# Patient Record
Sex: MALE | URBAN - METROPOLITAN AREA
[De-identification: names, ages, dates, MRNs, and addresses within clinical notes are randomized per-mention and may not be internally consistent; named-entity substitution may affect disease eponyms.]

---

## 2021-01-29 ENCOUNTER — CLINICAL SUPPORT (OUTPATIENT)
Dept: FAMILY MEDICINE CLINIC | Facility: HOSPITAL | Age: 86
End: 2021-01-29

## 2021-01-29 DIAGNOSIS — Z23 ENCOUNTER FOR IMMUNIZATION: Primary | ICD-10-CM

## 2021-01-29 PROCEDURE — 91301 SARS-COV-2 / COVID-19 MRNA VACCINE (MODERNA) 100 MCG: CPT | Performed by: ANESTHESIOLOGY

## 2021-01-29 PROCEDURE — 0011A SARS-COV-2 / COVID-19 MRNA VACCINE (MODERNA) 100 MCG: CPT | Performed by: ANESTHESIOLOGY

## 2021-03-02 ENCOUNTER — IMMUNIZATIONS (OUTPATIENT)
Dept: FAMILY MEDICINE CLINIC | Facility: HOSPITAL | Age: 86
End: 2021-03-02

## 2021-03-02 DIAGNOSIS — Z23 ENCOUNTER FOR IMMUNIZATION: Primary | ICD-10-CM

## 2021-03-02 PROCEDURE — 91301 SARS-COV-2 / COVID-19 MRNA VACCINE (MODERNA) 100 MCG: CPT

## 2021-03-02 PROCEDURE — 0012A SARS-COV-2 / COVID-19 MRNA VACCINE (MODERNA) 100 MCG: CPT

## 2023-01-01 ENCOUNTER — HOME CARE VISIT (OUTPATIENT)
Dept: HOME HEALTH SERVICES | Facility: HOME HEALTHCARE | Age: 88
End: 2023-01-01
Payer: MEDICARE

## 2023-01-01 ENCOUNTER — APPOINTMENT (EMERGENCY)
Dept: RADIOLOGY | Facility: HOSPITAL | Age: 88
End: 2023-01-01
Payer: MEDICARE

## 2023-01-01 ENCOUNTER — HOSPICE ADMISSION (OUTPATIENT)
Dept: HOME HOSPICE | Facility: HOSPICE | Age: 88
End: 2023-01-01
Payer: MEDICARE

## 2023-01-01 ENCOUNTER — HOSPITAL ENCOUNTER (EMERGENCY)
Facility: HOSPITAL | Age: 88
End: 2023-10-17
Attending: EMERGENCY MEDICINE | Admitting: EMERGENCY MEDICINE
Payer: MEDICARE

## 2023-01-01 VITALS
RESPIRATION RATE: 20 BRPM | TEMPERATURE: 97.9 F | SYSTOLIC BLOOD PRESSURE: 197 MMHG | HEART RATE: 29 BPM | DIASTOLIC BLOOD PRESSURE: 79 MMHG | OXYGEN SATURATION: 100 %

## 2023-01-01 DIAGNOSIS — Z51.5 HOSPICE CARE: ICD-10-CM

## 2023-01-01 DIAGNOSIS — R00.1 SYMPTOMATIC BRADYCARDIA: Primary | ICD-10-CM

## 2023-01-01 LAB
ALBUMIN SERPL BCP-MCNC: 3.2 G/DL (ref 3.5–5)
ALP SERPL-CCNC: 66 U/L (ref 34–104)
ALT SERPL W P-5'-P-CCNC: 15 U/L (ref 7–52)
ANION GAP SERPL CALCULATED.3IONS-SCNC: 9 MMOL/L
AST SERPL W P-5'-P-CCNC: 11 U/L (ref 13–39)
ATRIAL RATE: 58 BPM
BASOPHILS # BLD AUTO: 0.05 THOUSANDS/ÂΜL (ref 0–0.1)
BASOPHILS NFR BLD AUTO: 0 % (ref 0–1)
BILIRUB SERPL-MCNC: 0.38 MG/DL (ref 0.2–1)
BUN SERPL-MCNC: 40 MG/DL (ref 5–25)
CALCIUM ALBUM COR SERPL-MCNC: 9.7 MG/DL (ref 8.3–10.1)
CALCIUM SERPL-MCNC: 9.1 MG/DL (ref 8.4–10.2)
CARDIAC TROPONIN I PNL SERPL HS: 22 NG/L
CHLORIDE SERPL-SCNC: 98 MMOL/L (ref 96–108)
CO2 SERPL-SCNC: 24 MMOL/L (ref 21–32)
CREAT SERPL-MCNC: 0.65 MG/DL (ref 0.6–1.3)
EOSINOPHIL # BLD AUTO: 0.02 THOUSAND/ÂΜL (ref 0–0.61)
EOSINOPHIL NFR BLD AUTO: 0 % (ref 0–6)
ERYTHROCYTE [DISTWIDTH] IN BLOOD BY AUTOMATED COUNT: 16.4 % (ref 11.6–15.1)
GFR SERPL CREATININE-BSD FRML MDRD: 83 ML/MIN/1.73SQ M
GLUCOSE SERPL-MCNC: 179 MG/DL (ref 65–140)
HCT VFR BLD AUTO: 30.9 % (ref 36.5–49.3)
HGB BLD-MCNC: 9.5 G/DL (ref 12–17)
IMM GRANULOCYTES # BLD AUTO: 0.15 THOUSAND/UL (ref 0–0.2)
IMM GRANULOCYTES NFR BLD AUTO: 1 % (ref 0–2)
LYMPHOCYTES # BLD AUTO: 2.06 THOUSANDS/ÂΜL (ref 0.6–4.47)
LYMPHOCYTES NFR BLD AUTO: 12 % (ref 14–44)
MCH RBC QN AUTO: 28.4 PG (ref 26.8–34.3)
MCHC RBC AUTO-ENTMCNC: 30.7 G/DL (ref 31.4–37.4)
MCV RBC AUTO: 93 FL (ref 82–98)
MONOCYTES # BLD AUTO: 0.81 THOUSAND/ÂΜL (ref 0.17–1.22)
MONOCYTES NFR BLD AUTO: 5 % (ref 4–12)
NEUTROPHILS # BLD AUTO: 13.89 THOUSANDS/ÂΜL (ref 1.85–7.62)
NEUTS SEG NFR BLD AUTO: 82 % (ref 43–75)
NRBC BLD AUTO-RTO: 0 /100 WBCS
PLATELET # BLD AUTO: 292 THOUSANDS/UL (ref 149–390)
PMV BLD AUTO: 9.9 FL (ref 8.9–12.7)
POTASSIUM SERPL-SCNC: 3.7 MMOL/L (ref 3.5–5.3)
PROT SERPL-MCNC: 6.8 G/DL (ref 6.4–8.4)
QRS AXIS: -89 DEGREES
QRSD INTERVAL: 174 MS
QT INTERVAL: 612 MS
QTC INTERVAL: 418 MS
RBC # BLD AUTO: 3.34 MILLION/UL (ref 3.88–5.62)
SODIUM SERPL-SCNC: 131 MMOL/L (ref 135–147)
T WAVE AXIS: 47 DEGREES
VENTRICULAR RATE: 28 BPM
WBC # BLD AUTO: 16.98 THOUSAND/UL (ref 4.31–10.16)

## 2023-01-01 PROCEDURE — 84484 ASSAY OF TROPONIN QUANT: CPT | Performed by: EMERGENCY MEDICINE

## 2023-01-01 PROCEDURE — 99284 EMERGENCY DEPT VISIT MOD MDM: CPT

## 2023-01-01 PROCEDURE — 36415 COLL VENOUS BLD VENIPUNCTURE: CPT | Performed by: EMERGENCY MEDICINE

## 2023-01-01 PROCEDURE — 80053 COMPREHEN METABOLIC PANEL: CPT | Performed by: EMERGENCY MEDICINE

## 2023-01-01 PROCEDURE — 93010 ELECTROCARDIOGRAM REPORT: CPT | Performed by: INTERNAL MEDICINE

## 2023-01-01 PROCEDURE — 99285 EMERGENCY DEPT VISIT HI MDM: CPT | Performed by: EMERGENCY MEDICINE

## 2023-01-01 PROCEDURE — 93005 ELECTROCARDIOGRAM TRACING: CPT

## 2023-01-01 PROCEDURE — 85025 COMPLETE CBC W/AUTO DIFF WBC: CPT | Performed by: EMERGENCY MEDICINE

## 2023-07-05 ENCOUNTER — APPOINTMENT (EMERGENCY)
Dept: CT IMAGING | Facility: HOSPITAL | Age: 88
DRG: 698 | End: 2023-07-05
Payer: MEDICARE

## 2023-07-05 ENCOUNTER — APPOINTMENT (EMERGENCY)
Dept: RADIOLOGY | Facility: HOSPITAL | Age: 88
DRG: 698 | End: 2023-07-05
Payer: MEDICARE

## 2023-07-05 ENCOUNTER — HOSPITAL ENCOUNTER (INPATIENT)
Facility: HOSPITAL | Age: 88
LOS: 4 days | Discharge: HOME | DRG: 698 | End: 2023-07-09
Attending: EMERGENCY MEDICINE | Admitting: HOSPITALIST
Payer: MEDICARE

## 2023-07-05 DIAGNOSIS — S32.038A OTHER CLOSED FRACTURE OF THIRD LUMBAR VERTEBRA, INITIAL ENCOUNTER (HCC): ICD-10-CM

## 2023-07-05 DIAGNOSIS — N40.0 PROSTATE ENLARGEMENT: ICD-10-CM

## 2023-07-05 DIAGNOSIS — A41.9 SEPTIC SHOCK (HCC): Primary | ICD-10-CM

## 2023-07-05 DIAGNOSIS — R22.2 MASS OF CHEST WALL, LEFT: ICD-10-CM

## 2023-07-05 DIAGNOSIS — G93.40 ACUTE ENCEPHALOPATHY: ICD-10-CM

## 2023-07-05 DIAGNOSIS — R78.81 GRAM-NEGATIVE BACTEREMIA: ICD-10-CM

## 2023-07-05 DIAGNOSIS — N39.0 URINARY TRACT INFECTION: ICD-10-CM

## 2023-07-05 DIAGNOSIS — R65.21 SEPTIC SHOCK (HCC): Primary | ICD-10-CM

## 2023-07-05 DIAGNOSIS — K81.9 CHOLECYSTITIS: ICD-10-CM

## 2023-07-05 PROBLEM — R77.8 ELEVATED TROPONIN: Status: ACTIVE | Noted: 2023-07-05

## 2023-07-05 PROBLEM — R79.89 ELEVATED TROPONIN: Status: ACTIVE | Noted: 2023-07-05

## 2023-07-05 LAB
2HR DELTA HS TROPONIN: 74 NG/L
4HR DELTA HS TROPONIN: 150 NG/L
ALBUMIN SERPL BCP-MCNC: 3 G/DL (ref 3.5–5)
ALP SERPL-CCNC: 141 U/L (ref 34–104)
ALT SERPL W P-5'-P-CCNC: 379 U/L (ref 7–52)
ANION GAP SERPL CALCULATED.3IONS-SCNC: 7 MMOL/L
APTT PPP: 32 SECONDS (ref 23–37)
AST SERPL W P-5'-P-CCNC: 363 U/L (ref 13–39)
BACTERIA UR QL AUTO: ABNORMAL /HPF
BASOPHILS # BLD AUTO: 0.07 THOUSANDS/ÂΜL (ref 0–0.1)
BASOPHILS NFR BLD AUTO: 0 % (ref 0–1)
BILIRUB SERPL-MCNC: 0.61 MG/DL (ref 0.2–1)
BILIRUB UR QL STRIP: NEGATIVE
BUN SERPL-MCNC: 20 MG/DL (ref 5–25)
CALCIUM ALBUM COR SERPL-MCNC: 9.6 MG/DL (ref 8.3–10.1)
CALCIUM SERPL-MCNC: 8.8 MG/DL (ref 8.4–10.2)
CAOX CRY URNS QL MICRO: ABNORMAL /HPF
CARDIAC TROPONIN I PNL SERPL HS: 102 NG/L
CARDIAC TROPONIN I PNL SERPL HS: 178 NG/L
CARDIAC TROPONIN I PNL SERPL HS: 28 NG/L
CHLORIDE SERPL-SCNC: 99 MMOL/L (ref 96–108)
CLARITY UR: ABNORMAL
CO2 SERPL-SCNC: 23 MMOL/L (ref 21–32)
COLOR UR: YELLOW
CREAT SERPL-MCNC: 1.22 MG/DL (ref 0.6–1.3)
EOSINOPHIL # BLD AUTO: 0.01 THOUSAND/ÂΜL (ref 0–0.61)
EOSINOPHIL NFR BLD AUTO: 0 % (ref 0–6)
ERYTHROCYTE [DISTWIDTH] IN BLOOD BY AUTOMATED COUNT: 16.2 % (ref 11.6–15.1)
GFR SERPL CREATININE-BSD FRML MDRD: 50 ML/MIN/1.73SQ M
GLUCOSE SERPL-MCNC: 201 MG/DL (ref 65–140)
GLUCOSE SERPL-MCNC: 226 MG/DL (ref 65–140)
GLUCOSE SERPL-MCNC: 244 MG/DL (ref 65–140)
GLUCOSE SERPL-MCNC: 262 MG/DL (ref 65–140)
GLUCOSE UR STRIP-MCNC: NEGATIVE MG/DL
HCT VFR BLD AUTO: 30.2 % (ref 36.5–49.3)
HGB BLD-MCNC: 9.3 G/DL (ref 12–17)
HGB UR QL STRIP.AUTO: ABNORMAL
IMM GRANULOCYTES # BLD AUTO: 0.14 THOUSAND/UL (ref 0–0.2)
IMM GRANULOCYTES NFR BLD AUTO: 1 % (ref 0–2)
INR PPP: 1.15 (ref 0.84–1.19)
KETONES UR STRIP-MCNC: NEGATIVE MG/DL
LACTATE SERPL-SCNC: 1.5 MMOL/L (ref 0.5–2)
LACTATE SERPL-SCNC: 1.8 MMOL/L (ref 0.5–2)
LEUKOCYTE ESTERASE UR QL STRIP: ABNORMAL
LIPASE SERPL-CCNC: 178 U/L (ref 11–82)
LYMPHOCYTES # BLD AUTO: 1.69 THOUSANDS/ÂΜL (ref 0.6–4.47)
LYMPHOCYTES NFR BLD AUTO: 8 % (ref 14–44)
MCH RBC QN AUTO: 29.1 PG (ref 26.8–34.3)
MCHC RBC AUTO-ENTMCNC: 30.8 G/DL (ref 31.4–37.4)
MCV RBC AUTO: 94 FL (ref 82–98)
MONOCYTES # BLD AUTO: 0.68 THOUSAND/ÂΜL (ref 0.17–1.22)
MONOCYTES NFR BLD AUTO: 3 % (ref 4–12)
MUCOUS THREADS UR QL AUTO: ABNORMAL
NEUTROPHILS # BLD AUTO: 17.95 THOUSANDS/ÂΜL (ref 1.85–7.62)
NEUTS SEG NFR BLD AUTO: 88 % (ref 43–75)
NITRITE UR QL STRIP: NEGATIVE
NON-SQ EPI CELLS URNS QL MICRO: ABNORMAL /HPF
NRBC BLD AUTO-RTO: 0 /100 WBCS
PH UR STRIP.AUTO: 7.5 [PH]
PLATELET # BLD AUTO: 173 THOUSANDS/UL (ref 149–390)
PLATELET # BLD AUTO: 236 THOUSANDS/UL (ref 149–390)
PMV BLD AUTO: 9.3 FL (ref 8.9–12.7)
PMV BLD AUTO: 9.4 FL (ref 8.9–12.7)
POTASSIUM SERPL-SCNC: 4.9 MMOL/L (ref 3.5–5.3)
PROCALCITONIN SERPL-MCNC: 3.21 NG/ML
PROCALCITONIN SERPL-MCNC: 4.96 NG/ML
PROT SERPL-MCNC: 6.2 G/DL (ref 6.4–8.4)
PROT UR STRIP-MCNC: ABNORMAL MG/DL
PROTHROMBIN TIME: 15.5 SECONDS (ref 11.6–14.5)
RBC # BLD AUTO: 3.2 MILLION/UL (ref 3.88–5.62)
RBC #/AREA URNS AUTO: ABNORMAL /HPF
SODIUM SERPL-SCNC: 129 MMOL/L (ref 135–147)
SP GR UR STRIP.AUTO: 1.02 (ref 1–1.03)
UROBILINOGEN UR STRIP-ACNC: <2 MG/DL
WBC # BLD AUTO: 20.54 THOUSAND/UL (ref 4.31–10.16)
WBC #/AREA URNS AUTO: ABNORMAL /HPF

## 2023-07-05 PROCEDURE — 70450 CT HEAD/BRAIN W/O DYE: CPT

## 2023-07-05 PROCEDURE — 83605 ASSAY OF LACTIC ACID: CPT | Performed by: PHYSICIAN ASSISTANT

## 2023-07-05 PROCEDURE — 96368 THER/DIAG CONCURRENT INF: CPT

## 2023-07-05 PROCEDURE — 85610 PROTHROMBIN TIME: CPT | Performed by: PHYSICIAN ASSISTANT

## 2023-07-05 PROCEDURE — 87186 SC STD MICRODIL/AGAR DIL: CPT | Performed by: PHYSICIAN ASSISTANT

## 2023-07-05 PROCEDURE — 82948 REAGENT STRIP/BLOOD GLUCOSE: CPT

## 2023-07-05 PROCEDURE — 80053 COMPREHEN METABOLIC PANEL: CPT | Performed by: PHYSICIAN ASSISTANT

## 2023-07-05 PROCEDURE — 96366 THER/PROPH/DIAG IV INF ADDON: CPT

## 2023-07-05 PROCEDURE — 93005 ELECTROCARDIOGRAM TRACING: CPT

## 2023-07-05 PROCEDURE — 85730 THROMBOPLASTIN TIME PARTIAL: CPT | Performed by: PHYSICIAN ASSISTANT

## 2023-07-05 PROCEDURE — 83605 ASSAY OF LACTIC ACID: CPT | Performed by: HOSPITALIST

## 2023-07-05 PROCEDURE — 99285 EMERGENCY DEPT VISIT HI MDM: CPT

## 2023-07-05 PROCEDURE — 84145 PROCALCITONIN (PCT): CPT | Performed by: HOSPITALIST

## 2023-07-05 PROCEDURE — 81001 URINALYSIS AUTO W/SCOPE: CPT | Performed by: PHYSICIAN ASSISTANT

## 2023-07-05 PROCEDURE — 87154 CUL TYP ID BLD PTHGN 6+ TRGT: CPT | Performed by: PHYSICIAN ASSISTANT

## 2023-07-05 PROCEDURE — 96361 HYDRATE IV INFUSION ADD-ON: CPT

## 2023-07-05 PROCEDURE — 71260 CT THORAX DX C+: CPT

## 2023-07-05 PROCEDURE — 96365 THER/PROPH/DIAG IV INF INIT: CPT

## 2023-07-05 PROCEDURE — 87077 CULTURE AEROBIC IDENTIFY: CPT | Performed by: PHYSICIAN ASSISTANT

## 2023-07-05 PROCEDURE — 84145 PROCALCITONIN (PCT): CPT | Performed by: PHYSICIAN ASSISTANT

## 2023-07-05 PROCEDURE — 36415 COLL VENOUS BLD VENIPUNCTURE: CPT | Performed by: PHYSICIAN ASSISTANT

## 2023-07-05 PROCEDURE — 71045 X-RAY EXAM CHEST 1 VIEW: CPT

## 2023-07-05 PROCEDURE — 87147 CULTURE TYPE IMMUNOLOGIC: CPT | Performed by: PHYSICIAN ASSISTANT

## 2023-07-05 PROCEDURE — G1004 CDSM NDSC: HCPCS

## 2023-07-05 PROCEDURE — 87086 URINE CULTURE/COLONY COUNT: CPT | Performed by: PHYSICIAN ASSISTANT

## 2023-07-05 PROCEDURE — 84484 ASSAY OF TROPONIN QUANT: CPT | Performed by: PHYSICIAN ASSISTANT

## 2023-07-05 PROCEDURE — 74177 CT ABD & PELVIS W/CONTRAST: CPT

## 2023-07-05 PROCEDURE — 85025 COMPLETE CBC W/AUTO DIFF WBC: CPT | Performed by: PHYSICIAN ASSISTANT

## 2023-07-05 PROCEDURE — 85049 AUTOMATED PLATELET COUNT: CPT | Performed by: HOSPITALIST

## 2023-07-05 PROCEDURE — 99285 EMERGENCY DEPT VISIT HI MDM: CPT | Performed by: EMERGENCY MEDICINE

## 2023-07-05 PROCEDURE — 83690 ASSAY OF LIPASE: CPT | Performed by: PHYSICIAN ASSISTANT

## 2023-07-05 PROCEDURE — 87040 BLOOD CULTURE FOR BACTERIA: CPT | Performed by: PHYSICIAN ASSISTANT

## 2023-07-05 PROCEDURE — 99223 1ST HOSP IP/OBS HIGH 75: CPT | Performed by: HOSPITALIST

## 2023-07-05 RX ORDER — ASPIRIN 81 MG/1
81 TABLET, CHEWABLE ORAL DAILY
COMMUNITY

## 2023-07-05 RX ORDER — ONDANSETRON 2 MG/ML
4 INJECTION INTRAMUSCULAR; INTRAVENOUS EVERY 6 HOURS PRN
Status: DISCONTINUED | OUTPATIENT
Start: 2023-07-05 | End: 2023-07-09 | Stop reason: HOSPADM

## 2023-07-05 RX ORDER — ATORVASTATIN CALCIUM 20 MG/1
20 TABLET, FILM COATED ORAL
Status: DISCONTINUED | OUTPATIENT
Start: 2023-07-05 | End: 2023-07-09 | Stop reason: HOSPADM

## 2023-07-05 RX ORDER — METOPROLOL SUCCINATE 50 MG/1
50 TABLET, EXTENDED RELEASE ORAL DAILY
Status: DISCONTINUED | OUTPATIENT
Start: 2023-07-05 | End: 2023-07-09 | Stop reason: HOSPADM

## 2023-07-05 RX ORDER — METOPROLOL SUCCINATE 50 MG/1
50 TABLET, EXTENDED RELEASE ORAL DAILY
COMMUNITY

## 2023-07-05 RX ORDER — CEFEPIME HYDROCHLORIDE 2 G/50ML
2000 INJECTION, SOLUTION INTRAVENOUS EVERY 12 HOURS
Status: DISCONTINUED | OUTPATIENT
Start: 2023-07-05 | End: 2023-07-07

## 2023-07-05 RX ORDER — PHENAZOPYRIDINE HYDROCHLORIDE 95 MG/1
95 TABLET ORAL 3 TIMES DAILY PRN
COMMUNITY

## 2023-07-05 RX ORDER — CEFEPIME HYDROCHLORIDE 2 G/50ML
2000 INJECTION, SOLUTION INTRAVENOUS ONCE
Status: DISCONTINUED | OUTPATIENT
Start: 2023-07-05 | End: 2023-07-05

## 2023-07-05 RX ORDER — FINASTERIDE 5 MG/1
5 TABLET, FILM COATED ORAL DAILY
COMMUNITY

## 2023-07-05 RX ORDER — PHENAZOPYRIDINE HYDROCHLORIDE 100 MG/1
95 TABLET, FILM COATED ORAL 3 TIMES DAILY PRN
Status: DISCONTINUED | OUTPATIENT
Start: 2023-07-05 | End: 2023-07-09 | Stop reason: HOSPADM

## 2023-07-05 RX ORDER — INSULIN LISPRO 100 [IU]/ML
1-5 INJECTION, SOLUTION INTRAVENOUS; SUBCUTANEOUS
Status: DISCONTINUED | OUTPATIENT
Start: 2023-07-05 | End: 2023-07-09 | Stop reason: HOSPADM

## 2023-07-05 RX ORDER — LOSARTAN POTASSIUM 50 MG/1
25 TABLET ORAL DAILY
COMMUNITY

## 2023-07-05 RX ORDER — ATORVASTATIN CALCIUM 20 MG/1
20 TABLET, FILM COATED ORAL DAILY
COMMUNITY

## 2023-07-05 RX ORDER — SODIUM CHLORIDE 9 MG/ML
75 INJECTION, SOLUTION INTRAVENOUS CONTINUOUS
Status: DISCONTINUED | OUTPATIENT
Start: 2023-07-05 | End: 2023-07-09 | Stop reason: HOSPADM

## 2023-07-05 RX ORDER — METRONIDAZOLE 500 MG/100ML
500 INJECTION, SOLUTION INTRAVENOUS EVERY 8 HOURS
Status: DISCONTINUED | OUTPATIENT
Start: 2023-07-05 | End: 2023-07-07

## 2023-07-05 RX ORDER — ENOXAPARIN SODIUM 100 MG/ML
40 INJECTION SUBCUTANEOUS DAILY
Status: DISCONTINUED | OUTPATIENT
Start: 2023-07-05 | End: 2023-07-09 | Stop reason: HOSPADM

## 2023-07-05 RX ORDER — ACETAMINOPHEN 325 MG/1
650 TABLET ORAL EVERY 6 HOURS PRN
Status: DISCONTINUED | OUTPATIENT
Start: 2023-07-05 | End: 2023-07-09 | Stop reason: HOSPADM

## 2023-07-05 RX ORDER — CEFTRIAXONE 2 G/50ML
2000 INJECTION, SOLUTION INTRAVENOUS ONCE
Status: COMPLETED | OUTPATIENT
Start: 2023-07-05 | End: 2023-07-05

## 2023-07-05 RX ADMIN — METRONIDAZOLE 500 MG: 500 INJECTION, SOLUTION INTRAVENOUS at 19:42

## 2023-07-05 RX ADMIN — SODIUM CHLORIDE 490 ML: 0.9 INJECTION, SOLUTION INTRAVENOUS at 11:32

## 2023-07-05 RX ADMIN — SODIUM CHLORIDE 500 ML: 0.9 INJECTION, SOLUTION INTRAVENOUS at 10:52

## 2023-07-05 RX ADMIN — IOHEXOL 80 ML: 350 INJECTION, SOLUTION INTRAVENOUS at 11:06

## 2023-07-05 RX ADMIN — SODIUM CHLORIDE 125 ML/HR: 0.9 INJECTION, SOLUTION INTRAVENOUS at 15:15

## 2023-07-05 RX ADMIN — METRONIDAZOLE 500 MG: 500 INJECTION, SOLUTION INTRAVENOUS at 10:53

## 2023-07-05 RX ADMIN — ATORVASTATIN CALCIUM 20 MG: 20 TABLET, FILM COATED ORAL at 16:55

## 2023-07-05 RX ADMIN — INSULIN LISPRO 2 UNITS: 100 INJECTION, SOLUTION INTRAVENOUS; SUBCUTANEOUS at 21:35

## 2023-07-05 RX ADMIN — ENOXAPARIN SODIUM 40 MG: 100 INJECTION SUBCUTANEOUS at 16:55

## 2023-07-05 RX ADMIN — CEFTRIAXONE 2000 MG: 2 INJECTION, SOLUTION INTRAVENOUS at 11:33

## 2023-07-05 RX ADMIN — CEFEPIME HYDROCHLORIDE 2000 MG: 2 INJECTION, SOLUTION INTRAVENOUS at 18:08

## 2023-07-05 RX ADMIN — SODIUM CHLORIDE 1000 ML: 0.9 INJECTION, SOLUTION INTRAVENOUS at 08:43

## 2023-07-05 RX ADMIN — METOPROLOL SUCCINATE 50 MG: 50 TABLET, EXTENDED RELEASE ORAL at 16:55

## 2023-07-05 NOTE — ASSESSMENT & PLAN NOTE
He has no chest pain. This is not an acute coronary syndrome.     I would not further pursue this  Continue metoprolol

## 2023-07-05 NOTE — PLAN OF CARE
Problem: MOBILITY - ADULT  Goal: Maintain or return to baseline ADL function  Description: INTERVENTIONS:  -  Assess patient's ability to carry out ADLs; assess patient's baseline for ADL function and identify physical deficits which impact ability to perform ADLs (bathing, care of mouth/teeth, toileting, grooming, dressing, etc.)  - Assess/evaluate cause of self-care deficits   - Assess range of motion  - Assess patient's mobility; develop plan if impaired  - Assess patient's need for assistive devices and provide as appropriate  - Encourage maximum independence but intervene and supervise when necessary  - Involve family in performance of ADLs  - Assess for home care needs following discharge   - Consider OT consult to assist with ADL evaluation and planning for discharge  - Provide patient education as appropriate  7/5/2023 1739 by Armando Chan RN  Outcome: Progressing  7/5/2023 1739 by Armando Chan RN  Outcome: Progressing  Goal: Maintains/Returns to pre admission functional level  Description: INTERVENTIONS:  - Perform BMAT or MOVE assessment daily.   - Set and communicate daily mobility goal to care team and patient/family/caregiver. - Collaborate with rehabilitation services on mobility goals if consulted  - Perform Range of Motion 4 times a day. - Reposition patient every 2 hours.   - Dangle patient 3 times a day  - Stand patient 3 times a day  - Ambulate patient 3 times a day  - Out of bed to chair 3 times a day   - Out of bed for meals 3 times a day  - Out of bed for toileting  - Record patient progress and toleration of activity level   7/5/2023 1739 by Armando Chan RN  Outcome: Progressing  7/5/2023 1739 by Armando Chan RN  Outcome: Progressing     Problem: Prexisting or High Potential for Compromised Skin Integrity  Goal: Skin integrity is maintained or improved  Description: INTERVENTIONS:  - Identify patients at risk for skin breakdown  - Assess and monitor skin integrity  - Assess and monitor nutrition and hydration status  - Monitor labs   - Assess for incontinence   - Turn and reposition patient  - Assist with mobility/ambulation  - Relieve pressure over bony prominences  - Avoid friction and shearing  - Provide appropriate hygiene as needed including keeping skin clean and dry  - Evaluate need for skin moisturizer/barrier cream  - Collaborate with interdisciplinary team   - Patient/family teaching  - Consider wound care consult   7/5/2023 1739 by Lucrecia Wells RN  Outcome: Progressing  7/5/2023 1739 by Lucrecia Wells RN  Outcome: Progressing     Problem: PAIN - ADULT  Goal: Verbalizes/displays adequate comfort level or baseline comfort level  Description: Interventions:  - Encourage patient to monitor pain and request assistance  - Assess pain using appropriate pain scale  - Administer analgesics based on type and severity of pain and evaluate response  - Implement non-pharmacological measures as appropriate and evaluate response  - Consider cultural and social influences on pain and pain management  - Notify physician/advanced practitioner if interventions unsuccessful or patient reports new pain  7/5/2023 1739 by Lucrecia Wells RN  Outcome: Progressing  7/5/2023 1739 by Lucrecia Wells RN  Outcome: Progressing     Problem: INFECTION - ADULT  Goal: Absence or prevention of progression during hospitalization  Description: INTERVENTIONS:  - Assess and monitor for signs and symptoms of infection  - Monitor lab/diagnostic results  - Monitor all insertion sites, i.e. indwelling lines, tubes, and drains  - Monitor endotracheal if appropriate and nasal secretions for changes in amount and color  - [place] appropriate cooling/warming therapies per order  - Administer medications as ordered  - Instruct and encourage patient and family to use good hand hygiene technique  - Identify and instruct in appropriate isolation precautions for identified infection/condition  7/5/2023 1739 by Link Greco RN  Outcome: Progressing  7/5/2023 1739 by Link Greco RN  Outcome: Progressing     Problem: SAFETY ADULT  Goal: Patient will remain free of falls  Description: INTERVENTIONS:  - Educate patient/family on patient safety including physical limitations  - Instruct patient to call for assistance with activity   - Consult OT/PT to assist with strengthening/mobility   - Keep Call bell within reach  - Keep bed low and locked with side rails adjusted as appropriate  - Keep care items and personal belongings within reach  - Initiate and maintain comfort rounds  - Make Fall Risk Sign visible to staff  - Offer Toileting every 2 Hours, in advance of need  - Initiate/Maintain bed alarm  - Obtain necessary fall risk management equipment  - Apply yellow socks and bracelet for high fall risk patients  - Consider moving patient to room near nurses station  7/5/2023 1739 by Link Greco RN  Outcome: Progressing  7/5/2023 1739 by Link Greco RN  Outcome: Progressing     Problem: CARDIOVASCULAR - ADULT  Goal: Maintains optimal cardiac output and hemodynamic stability  Description: INTERVENTIONS:  - Monitor I/O, vital signs and rhythm  - Monitor for S/S and trends of decreased cardiac output  - Administer and titrate ordered vasoactive medications to optimize hemodynamic stability  - Assess quality of pulses, skin color and temperature  - Assess for signs of decreased coronary artery perfusion  - Instruct patient to report change in severity of symptoms  7/5/2023 1739 by Link Greco RN  Outcome: Progressing  7/5/2023 1739 by Link Greco RN  Outcome: Progressing     Problem: RESPIRATORY - ADULT  Goal: Achieves optimal ventilation and oxygenation  Description: INTERVENTIONS:  - Assess for changes in respiratory status  - Assess for changes in mentation and behavior  - Position to facilitate oxygenation and minimize respiratory effort  - Oxygen administered by appropriate delivery if ordered  - Initiate smoking cessation education as indicated  - Encourage broncho-pulmonary hygiene including cough, deep breathe, Incentive Spirometry  - Assess the need for suctioning and aspirate as needed  - Assess and instruct to report SOB or any respiratory difficulty  - Respiratory Therapy support as indicated  7/5/2023 1739 by Beatrice Marquez RN  Outcome: Progressing  7/5/2023 1739 by Beatrice Marquez RN  Outcome: Progressing     Problem: GENITOURINARY - ADULT  Goal: Urinary catheter remains patent  Description: INTERVENTIONS:  - Assess patency of urinary catheter  - If patient has a chronic conde, consider changing catheter if non-functioning  - Follow guidelines for intermittent irrigation of non-functioning urinary catheter  7/5/2023 1739 by Beatrice Marquez RN  Outcome: Progressing  7/5/2023 1739 by Beatrice Marquez RN  Outcome: Progressing

## 2023-07-05 NOTE — ASSESSMENT & PLAN NOTE
Did have a cough last night which is not unusual for him. Nonproductive  He just finished a course of Bactrim less than a week ago for a UTI with a chronic indwelling [place] catheter. Now this morning he was noted by his daughter to be very lethargic, falls asleep while we are talking. He does not have any specific complaints though. Not short of breath, not coughing, no abdominal pain, does have a little bit of loose stool after the Bactrim but he would not say the diarrhea. 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   Otherwise I do not really see a source for infection other than these 2    We will send blood cultures, urine cultures  Follow sepsis pathway  Start him empirically on cefepime    Ask infectious disease to see the patient  Asked wound care to see the patient with this chest wall mass (he has refused work-up and treatment of this mass for years)

## 2023-07-05 NOTE — ED PROVIDER NOTES
History  Chief Complaint   Patient presents with   • Altered Mental Status     To ED with AMS per family. Patient unable to stand, poor response to family, seating this am per family. Being treated for UTI. Family states that he was alert and oriented last evening. 72-year-old male from home with altered mental status this morning. No medical records are available to us but patient's wife states he has a remote history of coronary bypass, enlarged prostate, indwelling Acosta catheter with recent Bactrim treatment for UTI, left chest wall mass that has been enlarging, hyperglycemia and hypertension. He was found diaphoretic, weak, confused and lethargic this morning. Brought by car. Patient denies to me pain, dyspnea, vomiting, diarrhea and blood in toilet. Wife states that he has been well lately and ate 3 meals yesterday. He watched television till 9:30 PM and walked to bed last night. She noted blood sugar was 232 this morning versus normally under 150 without diabetes medications. He did not receive his morning medications or any food or drink. Prior to Admission Medications   Prescriptions Last Dose Informant Patient Reported?  Taking?   aspirin 81 mg chewable tablet 7/4/2023  Yes Yes   Sig: Chew 81 mg daily   atorvastatin (LIPITOR) 20 mg tablet 7/4/2023  Yes Yes   Sig: Take 20 mg by mouth daily   finasteride (PROSCAR) 5 mg tablet 7/4/2023  Yes Yes   Sig: Take 5 mg by mouth daily   losartan (COZAAR) 50 mg tablet 7/4/2023  Yes Yes   Sig: Take 50 mg by mouth daily   metoprolol succinate (TOPROL-XL) 50 mg 24 hr tablet 7/4/2023  Yes Yes   Sig: Take 50 mg by mouth daily   phenazopyridine (PYRIDIUM) 95 MG tablet  Spouse/Significant Other Yes Yes   Sig: Take 95 mg by mouth 3 (three) times a day as needed for bladder spasms      Facility-Administered Medications: None       Past Medical History:   Diagnosis Date   • Coronary artery disease    • Diabetes mellitus ([address])    • Renal disorder        Past Surgical History:   Procedure Laterality Date   • CORONARY ANGIOPLASTY WITH STENT PLACEMENT         History reviewed. No pertinent family history. I have reviewed and agree with the history as documented. E-Cigarette/Vaping   • E-Cigarette Use Never User      E-Cigarette/Vaping Substances   • Nicotine No    • Flavoring No      Social History     Tobacco Use   • Smoking status: Former     Types: Cigarettes   • Smokeless tobacco: Never   Vaping Use   • Vaping Use: Never used   Substance Use Topics   • Alcohol use: Never   • Drug use: Never       Review of Systems   Constitutional: Positive for activity change, diaphoresis and fatigue. Negative for fever. Respiratory: Positive for cough. Negative for shortness of breath. Cardiovascular: Negative for chest pain, palpitations and leg swelling. Gastrointestinal: Negative for abdominal pain, blood in stool, diarrhea and vomiting. Genitourinary: Positive for difficulty urinating ( Indwelling Acosta catheter). Skin: Positive for wound ( Left chest wall, chronic). Negative for rash. Physical Exam  Physical Exam  Vitals and nursing note reviewed. Constitutional:       General: He is not in acute distress. Appearance: He is ill-appearing and diaphoretic. HENT:      Head: Normocephalic and atraumatic. Eyes:      General: No scleral icterus. Pupils: Pupils are equal.   Cardiovascular:      Rate and Rhythm: Normal rate and regular rhythm. Heart sounds: Normal heart sounds. Pulmonary:      Effort: Pulmonary effort is normal.      Breath sounds: Rales ( Left base) present. Comments: Large erosive left anterior chest wall lesion. No active bleeding or signs of infection. Abdominal:      General: Bowel sounds are normal. There is no distension. Palpations: Abdomen is soft. Tenderness: There is no abdominal tenderness. Musculoskeletal:         General: No swelling or tenderness. Normal range of motion.       Cervical back: Neck supple. No rigidity. Skin:     Capillary Refill: Capillary refill takes 2 to 3 seconds. Coloration: Skin is pale. Neurological:      Mental Status: He is alert. He is disoriented. GCS: GCS eye subscore is 4. GCS verbal subscore is 4. GCS motor subscore is 6. Cranial Nerves: No cranial nerve deficit or facial asymmetry. Sensory: No sensory deficit. Motor: No weakness.          Vital Signs  ED Triage Vitals   Temperature Pulse Respirations Blood Pressure SpO2   07/05/23 0911 07/05/23 0828 07/05/23 0828 07/05/23 0828 07/05/23 0828   97.8 °F (36.6 °C) 93 20 (!) 77/47 90 %      Temp Source Heart Rate Source Patient Position - Orthostatic VS BP Location FiO2 (%)   07/05/23 0911 07/05/23 0828 07/05/23 0828 07/05/23 0828 --   Oral Monitor Lying Left arm       Pain Score       07/05/23 0828       No Pain           Vitals:    07/05/23 1600 07/05/23 1655 07/05/23 1700 07/05/23 1800   BP: 124/56 125/56 150/63 132/60   Pulse: 67 74 74 75   Patient Position - Orthostatic VS:             Visual Acuity  Visual Acuity    Flowsheet Row Most Recent Value   L Pupil Size (mm) 3   R Pupil Size (mm) 3   L Pupil Shape Round   R Pupil Shape Round          ED Medications  Medications   metroNIDAZOLE (FLAGYL) IVPB (premix) 500 mg 100 mL (500 mg Intravenous New Bag 7/5/23 1942)   atorvastatin (LIPITOR) tablet 20 mg (20 mg Oral Given 7/5/23 1655)   metoprolol succinate (TOPROL-XL) 24 hr tablet 50 mg (50 mg Oral Given 7/5/23 1655)   phenazopyridine (PYRIDIUM) tablet 100 mg (has no administration in time range)   sodium chloride 0.9 % infusion (75 mL/hr Intravenous Rate/Dose Change 7/1934)   enoxaparin (LOVENOX) subcutaneous injection 40 mg (40 mg Subcutaneous Given 7/5/23 1655)   ondansetron (ZOFRAN) injection 4 mg (has no administration in time range)   acetaminophen (TYLENOL) tablet 650 mg (has no administration in time range)   cefepime (MAXIPIME) IVPB (premix in dextrose) 2,000 mg 50 mL (2,000 mg Intravenous New Bag 7/5/23 1808)   insulin lispro (HumaLOG) 100 units/mL subcutaneous injection 1-5 Units ( Subcutaneous Not Given 7/5/23 1807)   insulin lispro (HumaLOG) 100 units/mL subcutaneous injection 1-5 Units (has no administration in time range)   sodium chloride 0.9 % bolus 1,000 mL (0 mL Intravenous Stopped 7/5/23 0930)   sodium chloride 0.9 % bolus 500 mL (0 mL Intravenous Stopped 7/5/23 1145)   cefTRIAXone (ROCEPHIN) IVPB (premix in dextrose) 2,000 mg 50 mL (0 mg Intravenous Stopped 7/5/23 1200)   iohexol (OMNIPAQUE) 350 MG/ML injection (MULTI-DOSE) 100 mL (80 mL Intravenous Given 7/5/23 1106)   sodium chloride 0.9 % bolus 490 mL (0 mL Intravenous Stopped 7/5/23 1215)       Diagnostic Studies  Results Reviewed     Procedure Component Value Units Date/Time    Clostridium difficile toxin by PCR with EIA [[id number]] Updated: 07/05/23 1556    Lab Status: No result Specimen: Stool from Per Rectum     Blood culture #1 [[id number]] Collected: 07/05/23 0837    Lab Status: Preliminary result Specimen: Blood from Arm, Right Updated: 07/05/23 1501     Blood Culture Received in Microbiology Lab. Culture in Progress. Blood culture #2 [[id number]] Collected: 07/05/23 0837    Lab Status: Preliminary result Specimen: Blood from Arm, Right Updated: 07/05/23 1501     Blood Culture Received in Microbiology Lab. Culture in Progress.     HS Troponin I 4hr [[id number]]  (Abnormal) Collected: 07/05/23 1253    Lab Status: Final result Specimen: Blood from Arm, Right Updated: 07/05/23 1332     hs TnI 4hr 178 ng/L      Delta 4hr hsTnI 150 ng/L     Urine Microscopic [[id number]]  (Abnormal) Collected: 07/05/23 1049    Lab Status: Final result Specimen: Urine, Indwelling Acosta Catheter Updated: 07/05/23 1147     RBC, UA None Seen /hpf      WBC, UA Innumerable /hpf      Epithelial Cells None Seen /hpf      Bacteria, UA Innumerable /hpf      MUCUS THREADS None Seen     Ca Oxalate Bhumi, UA Occasional /hpf     Urine culture [[id number]] Collected: 07/05/23 1049    Lab Status:  In process Specimen: Urine, Indwelling Acosta Catheter Updated: 07/05/23 1145    HS Troponin I 2hr [[id number]]  (Abnormal) Collected: 07/05/23 1048    Lab Status: Final result Specimen: Blood from Arm, Right Updated: 07/05/23 1126     hs TnI 2hr 102 ng/L      Delta 2hr hsTnI 74 ng/L     UA w Reflex to Microscopic w Reflex to Culture [[id number]]  (Abnormal) Collected: 07/05/23 1049    Lab Status: Final result Specimen: Urine, Indwelling Acosta Catheter Updated: 07/05/23 1118     Color, UA Yellow     Clarity, UA Cloudy     Specific Gravity, UA 1.020     pH, UA 7.5     Leukocytes, UA Large     Nitrite, UA Negative     Protein, UA 30 (1+) mg/dl      Glucose, UA Negative mg/dl      Ketones, UA Negative mg/dl      Urobilinogen, UA <2.0 mg/dl      Bilirubin, UA Negative     Occult Blood, UA Moderate    Protime-INR [[id number]]  (Abnormal) Collected: 07/05/23 0837    Lab Status: Final result Specimen: Blood from Arm, Right Updated: 07/05/23 0937     Protime 15.5 seconds      INR 1.15    APTT [[id number]]  (Normal) Collected: 07/05/23 0837    Lab Status: Final result Specimen: Blood from Arm, Right Updated: 07/05/23 0937     PTT 32 seconds     Procalcitonin [[id number]]  (Abnormal) Collected: 07/05/23 0837    Lab Status: Final result Specimen: Blood from Arm, Right Updated: 07/05/23 0923     Procalcitonin 4.96 ng/ml     HS Troponin 0hr (reflex protocol) [[id number]]  (Normal) Collected: 07/05/23 0837    Lab Status: Final result Specimen: Blood from Arm, Right Updated: 07/05/23 0922     hs TnI 0hr 28 ng/L     Comprehensive metabolic panel [[id number]]  (Abnormal) Collected: 07/05/23 0837    Lab Status: Final result Specimen: Blood from Arm, Right Updated: 07/05/23 0916     Sodium 129 mmol/L      Potassium 4.9 mmol/L      Chloride 99 mmol/L      CO2 23 mmol/L      ANION GAP 7 mmol/L      BUN 20 mg/dL      Creatinine 1.22 mg/dL      Glucose 201 mg/dL      Calcium 8.8 mg/dL      Corrected Calcium 9.6 mg/dL  U/L       U/L      Alkaline Phosphatase 141 U/L      Total Protein 6.2 g/dL      Albumin 3.0 g/dL      Total Bilirubin 0.61 mg/dL      eGFR 50 ml/min/1.73sq m     Narrative:      [facility] guidelines for Chronic Kidney Disease (CKD):   •  Stage 1 with normal or high GFR (GFR > 90 mL/min/1.73 square meters)  •  Stage 2 Mild CKD (GFR = 60-89 mL/min/1.73 square meters)  •  Stage 3A Moderate CKD (GFR = 45-59 mL/min/1.73 square meters)  •  Stage 3B Moderate CKD (GFR = 30-44 mL/min/1.73 square meters)  •  Stage 4 Severe CKD (GFR = 15-29 mL/min/1.73 square meters)  •  Stage 5 End Stage CKD (GFR <15 mL/min/1.73 square meters)  Note: GFR calculation is accurate only with a steady state creatinine    Lipase [[id number]]  (Abnormal) Collected: 07/05/23 0837    Lab Status: Final result Specimen: Blood from Arm, Right Updated: 07/05/23 0916     Lipase 178 u/L     Lactic acid [[id number]]  (Normal) Collected: 07/05/23 0837    Lab Status: Final result Specimen: Blood from Arm, Right Updated: 07/05/23 0915     LACTIC ACID 1.8 mmol/L     Narrative:      Result may be elevated if tourniquet was used during collection.     CBC and differential [[id number]]  (Abnormal) Collected: 07/05/23 0837    Lab Status: Final result Specimen: Blood from Arm, Right Updated: 07/05/23 0856     WBC 20.54 Thousand/uL      RBC 3.20 Million/uL      Hemoglobin 9.3 g/dL      Hematocrit 30.2 %      MCV 94 fL      MCH 29.1 pg      MCHC 30.8 g/dL      RDW 16.2 %      MPV 9.4 fL      Platelets 881 Thousands/uL      nRBC 0 /100 WBCs      Neutrophils Relative 88 %      Immat GRANS % 1 %      Lymphocytes Relative 8 %      Monocytes Relative 3 %      Eosinophils Relative 0 %      Basophils Relative 0 %      Neutrophils Absolute 17.95 Thousands/µL      Immature Grans Absolute 0.14 Thousand/uL      Lymphocytes Absolute 1.69 Thousands/µL      Monocytes Absolute 0.68 Thousand/µL      Eosinophils Absolute 0.01 Thousand/µL Basophils Absolute 0.07 Thousands/µL     Fingerstick Glucose (POCT) [[id number]]  (Abnormal) Collected: 07/05/23 0829    Lab Status: Final result Updated: 07/05/23 0830     POC Glucose 226 mg/dl                  CT chest abdomen pelvis w contrast   Final Result by Tati Suresh MD (07/05 1240)      Left basilar density seen which may be due to atelectasis,/consolidation   Trace left effusion      Markedly thickened urinary bladder wall, may be sequela of chronic urinary bladder obstruction from markedly enlarged prostate. Clinical correlation and urology evaluation suggested for definite characterization and need for assessment with cystoscopy      No intra-abdominal fluid collection seen   Cholelithiasis seen   Trace pericholecystic fluid, nonspecific, correlate clinically if concern for acute cholecystitis      Fracture of the L3 vertebra with about 50% loss of the vertebral height with sclerosis, likely due to osteoporotic compression      Ulcerated irregular left chest wall mass involving the skin and the superficial soft tissue      The study was marked in EPIC for immediate notification. Workstation performed: [id number]         XR chest portable   ED Interpretation by David Ratliff DO (07/05 7337)   No acute cardiopulmonary disease      Final Result by Emanuel Waddell MD (07/05 1035)      No acute cardiopulmonary disease.                   Workstation performed: [id number]         CT head without contrast   Final Result by Tati Suresh MD (07/05 4530)      No acute intracranial hemorrhage seen   No mass effect or midline shift seen                  Workstation performed: Rheta Nadia right upper quadrant    (Results Pending)              Procedures  ECG 12 Lead Documentation Only    Date/Time: 7/5/2023 8:44 AM    Performed by: David Ratliff DO  Authorized by: David Ratliff DO    ECG reviewed by me, the ED Provider: yes    Patient location:  ED  Previous ECG: Previous ECG:  Unavailable    Comparison to cardiac monitor: Yes    Rate:     ECG rate assessment: normal    Rhythm:     Rhythm: other rhythm    QRS:     QRS axis:  Left    QRS intervals: Wide  Conduction:     Conduction: abnormal      Abnormal conduction: complete RBBB and LAFB    ST segments:     ST segments:  Normal  T waves:     T waves: normal               ED Course                               SBIRT 20yo+    Flowsheet Row Most Recent Value   Initial Alcohol Screen: US AUDIT-C     1. How often do you have a drink containing alcohol? 0 Filed at: 07/05/2023 0833   2. How many drinks containing alcohol do you have on a typical day you are drinking? 0 Filed at: 07/05/2023 0833   3a. Male UNDER 65: How often do you have five or more drinks on one occasion? 0 Filed at: 07/05/2023 0833   3b. FEMALE Any Age, or MALE 65+: How often do you have 4 or more drinks on one occassion? 0 Filed at: 07/05/2023 3782   Audit-C Score 0 Filed at: 07/05/2023 3230   GOMEZ: How many times in the past year have you. .. Used an illegal drug or used a prescription medication for non-medical reasons? Never Filed at: 07/05/2023 5864                    Medical Decision Making  80year-old male found this morning with hypotension, diaphoresis and lethargy. Has hyperglycemia and SIRS criteria with hypotension. We will do sepsis work-up, IV crystalloid resuscitation, and check labs and imaging for sepsis, dehydration, electrode abnormality, CHICO, bacteremia, pneumonia, intra abdominal infection, intracranial bleed, UTI, etc.  No sign for infection of the left chest wall mass. Does have indwelling Acosta catheter no recent fever but recent completed course of Bactrim for apparent UTI. Has no medical records at this hospital.    Patient was very fluid responsive. Received Rocephin and Flagyl.   CT shows cholelithiasis with mild signs of gallbladder irritation, irregular urinary bladder wall, UTI, atelectasis versus consolidation of left lower lobe with slight effusion. Discussed with critical care and with Davon. Patient will be admitted to the [facility] service on telemetry. Acute encephalopathy: acute illness or injury  Mass of chest wall, left: chronic illness or injury with exacerbation, progression, or side effects of treatment  Septic shock ([address]): acute illness or injury  Amount and/or Complexity of Data Reviewed  Independent Historian: deshaun  Labs: ordered. Radiology: ordered and independent interpretation performed. Decision-making details documented in ED Course. Risk  Prescription drug management. Decision regarding hospitalization. Disposition  Final diagnoses:   Septic shock ([address])   Mass of chest wall, left   Acute encephalopathy     Time reflects when diagnosis was documented in both MDM as applicable and the Disposition within this note     Time User Action Codes Description Comment    7/5/2023 11:10 AM Rayna Vegas [A41.9,  R65.21] Septic shock ([address])     7/5/2023  1:21 PM Rayna Vegas [R22.2] Mass of chest wall, left     7/5/2023  1:22 PM Rayna Vegas [G93.40] Acute encephalopathy       ED Disposition     ED Disposition   Admit    Condition   Stable    Date/Time   Wed Jul 5, 2023  1:21 PM    Comment   Case was discussed with Dr. Ray Loza and the patient's admission status was agreed to be Admission Status: inpatient status to the service of Dr. Ray Loza .            Follow-up Information    None         Current Discharge Medication List      CONTINUE these medications which have NOT CHANGED    Details   aspirin 81 mg chewable tablet Chew 81 mg daily      atorvastatin (LIPITOR) 20 mg tablet Take 20 mg by mouth daily      finasteride (PROSCAR) 5 mg tablet Take 5 mg by mouth daily      losartan (COZAAR) 50 mg tablet Take 50 mg by mouth daily      metoprolol succinate (TOPROL-XL) 50 mg 24 hr tablet Take 50 mg by mouth daily      phenazopyridine (PYRIDIUM) 95 MG tablet Take 95 mg by mouth 3 (three) times a day as needed for bladder spasms             No discharge procedures on file.     PDMP Review     None          ED Provider  Electronically Signed by           Bear Becerra DO  07/05/23 5726

## 2023-07-05 NOTE — PROGRESS NOTES
[facility]  Progress Note  Name: Cristy Ennis  MRN: [id number]  Unit/Bed#: ED 05 I Date of Admission: 7/5/2023   Date of Service: 7/5/2023 I Hospital Day: 0    Assessment/Plan   * Sepsis [facility])  Assessment & Plan  Did have a cough last night which is not unusual for him. Nonproductive  He just finished a course of Bactrim less than a week ago for a UTI with a chronic indwelling Acosta catheter. Now this morning he was noted by his daughter to be very lethargic, falls asleep while we are talking. He does not have any specific complaints though. Not short of breath, not coughing, no abdominal pain, does have a little bit of loose stool after the Bactrim but he would not say the diarrhea. 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 Otherwise I do not really see a source for infection other than these 2    We will send blood cultures, urine cultures  Follow sepsis pathway  Start him empirically on cefepime    Ask infectious disease to see the patient  Asked wound care to see the patient with this chest wall mass (he has refused work-up and treatment of this mass for years)      Elevated troponin  Assessment & Plan  He has no chest pain. This is not an acute coronary syndrome. I would not further pursue this  Continue metoprolol           Chief Complaint:   Lethargy      History of Present Illness:    Whitney Torres is a 80 y.o. male who presents with lethargy. He is brought in by his daughter. He falls asleep during my history taking. Patient was recently treated with Bactrim for UTI as he has a chronic Acosta catheter. He finished that less than a week ago per the daughter. Otherwise has been in his normal state of health. Last night prior to going to bed he did about an hour of coughing.   But daughter states this is usually when he does at night. Is nothing new. Then this morning he was extremely lethargic. Really did not want to get out of bed. Was slumped over. So she brought him to the ER. Cosme Chatman He was noted to be hypotensive. This did respond nicely to IV fluid resuscitation. Patient is more awake now. But he does quickly fall asleep while we are talking. He does not have any specific complaints. No headache. No neck stiffness. No vision changes. No cough today. No shortness of breath. Does not feel like he has a fever. Does not feel like he has chills. Abdominal no abdominal pain. He has a chronic Acosta catheter so we cannot tell about dysuria. But not complaining of any bladder pain. He states his stools are a little loose. But he is only having 1-2 bowel movements per day. He relates this to taking Bactrim. He does have a mass in his left chest wall. He has had this for years. He did not want to pursue diagnosis and treatment so it has not been biopsied. But this is growing. Daughter has to change the dressing on an every day. It does have purulent drainage and is rather pungent. Review of Systems:    Review of Systems   Constitutional: Negative for chills and fever. HENT: Negative for ear pain and sore throat. Eyes: Negative for pain and visual disturbance. Respiratory: Negative for cough and shortness of breath. Cardiovascular: Negative for chest pain and palpitations. Gastrointestinal: Negative for abdominal pain and vomiting. Genitourinary: Negative for dysuria and hematuria. Musculoskeletal: Negative for arthralgias and back pain. Skin: Negative for color change and rash. Neurological: Negative for seizures and syncope. All other systems reviewed and are negative.         Past Medical and Surgical History:     Past Medical History:   Diagnosis Date   • Coronary artery disease    • Diabetes mellitus ([address])    • Renal disorder        Past Surgical History:   Procedure Laterality Date   • CORONARY ANGIOPLASTY WITH STENT PLACEMENT           Home Medications:    Prior to Admission medications    Medication Sig Start Date End Date Taking? Authorizing Provider   atorvastatin (LIPITOR) 20 mg tablet Take 20 mg by mouth daily   Yes Historical Provider, MD   losartan (COZAAR) 50 mg tablet Take 50 mg by mouth daily   Yes Historical Provider, MD   metoprolol succinate (TOPROL-XL) 50 mg 24 hr tablet Take 50 mg by mouth daily   Yes Historical Provider, MD   phenazopyridine (PYRIDIUM) 95 MG tablet Take 95 mg by mouth 3 (three) times a day as needed for bladder spasms   Yes Historical Provider, MD     I have reviewed home medications with patient personally. Allergies: Allergies   Allergen Reactions   • Levaquin [Levofloxacin] Confusion         Social History:    Substance Use History:   Social History     Substance and Sexual Activity   Alcohol Use Never     Social History     Tobacco Use   Smoking Status Former   • Types: Cigarettes   Smokeless Tobacco Never     Social History     Substance and Sexual Activity   Drug Use Never         Family History:    non-contributory      Physical Exam:     Vitals:   Blood Pressure: 126/60 (07/05/23 1240)  Pulse: 67 (07/05/23 1240)  Temperature: 97.8 °F (36.6 °C) (07/05/23 0911)  Temp Source: Oral (07/05/23 0911)  Respirations: 18 (07/05/23 1240)  Height: 5' 8" (172.7 cm) (07/05/23 0828)  Weight - Scale: 62 kg (136 lb 11 oz) (07/05/23 1051)  SpO2: 97 % (07/05/23 1240)    Physical Exam  Vitals and nursing note reviewed. HENT:      Head: Normocephalic and atraumatic. Eyes:      Pupils: Pupils are equal, round, and reactive to light. Cardiovascular:      Rate and Rhythm: Normal rate and regular rhythm. Heart sounds: No murmur heard. No friction rub. No gallop. Pulmonary:      Effort: Pulmonary effort is normal.      Breath sounds: Normal breath sounds. No wheezing or rales.    Abdominal:      General: Bowel sounds are normal.      Palpations: Abdomen is soft. Tenderness: There is no abdominal tenderness. Musculoskeletal:      Right lower leg: No edema. Left lower leg: No edema. Comments: Left chest wall. Has a cancerous appearing mass. It is raised, about the size of a tennis ball. Red. Does have purulent drainage in the middle. Also extremely pungent odor. .    Additional Data:     Lab Results: I have personally reviewed pertinent reports. Results from last 7 days   Lab Units 07/05/23  0837   WBC Thousand/uL 20.54*   HEMOGLOBIN g/dL 9.3*   HEMATOCRIT % 30.2*   PLATELETS Thousands/uL 236   NEUTROS PCT % 88*   LYMPHS PCT % 8*   MONOS PCT % 3*   EOS PCT % 0     Results from last 7 days   Lab Units 07/05/23  0837   POTASSIUM mmol/L 4.9   CHLORIDE mmol/L 99   CO2 mmol/L 23   BUN mg/dL 20   CREATININE mg/dL 1.22   CALCIUM mg/dL 8.8   ALK PHOS U/L 141*   ALT U/L 379*   AST U/L 363*     Results from last 7 days   Lab Units 07/05/23  0837   INR  1.15     Results from last 7 days   Lab Units 07/05/23  0829   POC GLUCOSE mg/dl 226*             Imaging: I have personally reviewed pertinent reports. VTE Prophylaxis: Enoxaparin (Lovenox)        Anticipated Length of Stay:  Patient will be admitted on an Inpatient basis with an anticipated length of stay of greater than 2 midnights. Justification for Hospital Stay: Patient has sepsis with hypotension. Responded to IV fluids. He needs IV cefepime and work-up for source of infection. Length of stay will be greater than 2 midnights          ** Please Note: This note has been constructed using a voice recognition system.  **

## 2023-07-05 NOTE — H&P
Assessment/Plan   * Sepsis [facility])  Assessment & Plan  Did have a cough last night which is not unusual for him. Nonproductive  He just finished a course of Bactrim less than a week ago for a UTI with a chronic indwelling Acosta catheter.     Now this morning he was noted by his daughter to be very lethargic, falls asleep while we are talking. He does not have any specific complaints though. Not short of breath, not coughing, no abdominal pain, does have a little bit of loose stool after the Bactrim but he would not say the diarrhea.     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 Otherwise I do not really see a source for infection other than these 2     We will send blood cultures, urine cultures  Follow sepsis pathway  Start him empirically on cefepime     Ask infectious disease to see the patient  Asked wound care to see the patient with this chest wall mass (he has refused work-up and treatment of this mass for years)        Elevated troponin  Assessment & Plan  He has no chest pain. This is not an acute coronary syndrome.     I would not further pursue this  Continue metoprolol           Chief Complaint:   Lethargy        History of Present Illness:     Perla Deal is a 80 y.o. male who presents with lethargy. He is brought in by his daughter. He falls asleep during my history taking. Patient was recently treated with Bactrim for UTI as he has a chronic Acosta catheter. He finished that less than a week ago per the daughter. Otherwise has been in his normal state of health. Last night prior to going to bed he did about an hour of coughing. But daughter states this is usually when he does at night. Is nothing new. Then this morning he was extremely lethargic. Really did not want to get out of bed. Was slumped over.   So she brought him to the ER. Lima Boggs He was noted to be hypotensive. This did respond nicely to IV fluid resuscitation. Patient is more awake now. But he does quickly fall asleep while we are talking. He does not have any specific complaints. No headache. No neck stiffness. No vision changes. No cough today. No shortness of breath. Does not feel like he has a fever. Does not feel like he has chills. Abdominal no abdominal pain. He has a chronic Acosta catheter so we cannot tell about dysuria. But not complaining of any bladder pain. He states his stools are a little loose. But he is only having 1-2 bowel movements per day. He relates this to taking Bactrim. He does have a mass in his left chest wall. He has had this for years. He did not want to pursue diagnosis and treatment so it has not been biopsied. But this is growing. Daughter has to change the dressing on an every day. It does have purulent drainage and is rather pungent.        Review of Systems:     Review of Systems   Constitutional: Negative for chills and fever. HENT: Negative for ear pain and sore throat. Eyes: Negative for pain and visual disturbance. Respiratory: Negative for cough and shortness of breath. Cardiovascular: Negative for chest pain and palpitations. Gastrointestinal: Negative for abdominal pain and vomiting. Genitourinary: Negative for dysuria and hematuria. Musculoskeletal: Negative for arthralgias and back pain. Skin: Negative for color change and rash. Neurological: Negative for seizures and syncope.    All other systems reviewed and are negative.           Past Medical and Surgical History:      Medical History        Past Medical History:   Diagnosis Date   • Coronary artery disease     • Diabetes mellitus ([address])     • Renal disorder              Surgical History         Past Surgical History:   Procedure Laterality Date   • CORONARY ANGIOPLASTY WITH STENT PLACEMENT                   Home Medications:    Prior to Admission medications    Medication Sig Start Date End Date Taking? Authorizing Provider   atorvastatin (LIPITOR) 20 mg tablet Take 20 mg by mouth daily     Yes Historical Provider, MD   losartan (COZAAR) 50 mg tablet Take 50 mg by mouth daily     Yes Historical Provider, MD   metoprolol succinate (TOPROL-XL) 50 mg 24 hr tablet Take 50 mg by mouth daily     Yes Historical Provider, MD   phenazopyridine (PYRIDIUM) 95 MG tablet Take 95 mg by mouth 3 (three) times a day as needed for bladder spasms     Yes Historical Provider, MD      I have reviewed home medications with patient personally.        Allergies: Allergies   Allergen Reactions   • Levaquin [Levofloxacin] Confusion            Social History:     Substance Use History:   Social History          Substance and Sexual Activity   Alcohol Use Never      Social History           Tobacco Use   Smoking Status Former   • Types: Cigarettes   Smokeless Tobacco Never      Social History      Substance and Sexual Activity   Drug Use Never            Family History:     non-contributory        Physical Exam:      Vitals:   Blood Pressure: 126/60 (07/05/23 1240)  Pulse: 67 (07/05/23 1240)  Temperature: 97.8 °F (36.6 °C) (07/05/23 0911)  Temp Source: Oral (07/05/23 0911)  Respirations: 18 (07/05/23 1240)  Height: 5' 8" (172.7 cm) (07/05/23 0828)  Weight - Scale: 62 kg (136 lb 11 oz) (07/05/23 1051)  SpO2: 97 % (07/05/23 1240)     Physical Exam  Vitals and nursing note reviewed. HENT:      Head: Normocephalic and atraumatic. Eyes:      Pupils: Pupils are equal, round, and reactive to light. Cardiovascular:      Rate and Rhythm: Normal rate and regular rhythm. Heart sounds: No murmur heard. No friction rub. No gallop. Pulmonary:      Effort: Pulmonary effort is normal.      Breath sounds: Normal breath sounds. No wheezing or rales. Abdominal:      General: Bowel sounds are normal.      Palpations: Abdomen is soft. Tenderness:  There is no abdominal tenderness. Musculoskeletal:      Right lower leg: No edema. Left lower leg: No edema. Comments: Left chest wall. Has a cancerous appearing mass. It is raised, about the size of a tennis ball. Red. Does have purulent drainage in the middle. Also extremely pungent odor.            .     Additional Data:      Lab Results: I have personally reviewed pertinent reports.            Results from last 7 days   Lab Units 07/05/23  0837   WBC Thousand/uL 20.54*   HEMOGLOBIN g/dL 9.3*   HEMATOCRIT % 30.2*   PLATELETS Thousands/uL 236   NEUTROS PCT % 88*   LYMPHS PCT % 8*   MONOS PCT % 3*   EOS PCT % 0           Results from last 7 days   Lab Units 07/05/23  0837   POTASSIUM mmol/L 4.9   CHLORIDE mmol/L 99   CO2 mmol/L 23   BUN mg/dL 20   CREATININE mg/dL 1.22   CALCIUM mg/dL 8.8   ALK PHOS U/L 141*   ALT U/L 379*   AST U/L 363*           Results from last 7 days   Lab Units 07/05/23  0837   INR   1.15           Results from last 7 days   Lab Units 07/05/23  0829   POC GLUCOSE mg/dl 226*                Imaging: I have personally reviewed pertinent reports.             VTE Prophylaxis: Enoxaparin (Lovenox)          Anticipated Length of Stay:  Patient will be admitted on an Inpatient basis with an anticipated length of stay of greater than 2 midnights. Justification for Hospital Stay: Patient has sepsis with hypotension. Responded to IV fluids. He needs IV cefepime and work-up for source of infection. Length of stay will be greater than 2 midnights              ** Please Note: This note has been constructed using a voice recognition system.  **

## 2023-07-05 NOTE — QUICK NOTE
Progress Note - Triage Asssessment   Kaushal Mcguire 80 y.o. male MRN: [id number]    Time Called ( Time): [id number]  Date Called: 07/05/23  Room#: ED 4  Person requesting evaluation: Dr. Christine Keith    Situation:    · Patient presents from home for lethargy, fatigue  · WBC 20  · LA 1.8  · Initial BP 77/47 -->/56 after 1L IVF   · Possible source for infection  · UA +infection, however, patient just finished a course of Bactrim for UT  · Soft tissue mass/wound on left chest wall with drainage and foul smelling     Interventions:   N/A due to hypotension being responsive to fluid resuscitation and other vitals stable.           Triage Assessment:     Patient to be admitted as a Step Down 2 level of care    Recommendations discussed with Dr. Florene Lesch, [place]

## 2023-07-06 ENCOUNTER — APPOINTMENT (INPATIENT)
Dept: RADIOLOGY | Facility: HOSPITAL | Age: 88
DRG: 698 | End: 2023-07-06
Payer: MEDICARE

## 2023-07-06 ENCOUNTER — APPOINTMENT (INPATIENT)
Dept: ULTRASOUND IMAGING | Facility: HOSPITAL | Age: 88
DRG: 698 | End: 2023-07-06
Payer: MEDICARE

## 2023-07-06 PROBLEM — N39.0 URINARY TRACT INFECTION: Status: ACTIVE | Noted: 2023-07-06

## 2023-07-06 PROBLEM — R78.81 GRAM-NEGATIVE BACTEREMIA: Status: ACTIVE | Noted: 2023-07-06

## 2023-07-06 PROBLEM — G93.41 ACUTE METABOLIC ENCEPHALOPATHY: Status: ACTIVE | Noted: 2023-07-06

## 2023-07-06 PROBLEM — E87.1 HYPONATREMIA: Status: ACTIVE | Noted: 2023-07-06

## 2023-07-06 PROBLEM — S32.039A L3 VERTEBRAL FRACTURE (HCC): Status: ACTIVE | Noted: 2023-07-06

## 2023-07-06 PROBLEM — R22.2 CHEST WALL MASS: Status: ACTIVE | Noted: 2023-07-06

## 2023-07-06 PROBLEM — R74.01 TRANSAMINITIS: Status: ACTIVE | Noted: 2023-07-06

## 2023-07-06 LAB
ALBUMIN SERPL BCP-MCNC: 2.7 G/DL (ref 3.5–5)
ALP SERPL-CCNC: 123 U/L (ref 34–104)
ALT SERPL W P-5'-P-CCNC: 281 U/L (ref 7–52)
ANION GAP SERPL CALCULATED.3IONS-SCNC: 4 MMOL/L
AST SERPL W P-5'-P-CCNC: 138 U/L (ref 13–39)
BASOPHILS # BLD AUTO: 0.02 THOUSANDS/ÂΜL (ref 0–0.1)
BASOPHILS NFR BLD AUTO: 0 % (ref 0–1)
BILIRUB SERPL-MCNC: 0.35 MG/DL (ref 0.2–1)
BUN SERPL-MCNC: 18 MG/DL (ref 5–25)
CALCIUM ALBUM COR SERPL-MCNC: 9.3 MG/DL (ref 8.3–10.1)
CALCIUM SERPL-MCNC: 8.3 MG/DL (ref 8.4–10.2)
CHLORIDE SERPL-SCNC: 108 MMOL/L (ref 96–108)
CO2 SERPL-SCNC: 22 MMOL/L (ref 21–32)
CREAT SERPL-MCNC: 0.65 MG/DL (ref 0.6–1.3)
EOSINOPHIL # BLD AUTO: 0.01 THOUSAND/ÂΜL (ref 0–0.61)
EOSINOPHIL NFR BLD AUTO: 0 % (ref 0–6)
ERYTHROCYTE [DISTWIDTH] IN BLOOD BY AUTOMATED COUNT: 16.5 % (ref 11.6–15.1)
GFR SERPL CREATININE-BSD FRML MDRD: 83 ML/MIN/1.73SQ M
GLUCOSE SERPL-MCNC: 102 MG/DL (ref 65–140)
GLUCOSE SERPL-MCNC: 109 MG/DL (ref 65–140)
GLUCOSE SERPL-MCNC: 113 MG/DL (ref 65–140)
GLUCOSE SERPL-MCNC: 139 MG/DL (ref 65–140)
GLUCOSE SERPL-MCNC: 141 MG/DL (ref 65–140)
HCT VFR BLD AUTO: 27 % (ref 36.5–49.3)
HGB BLD-MCNC: 8.3 G/DL (ref 12–17)
IMM GRANULOCYTES # BLD AUTO: 0.04 THOUSAND/UL (ref 0–0.2)
IMM GRANULOCYTES NFR BLD AUTO: 1 % (ref 0–2)
LYMPHOCYTES # BLD AUTO: 0.79 THOUSANDS/ÂΜL (ref 0.6–4.47)
LYMPHOCYTES NFR BLD AUTO: 11 % (ref 14–44)
MAGNESIUM SERPL-MCNC: 1.9 MG/DL (ref 1.9–2.7)
MCH RBC QN AUTO: 29.5 PG (ref 26.8–34.3)
MCHC RBC AUTO-ENTMCNC: 30.7 G/DL (ref 31.4–37.4)
MCV RBC AUTO: 96 FL (ref 82–98)
MONOCYTES # BLD AUTO: 0.4 THOUSAND/ÂΜL (ref 0.17–1.22)
MONOCYTES NFR BLD AUTO: 5 % (ref 4–12)
NEUTROPHILS # BLD AUTO: 6.29 THOUSANDS/ÂΜL (ref 1.85–7.62)
NEUTS SEG NFR BLD AUTO: 83 % (ref 43–75)
NRBC BLD AUTO-RTO: 0 /100 WBCS
PLATELET # BLD AUTO: 156 THOUSANDS/UL (ref 149–390)
PMV BLD AUTO: 9.3 FL (ref 8.9–12.7)
POTASSIUM SERPL-SCNC: 4.5 MMOL/L (ref 3.5–5.3)
PROCALCITONIN SERPL-MCNC: 1.98 NG/ML
PROT SERPL-MCNC: 5.7 G/DL (ref 6.4–8.4)
RBC # BLD AUTO: 2.81 MILLION/UL (ref 3.88–5.62)
SODIUM SERPL-SCNC: 134 MMOL/L (ref 135–147)
WBC # BLD AUTO: 7.55 THOUSAND/UL (ref 4.31–10.16)

## 2023-07-06 PROCEDURE — 99232 SBSQ HOSP IP/OBS MODERATE 35: CPT | Performed by: PHYSICIAN ASSISTANT

## 2023-07-06 PROCEDURE — 85025 COMPLETE CBC W/AUTO DIFF WBC: CPT

## 2023-07-06 PROCEDURE — 82948 REAGENT STRIP/BLOOD GLUCOSE: CPT

## 2023-07-06 PROCEDURE — 97167 OT EVAL HIGH COMPLEX 60 MIN: CPT

## 2023-07-06 PROCEDURE — 99223 1ST HOSP IP/OBS HIGH 75: CPT | Performed by: SURGERY

## 2023-07-06 PROCEDURE — 99446 NTRPROF PH1/NTRNET/EHR 5-10: CPT | Performed by: PHYSICIAN ASSISTANT

## 2023-07-06 PROCEDURE — 97163 PT EVAL HIGH COMPLEX 45 MIN: CPT

## 2023-07-06 PROCEDURE — NC001 PR NO CHARGE: Performed by: PHYSICIAN ASSISTANT

## 2023-07-06 PROCEDURE — 99223 1ST HOSP IP/OBS HIGH 75: CPT | Performed by: PHYSICIAN ASSISTANT

## 2023-07-06 PROCEDURE — 80053 COMPREHEN METABOLIC PANEL: CPT

## 2023-07-06 PROCEDURE — 76705 ECHO EXAM OF ABDOMEN: CPT

## 2023-07-06 PROCEDURE — 83735 ASSAY OF MAGNESIUM: CPT

## 2023-07-06 PROCEDURE — 97116 GAIT TRAINING THERAPY: CPT

## 2023-07-06 PROCEDURE — 84145 PROCALCITONIN (PCT): CPT

## 2023-07-06 PROCEDURE — 87040 BLOOD CULTURE FOR BACTERIA: CPT | Performed by: PHYSICIAN ASSISTANT

## 2023-07-06 RX ADMIN — SODIUM CHLORIDE 75 ML/HR: 0.9 INJECTION, SOLUTION INTRAVENOUS at 10:37

## 2023-07-06 RX ADMIN — METRONIDAZOLE 500 MG: 500 INJECTION, SOLUTION INTRAVENOUS at 11:53

## 2023-07-06 RX ADMIN — CEFEPIME HYDROCHLORIDE 2000 MG: 2 INJECTION, SOLUTION INTRAVENOUS at 18:52

## 2023-07-06 RX ADMIN — ENOXAPARIN SODIUM 40 MG: 100 INJECTION SUBCUTANEOUS at 08:44

## 2023-07-06 RX ADMIN — METOPROLOL SUCCINATE 50 MG: 50 TABLET, EXTENDED RELEASE ORAL at 08:43

## 2023-07-06 RX ADMIN — CEFEPIME HYDROCHLORIDE 2000 MG: 2 INJECTION, SOLUTION INTRAVENOUS at 07:16

## 2023-07-06 RX ADMIN — ATORVASTATIN CALCIUM 20 MG: 20 TABLET, FILM COATED ORAL at 16:42

## 2023-07-06 RX ADMIN — METRONIDAZOLE 500 MG: 500 INJECTION, SOLUTION INTRAVENOUS at 19:38

## 2023-07-06 RX ADMIN — METRONIDAZOLE 500 MG: 500 INJECTION, SOLUTION INTRAVENOUS at 04:32

## 2023-07-06 NOTE — CASE MANAGEMENT
Case Management Assessment & Discharge Planning Note    Patient name Loco Llamas  Location /-01 MRN [id number]  : 1929 Date 2023       Current Admission Date: 2023  Current Admission Diagnosis:Sepsis [facility])   Patient Active Problem List    Diagnosis Date Noted   • Urinary tract infection 2023   • Transaminitis 2023   • Chest wall mass 2023   • Hyponatremia 2023   • L3 vertebral fracture ([address]) 2023   • Sepsis ([address]) 2023   • Elevated troponin 2023      LOS (days): 1  Geometric Mean LOS (GMLOS) (days): 3.50  Days to GMLOS:2.4     OBJECTIVE:    Risk of Unplanned Readmission Score: 11.01      Current admission status: Inpatient    Preferred Pharmacy: No Pharmacies Listed  Primary Care Provider: No primary care provider on file. Primary Insurance: MEDICARE  Secondary Insurance:     ASSESSMENT:   [place], [address]   Primary Phone: [phone] (Mobile)               Advance Directives  Does patient have a [address]?: Yes  Does patient have Advance Directives?: Yes  Advance Directives: Living will, Power of  for health care  Primary Contact: Irene London: wife: [phone]    Readmission Root Cause  30 Day Readmission: No    Patient Information  Admitted from[de-identified] Home  Mental Status: Alert  During Assessment patient was accompanied by: Spouse  Assessment information provided by[de-identified] Spouse  Support Systems: Self, Spouse/significant other, Family members  [place] of Residence: [facility] do you live in?: [address] entry access options.  Select all that apply.: Stairs  Number of steps to enter home.:  (1/2 step to enter)  Do the steps have railings?: Yes  Type of Current Residence: [place]  In the last 12 months, was there a time when you were not able to pay the mortgage or rent on time?: No  In the last 12 months, how many places have you lived?: 1  In the last 15 months, was there a time when you did not have a steady place to sleep or slept in a shelter (including now)?: No  Homeless/housing insecurity resource given?: N/A  Living Arrangements: Lives w/ Spouse/significant other  Is patient a ?: Yes    Activities of Daily Living Prior to Admission  Functional Status: Assistance  Completes ADLs independently?: No  Level of ADL dependence: Assistance  Ambulates independently?: Yes (walker and wheelchair)  Does patient use assisted devices?: Yes  Assisted Devices (DME) used:  Wheelchair  Does patient currently own DME?: Yes  What DME does the patient currently own?: Jack Goltz, Wheelchair (raised toilet seat)  Does patient have a history of Outpatient Therapy (PT/OT)?: No  Does the patient have a history of Short-Term Rehab?: Yes  Does patient have a history of HHC?: No  Does patient currently have [address]?: No    Patient Information Continued  Income Source: Pension/halfway  Does patient have prescription coverage?: Yes  Within the past 12 months, you worried that your food would run out before you got the money to buy more.: Never true  Within the past 12 months, the food you bought just didn't last and you didn't have money to get more.: Never true  Food insecurity resource given?: N/A  Does patient receive dialysis treatments?: No  Does patient have a history of substance abuse?: No  Does patient have a history of Mental Health Diagnosis?: No      Means of Transportation  Means of Transport to Appts[de-identified] Family transport  In the past 12 months, has lack of transportation kept you from medical appointments or from getting medications?: No  In the past 12 months, has lack of transportation kept you from meetings, work, or from getting things needed for daily living?: No  Was application for public transport provided?: N/A    DISCHARGE DETAILS:    Discharge planning discussed with[de-identified] patient's wife     Comments - Freedom of Choice: Pt's wife reports her plan is have Pt to return home. Does not want Pt to go to rehab facility  CM contacted family/caregiver?: Yes    Contacts  Patient Contacts: Morelia Hinson: wife  Relationship to Patient[de-identified] Family  Contact Method: Phone, In Person  Phone Number: [phone]  Reason/Outcome: Emergency Contact, Discharge Planning      Additional Comments: Met with Pt and Pt's wife at bedside. Discussed role of case management. Pt lives with wife in 4sh, 1/2 ramin. Uses walker, wheelchair, raised toliet seat. Pt's wife assists with adls. Family provides transportation. Pt's wife reports she is Pt's POA and Pt has living will. Pt's wife reports Pt has hx of SNF and was not a good experience. Pt's wife denies hx of VNA. Pt's wife reports plan is to take Pt home and not SNF or home care. CM to follow.

## 2023-07-06 NOTE — WOUND OSTOMY CARE
Consult Note - Wound   Ed Right 80 y.o. male MRN: [id number]  Unit/Bed#: -01 Encounter: [id number]        History and Present Illness:  Patient is a 81 yo male that was admitted to [facility] for treatment of sepsis. Patient has a PMH of CAD,DM. Patient is a min/moderate assist for turning and repositioning. Patient is bony and frail in appearance. Patient is incontinent of bowel and bladder is managed internal urinary catheter. On assessment, patient is lying on regular mattress. P-500 specialty . Wound Care was consulted for multiple wounds. Assessment Findings:   1. 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 Irregular in shape area with at least full thickness skin loss. Wound bed is 90% beefy red bleeding hypergranulation tissue with 10% yellow slough tissue scattered throughout. Katherine-wound is fragile. Small to moderate sanguinous and bloody drainage noted. Growth protrudes 1cms. Area was cleansed and dressed with maxorb ag and allevyn foam dressing. Primary team made aware for need for surgical oncology for area. 2. POA Right Sacrum Evolving Deep Tissue Pressure Injury: irregular in shape area of full thickness skin loss. Wound bed is 20% dark purple non-blanchable tissue and 80% yellow slough tissue- cannot appreciate true depth related to slough tissue obscuring wound bed. Katherine-wound is hyperpigmented and fragile. Scant yellow drainage noted. Recommend triad paste to wound bed and covering area with allevyn foam dressing. Deep Tissue Pressure Injury wounds have the potential to evolve into unstageable, stage III or stage IV wounds. 3. Right Buttocks MASD: area was pictured with sacrum wound. small area of partial thickness skin loss that measures 0.5x0.7x0.1cms. Wound bed is beefy red and bleeding and blanches. Likely moisture in etiology. Katherine-wound is hyperpigmented.  Scant sanguinous drainage noted. Recommend calazime to area. 4. B/L Back Wounds: 3 areas of partial thickness skin loss measured and pictured together. Wound beds are beefy red and pink in color and slow to erika. Unclear etiology. Scant serosanguineous drainage noted. Recommend allevyn foam dressings to areas for treatment. 5. POA Left Lateral Heel Stage 2 Pressure Injury: irregular in shape area of partial thickness skin loss with a beefy red, non-blanchable wound bed. Katherine-wound is intact and fragile. No drainage noted. allevyn foam dressing applied to area for treatment and foot offloaded with prevalon offloading boot. Right heel is dry, intact, and blanches with no skin loss, wounds or drainage noted. Recommend preventative allevyn foam dressing to area and offloading boot. 6. Penis is intact. 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 No drainage noted. Urology following. 7. Left Face: Circular area of raised beefy red, dry tissue. No skin loss or drainage present. Possibly cancerous in etiology. Primary team made aware of findings. Leave area CHLOE. No induration, fluctuance, odor, warmth/temperature differences, redness, or purulence noted to the above noted wounds and skin areas assessed. New dressings applied per orders listed below. Patient tolerated well- no s/s of non-verbal pain or discomfort observed during the encounter. Bedside nurse aware of plan of care. See flow sheets for more detailed assessment findings. Orders listed below and wound care will continue to follow, call or tiger text with questions. Skin Care Plan:  1-Cleanse sacro-buttocks with soap and water. Apply calazime cream to b/l buttocks TID and PRN episodes of incontinence. 2-Turn/reposition q2h or when medically stable for pressure re-distribution on skin . 3-Elevate heels to offload pressure.  Apply Prevalon Boots/ True View Offloading Boots to B/L Feet  4-Moisturize skin daily with skin nourishing cream  5-Ehob cushion in chair when out of bed. Specialty Mattress Ordered for Patient. 6-B/L Heels: Apply Allevyn Foam Dressings. Michele Left T for Treatment and Michele Right P for Prevention. Change every other day or PRN. 7-Right Sacrum Wound: Cleanse area with NS and pat dry. Apply Triad Paste to wound bed and cover area with allevyn foam dressing. Michele with T for Treatment and change daily or PRN soilage/displacement. 8-Left Chest Wound: Cleanse area with NS and pat dry. Apply maxorb ag to wound bed and cover with large sacral allevyn foam dressing. Michele with T for Treatment and change daily or PRN soilage/displacement. Wounds:  Wound 07/05/23 Chest Left (Active)   Wound Image   07/06/23 1039   Wound Description Hypergranulation; Beefy red;Bleeding;Yellow;Slough;Drainage 07/06/23 1100   Katherine-wound Assessment Fragile 07/06/23 1100   Wound Length (cm) 7 cm 07/06/23 1039   Wound Width (cm) 7.2 cm 07/06/23 1039   Wound Depth (cm) 0.2 cm 07/06/23 1039   Wound Surface Area (cm^2) 50.4 cm^2 07/06/23 1039   Wound Volume (cm^3) 10.08 cm^3 07/06/23 1039   Calculated Wound Volume (cm^3) 10.08 cm^3 07/06/23 1039   Drainage Amount Moderate 07/06/23 1100   Drainage Description Bloody; Sanguineous 07/06/23 1100   Non-staged Wound Description Full thickness 07/06/23 1100   Treatments Cleansed;Irrigation with NSS;Site care 07/06/23 1100   Dressing Calcium Alginate with Silver; Foam, Silicon (eg. Allevyn, etc) 07/06/23 1100   Dressing Changed New 07/06/23 1100   Patient Tolerance Tolerated well 07/06/23 1100   Dressing Status Clean;Dry; Intact 07/06/23 1100       Wound 07/05/23 Pressure Injury Sacrum Right (Active)   Wound Image   07/06/23 1047   Wound Description Light purple;Non-blanchable erythema;Yellow;Slough 07/06/23 1047   Pressure Injury Stage DTPI 07/06/23 1047   Katherine-wound Assessment Hyperpigmented;Fragile 07/06/23 1047   Wound Length (cm) 3 cm 07/06/23 1047   Wound Width (cm) 3.5 cm 07/06/23 1047   Wound Depth (cm) 0.2 cm 07/06/23 1047   Wound Surface Area (cm^2) 10.5 cm^2 07/06/23 1047   Wound Volume (cm^3) 2.1 cm^3 07/06/23 1047   Calculated Wound Volume (cm^3) 2.1 cm^3 07/06/23 1047   Drainage Amount Scant 07/06/23 1047   Drainage Description Yellow 07/06/23 1047   Non-staged Wound Description Full thickness 07/06/23 1047   Treatments Cleansed;Irrigation with NSS;Site care 07/06/23 1047   Dressing Other (Comment); Foam, Silicon (eg. Allevyn, etc) 07/06/23 1047   Dressing Changed New 07/06/23 1047   Patient Tolerance Tolerated well 07/06/23 1047   Dressing Status Clean;Dry; Intact 07/06/23 1047       Wound 07/05/23 Back Bilateral (Active)   Wound Image   07/06/23 1045   Wound Description Beefy red;Pink 07/06/23 1045   Katherine-wound Assessment Fragile 07/06/23 1045   Wound Length (cm) 2 cm 07/06/23 1045   Wound Width (cm) 23 cm 07/06/23 1045   Wound Depth (cm) 0.1 cm 07/06/23 1045   Wound Surface Area (cm^2) 46 cm^2 07/06/23 1045   Wound Volume (cm^3) 4.6 cm^3 07/06/23 1045   Calculated Wound Volume (cm^3) 4.6 cm^3 07/06/23 1045   Drainage Amount Scant 07/06/23 1045   Drainage Description Serosanguineous 07/06/23 1045   Non-staged Wound Description Partial thickness 07/06/23 1045   Treatments Cleansed;Irrigation with NSS;Site care 07/06/23 1045   Dressing Foam, Silicon (eg. Allevyn, etc) 07/06/23 1045   Dressing Changed New 07/06/23 1045   Patient Tolerance Tolerated well 07/06/23 1045   Dressing Status Clean;Dry; Intact 07/06/23 1045       Wound 07/05/23 Pressure Injury Heel Left;Lateral (Active)   Wound Image   07/06/23 1043   Wound Description Non-blanchable erythema 07/06/23 1047   Pressure Injury Stage 2 07/06/23 1047   Katherine-wound Assessment Intact;Fragile 07/06/23 1047   Wound Length (cm) 7.5 cm 07/05/23 1456   Wound Width (cm) 6 cm 07/05/23 1456   Wound Depth (cm) 0.1 cm 07/06/23 1047   Wound Surface Area (cm^2) 45 cm^2 07/05/23 1456   Drainage Amount None 07/06/23 1047   Non-staged Wound Description Partial thickness 07/06/23 1047   Treatments Cleansed;Irrigation with NSS;Site care;Elevated 07/06/23 1047   Dressing Foam, Silicon (eg. Allevyn, etc) 07/06/23 1047   Dressing Changed Reinforced 07/06/23 1047   Patient Tolerance Tolerated well 07/06/23 1047   Dressing Status Clean;Dry; Intact 07/06/23 1047       Wound 07/05/23 Penis (Active)   Wound Image   07/06/23 1042   Wound Description Intact 07/06/23 1047   Katherine-wound Assessment Intact 07/06/23 1047   Wound Length (cm) 0 cm 07/06/23 1047   Wound Width (cm) 0 cm 07/06/23 1047   Wound Depth (cm) 0 cm 07/06/23 1047   Wound Surface Area (cm^2) 0 cm^2 07/06/23 1047   Wound Volume (cm^3) 0 cm^3 07/06/23 1047   Calculated Wound Volume (cm^3) 0 cm^3 07/06/23 1047       Wound 07/06/23 Face Left (Active)   Wound Image   07/06/23 1037   Wound Description Beefy red;Dry 07/06/23 1122   Katherine-wound Assessment Intact 07/06/23 1122   Wound Length (cm) 0.5 cm 07/06/23 1037   Wound Width (cm) 0.5 cm 07/06/23 1037   Wound Depth (cm) 0 cm 07/06/23 1037   Wound Surface Area (cm^2) 0.25 cm^2 07/06/23 1037   Wound Volume (cm^3) 0 cm^3 07/06/23 1037   Calculated Wound Volume (cm^3) 0 cm^3 07/06/23 1037   Drainage Amount None 07/06/23 1122   Non-staged Wound Description Not applicable 23/75/80 0787   Treatments Cleansed;Site care 07/06/23 1122   Dressing Open to air 07/06/23 [address] 07/06/23 1122   Patient Tolerance Tolerated well 07/06/23 1122   Dressing Status Intact 07/06/23 1122               Michelle Temple RN, BSN, [employer]

## 2023-07-06 NOTE — PROGRESS NOTES
[facility]  Progress Note  Name: Raheem Lou  MRN: [id number]  Unit/Bed#: -01 I Date of Admission: 7/5/2023   Date of Service: 7/6/2023 I Hospital Day: 1    Assessment/Plan   * Sepsis [facility])  Assessment & Plan  · Presented to the emergency department due to sudden weakness, lethargy  · Found to be hypotensive in the ED. Met sepsis criteria with HR > 90 and leukocytosis of 20 K suspected due to urinary tract infection  · CT chest/abdomen/pelvis with diffuse bladder wall thickening. Questionable consolidation of the left lung which could be atelectasis. Trace pericholecystic fluid -correlate for acute cholecystitis. Ulcerated irregular left chest wall mass. · Blood pressure improved after IVF resuscitation. Remains afebrile  · Leukocytosis resolved  · Procalcitonin downtrending  · Right upper quadrant ultrasound pending  · Urine culture pending  · Initial blood culture positive x2 for GNR - prelim ID enterobacter recommended IV cefepime. Currently on IV cefepime/Flagyl.   Repeat blood cultures x2 obtained and pending  · ID consulted - appreciate recommendations    Urinary tract infection  Assessment & Plan  · With chronic Conde catheter  · Urology consulted given CT findings and concern for penile wound related to chronic conde  · UA innumerable WBC, innumerable bacteria  · Urine culture pending  · Continue IV cefepime    Gram-negative bacteremia  Assessment & Plan  · Blood cultures positive x2 on admission for gram-negative rods  · Prelim ID panel Enterobacter  · Continue IV cefepime  · Leukocytosis resolved, procalcitonin downtrending  · Suspect urinary source  · ID following  · Trend WBC and fever curve    Acute metabolic encephalopathy  Assessment & Plan  · Presented with increased lethargy  · CT head negative for acute intracranial abnormalities  · Likely related to sepsis and hypotension  · Mentation greatly improved this morning, awake and alert  · Continue supportive care and treatment of metabolic abnormalities    L3 vertebral fracture (HCC)  Assessment & Plan  · Noted on CT abdomen/pelvis  · Patient denies any back pain  · No lower extremity weakness. Has chronic Acosta catheter  · Discussed with neurosurgery - no interventions. Will obtain baseline XR. No indication for brace unless patient has pain/radicular sx    Hyponatremia  Assessment & Plan  · Sodium up to 134 on admission  · Initial hyponatremia likely related to hypovolemia  · Continue gentle IVF  · Trend BMP    Chest wall mass  Assessment & Plan  · Patient with known chest wall mass that has been present for several years  · Reportedly has never been evaluated or biopsied  · Patient adamantly refusing further workup for it at this time -discussed at length that there is concern this could be cancerous. Patient still refusing    Transaminitis  Assessment & Plan  ·   alk phos 123 normal T. Bili  · May be related to profound hypotension present on admission now resolved  · Right upper quadrant ultrasound pending  ·     Elevated troponin  Assessment & Plan  · Denies chest pain  · Likely related to hypotension, sepsis          VTE Pharmacologic Prophylaxis: VTE Score: 6 High Risk (Score >/= 5) - Pharmacological DVT Prophylaxis Ordered: enoxaparin (Lovenox). Sequential Compression Devices Ordered. Patient Centered Rounds: I performed bedside rounds with nursing staff today. Discussions with Specialists or Other Care Team Provider: CM, ID, surgery AP, wound care    Education and Discussions with Family / Patient: Updated  (wife) via phone.     Total Time Spent on Date of Encounter in care of patient: 45 minutes This time was spent on one or more of the following: performing physical exam; counseling and coordination of care; obtaining or reviewing history; documenting in the medical record; reviewing/ordering tests, medications or procedures; communicating with other healthcare professionals and discussing with patient's family/caregivers. Current Length of Stay: 1 day(s)  Current Patient Status: Inpatient   Certification Statement: The patient will continue to require additional inpatient hospital stay due to Sepsis, bacteremia  Discharge Plan: Anticipate discharge in 48-72 hrs to discharge location to be determined pending rehab evaluations. Code Status: Level 1 - Full Code    Subjective:   Patient awake and alert this morning, no events overnight. Denies chest pain/palpitations, shortness of breath, nausea/vomiting, abdominal pain. No bowel movement since admission per nursing staff - reported diarrhea at home prehospital.    Objective:     Vitals:   Temp (24hrs), Av.8 °F (36.6 °C), Min:97.4 °F (36.3 °C), Max:98 °F (36.7 °C)    Temp:  [97.4 °F (36.3 °C)-98 °F (36.7 °C)] 97.9 °F (36.6 °C)  HR:  [57-75] 62  Resp:  [12-23] 17  BP: (124-153)/(56-68) 153/68  SpO2:  [94 %-97 %] 96 %  Body mass index is 20.78 kg/m². Input and Output Summary (last 24 hours): Intake/Output Summary (Last 24 hours) at 2023 1550  Last data filed at 2023 0800  Gross per 24 hour   Intake 2780 ml   Output 795 ml   Net 1985 ml       Physical Exam:   Physical Exam  Vitals and nursing note reviewed. Constitutional:       General: He is not in acute distress. Appearance: He is well-developed. Comments: No acute distress   HENT:      Head: Normocephalic and atraumatic. Eyes:      General: No scleral icterus. Extraocular Movements: Extraocular movements intact. Conjunctiva/sclera: Conjunctivae normal.   Cardiovascular:      Rate and Rhythm: Normal rate and regular rhythm. Heart sounds: No murmur heard. Pulmonary:      Effort: Pulmonary effort is normal. No respiratory distress. Breath sounds: Normal breath sounds. No wheezing, rhonchi or rales. Abdominal:      General: Bowel sounds are normal.      Palpations: Abdomen is soft. Tenderness:  There is no abdominal tenderness. There is no guarding or rebound. Musculoskeletal:         General: No swelling. Cervical back: Normal range of motion. Comments: Able to move upper/lower extremities bilaterally. No back pain on exam.  Trace pedal edema   Skin:     General: Skin is warm and dry. Capillary Refill: Capillary refill takes less than 2 seconds. Neurological:      Mental Status: He is alert. Mental status is at baseline. Psychiatric:         Mood and Affect: Mood normal.         Speech: Speech normal.         Behavior: Behavior normal.          Additional Data:     Labs:  Results from last 7 days   Lab Units 07/06/23  0438   WBC Thousand/uL 7.55   HEMOGLOBIN g/dL 8.3*   HEMATOCRIT % 27.0*   PLATELETS Thousands/uL 156   NEUTROS PCT % 83*   LYMPHS PCT % 11*   MONOS PCT % 5   EOS PCT % 0     Results from last 7 days   Lab Units 07/06/23  0438   SODIUM mmol/L 134*   POTASSIUM mmol/L 4.5   CHLORIDE mmol/L 108   CO2 mmol/L 22   BUN mg/dL 18   CREATININE mg/dL 0.65   ANION GAP mmol/L 4   CALCIUM mg/dL 8.3*   ALBUMIN g/dL 2.7*   TOTAL BILIRUBIN mg/dL 0.35   ALK PHOS U/L 123*   ALT U/L 281*   AST U/L 138*   GLUCOSE RANDOM mg/dL 109     Results from last 7 days   Lab Units 07/05/23  0837   INR  1.15     Results from last 7 days   Lab Units 07/06/23  1131 07/06/23  0608 07/05/23  2107 07/05/23  1802 07/05/23  0829   POC GLUCOSE mg/dl 139 113 262* 244* 226*         Results from last 7 days   Lab Units 07/06/23  0438 07/05/23  1547 07/05/23  0837   LACTIC ACID mmol/L  --  1.5 1.8   PROCALCITONIN ng/ml 1.98* 3.21* 4.96*       Lines/Drains:  Invasive Devices     Peripheral Intravenous Line  Duration           Peripheral IV 07/05/23 Dorsal (posterior); Right Forearm 1 day    Peripheral IV 07/05/23 Right Antecubital 1 day          Drain  Duration           Urethral Catheter Latex 16 Fr. 1 day              Urinary Catheter:  Goal for removal: N/A - Chronic Acosta               Imaging: Reviewed radiology reports from this admission including: chest CT scan and abdominal/pelvic CT    Recent Cultures (last 7 days):   Results from last 7 days   Lab Units 07/06/23  0749 07/05/23  1049 07/05/23  0837   BLOOD CULTURE  Received in Microbiology Lab. Culture in Progress. Received in Microbiology Lab. Culture in Progress. --   --    GRAM STAIN RESULT   --   --  Gram negative rods*  Gram negative rods*   URINE CULTURE   --  >100,000 cfu/ml Gram Negative Rosalio Enteric Like*  >100,000 cfu/ml Enterococcus species*  --        Last 24 Hours Medication List:   Current Facility-Administered Medications   Medication Dose Route Frequency Provider Last Rate   • acetaminophen  650 mg Oral Q6H PRN Ethyl Karthik Prechtel, DO     • atorvastatin  20 mg Oral Daily With [employer] S Prechtel, DO     • cefepime  2,000 mg Intravenous Q12H Adelfo S Prechtel, DO Stopped (07/06/23 0800)   • enoxaparin  40 mg Subcutaneous Daily Adelfo S Prechtel, DO     • insulin lispro  1-5 Units Subcutaneous TID AC Yang Hernandezr, CRNP     • insulin lispro  1-5 Units Subcutaneous HS Yang Stager, CRTERRIE     • metoprolol succinate  50 mg Oral Daily Adelfo S Prechtel, DO     • metroNIDAZOLE  500 mg Intravenous Q8H [facility] Falino,  mg (07/06/23 1153)   • ondansetron  4 mg Intravenous Q6H PRN Ethyl Karthik Prechtel, DO     • phenazopyridine  100 mg Oral TID PRN Ethyl Karthik Prechtel, DO     • sodium chloride  75 mL/hr Intravenous Continuous KAREEM Wasserman 75 mL/hr (07/06/23 1037)        Today, Patient Was Seen By: Heber Thurman PA-C    **Please Note: This note may have been constructed using a voice recognition system. **

## 2023-07-06 NOTE — PLAN OF CARE
Problem: MOBILITY - ADULT  Goal: Maintain or return to baseline ADL function  Description: INTERVENTIONS:  -  Assess patient's ability to carry out ADLs; assess patient's baseline for ADL function and identify physical deficits which impact ability to perform ADLs (bathing, care of mouth/teeth, toileting, grooming, dressing, etc.)  - Assess/evaluate cause of self-care deficits   - Assess range of motion  - Assess patient's mobility; develop plan if impaired  - Assess patient's need for assistive devices and provide as appropriate  - Encourage maximum independence but intervene and supervise when necessary  - Involve family in performance of ADLs  - Assess for home care needs following discharge   - Consider OT consult to assist with ADL evaluation and planning for discharge  - Provide patient education as appropriate  Outcome: Progressing  Goal: Maintains/Returns to pre admission functional level  Description: INTERVENTIONS:  - Perform BMAT or MOVE assessment daily.   - Set and communicate daily mobility goal to care team and patient/family/caregiver. - Collaborate with rehabilitation services on mobility goals if consulted  - Perform Range of Motion 3 times a day. - Reposition patient every 2 hours.   - Dangle patient 3 times a day  - Stand patient 3 times a day  - Ambulate patient 3 times a day  - Out of bed to chair 3 times a day   - Out of bed for meals 3 times a day  - Out of bed for toileting  - Record patient progress and toleration of activity level   Outcome: Progressing     Problem: Prexisting or High Potential for Compromised Skin Integrity  Goal: Skin integrity is maintained or improved  Description: INTERVENTIONS:  - Identify patients at risk for skin breakdown  - Assess and monitor skin integrity  - Assess and monitor nutrition and hydration status  - Monitor labs   - Assess for incontinence   - Turn and reposition patient  - Assist with mobility/ambulation  - Relieve pressure over bony prominences  - Avoid friction and shearing  - Provide appropriate hygiene as needed including keeping skin clean and dry  - Evaluate need for skin moisturizer/barrier cream  - Collaborate with interdisciplinary team   - Patient/family teaching  - Consider wound care consult   Outcome: Progressing     Problem: PAIN - ADULT  Goal: Verbalizes/displays adequate comfort level or baseline comfort level  Description: Interventions:  - Encourage patient to monitor pain and request assistance  - Assess pain using appropriate pain scale  - Administer analgesics based on type and severity of pain and evaluate response  - Implement non-pharmacological measures as appropriate and evaluate response  - Consider cultural and social influences on pain and pain management  - Notify physician/advanced practitioner if interventions unsuccessful or patient reports new pain  Outcome: Progressing     Problem: INFECTION - ADULT  Goal: Absence or prevention of progression during hospitalization  Description: INTERVENTIONS:  - Assess and monitor for signs and symptoms of infection  - Monitor lab/diagnostic results  - Monitor all insertion sites, i.e. indwelling lines, tubes, and drains  - Monitor endotracheal if appropriate and nasal secretions for changes in amount and color  - Call appropriate cooling/warming therapies per order  - Administer medications as ordered  - Instruct and encourage patient and family to use good hand hygiene technique  - Identify and instruct in appropriate isolation precautions for identified infection/condition  Outcome: Progressing     Problem: SAFETY ADULT  Goal: Patient will remain free of falls  Description: INTERVENTIONS:  - Educate patient/family on patient safety including physical limitations  - Instruct patient to call for assistance with activity   - Consult OT/PT to assist with strengthening/mobility   - Keep Call bell within reach  - Keep bed low and locked with side rails adjusted as appropriate  - Keep care items and personal belongings within reach  - Initiate and maintain comfort rounds  - Make Fall Risk Sign visible to staff  - Offer Toileting every 2 Hours, in advance of need  - Initiate/Maintain bed alarm  - Obtain necessary fall risk management equipmen  - Apply yellow socks and bracelet for high fall risk patients  - Consider moving patient to room near nurses station  Outcome: Progressing     Problem: CARDIOVASCULAR - ADULT  Goal: Maintains optimal cardiac output and hemodynamic stability  Description: INTERVENTIONS:  - Monitor I/O, vital signs and rhythm  - Monitor for S/S and trends of decreased cardiac output  - Administer and titrate ordered vasoactive medications to optimize hemodynamic stability  - Assess quality of pulses, skin color and temperature  - Assess for signs of decreased coronary artery perfusion  - Instruct patient to report change in severity of symptoms  Outcome: Progressing     Problem: RESPIRATORY - ADULT  Goal: Achieves optimal ventilation and oxygenation  Description: INTERVENTIONS:  - Assess for changes in respiratory status  - Assess for changes in mentation and behavior  - Position to facilitate oxygenation and minimize respiratory effort  - Oxygen administered by appropriate delivery if ordered  - Initiate smoking cessation education as indicated  - Encourage broncho-pulmonary hygiene including cough, deep breathe, Incentive Spirometry  - Assess the need for suctioning and aspirate as needed  - Assess and instruct to report SOB or any respiratory difficulty  - Respiratory Therapy support as indicated  Outcome: Progressing     Problem: GENITOURINARY - ADULT  Goal: Urinary catheter remains patent  Description: INTERVENTIONS:  - Assess patency of urinary catheter  - If patient has a chronic conde, consider changing catheter if non-functioning  - Follow guidelines for intermittent irrigation of non-functioning urinary catheter  Outcome: Progressing     Problem: Nutrition/Hydration-ADULT  Goal: Nutrient/Hydration intake appropriate for improving, restoring or maintaining nutritional needs  Description: Monitor and assess patient's nutrition/hydration status for malnutrition. Collaborate with interdisciplinary team and initiate plan and interventions as ordered. Monitor patient's weight and dietary intake as ordered or per policy. Utilize nutrition screening tool and intervene as necessary. Determine patient's food preferences and provide high-protein, high-caloric foods as appropriate.      INTERVENTIONS:  - Monitor oral intake, urinary output, labs, and treatment plans  - Assess nutrition and hydration status and recommend course of action  - Evaluate amount of meals eaten  - Assist patient with eating if necessary   - Allow adequate time for meals  - Recommend/ encourage appropriate diets, oral nutritional supplements, and vitamin/mineral supplements  - Order, calculate, and assess calorie counts as needed  - Recommend, monitor, and adjust tube feedings and TPN/PPN based on assessed needs  - Assess need for intravenous fluids  - Provide specific nutrition/hydration education as appropriate  - Include patient/family/caregiver in decisions related to nutrition  Outcome: Progressing

## 2023-07-06 NOTE — ASSESSMENT & PLAN NOTE
· Patient with known chest wall mass that has been present for several years  · Reportedly has never been evaluated or biopsied  · Patient adamantly refusing further workup for it at this time -discussed at length that there is concern this could be cancerous.   Patient still refusing

## 2023-07-06 NOTE — ASSESSMENT & PLAN NOTE
· Blood cultures positive x2 on admission for gram-negative rods  · Prelim ID panel Enterobacter  · Continue IV cefepime  · Leukocytosis resolved, procalcitonin downtrending  · Suspect urinary source  · ID following  · Trend WBC and fever curve

## 2023-07-06 NOTE — CONSULTS
Consultation - General Surgery   Mateo Katerine 80 y.o. male MRN: [id number]  Unit/Bed#: -01 Encounter: [id number]    Assessment/Plan      Sepsis POA  -Indicated by leukocytosis, tachycardia, hypotension  -Continue antibiotics for likely treatment of UTI and related bacteremia  -Leukocytosis resolved, procalcitonin downtrending, hypotension improved with resuscitation, mentation has improved  -Continue to monitor WBC, fever curve, vital signs  -Continue supportive care  -Follow cultures    Enterobacter cloacae bacteremia  -Likely related to UTI  -Being followed by infectious disease  -Continue antibiotics per ID  -Follow repeat cultures    UTI with indwelling Acosta catheter  -Urine culture with greater than 100,000 Enterococcus species, greater than 100,000 GNR enteric like  -Acosta catheter replaced  -Follow final cultures  -Urology on board    Cholelithiasis  -Noted on CT scan, follow-up ultrasound indicating wall thickening with trace fluid, negative Vogel sign, no gallbladder distention  -Consulted for possible acute cholecystitis  -Patient with no complaints of right upper quadrant pain, no tenderness on exam, no Vogel's sign, and tolerating diet without issues   -clinically does not appear to have cholecystitis  -No indication for surgical intervention, if continues to be questions concerning cholecystitis can consider HIDA scan, however with patient's advanced age and functional status would not likely be a candidate for cholecystectomy    Transaminitis  -, , ALKP 123, Tbili WNL  -likely related to shock liver with profound hypotension on admission  -Continue to trend    Chest wall mass  -Known history of fungating chest wall mass  -Likely skin cancer  -Patient and family indicate they are not interested in biopsy or further work-up  -Can consider biopsy and follow-up with surgical oncology if patient changes mind      History of Present Illness     HPI:  Mateo Garcias is a 80 y.o. male resident of nursing facility with PMH of CAD, DM, chronic indwelling Acosta catheter who was brought to the emergency department by his daughter for altered mental status. Daughter noted increasing lethargy and the patient. He has recent history of recurrent UTIs treated with recent course of Bactrim. On admission patient noted to have leukocytosis of 20 K and did meet sepsis criteria. CT showed thickened bladder wall with enlarged prostate, and trace pericholecystic fluid. Patient had no complaints of abdominal pain. No history of nausea and vomiting. Has been eating well per daughter. No chest pain, shortness of breath. No known fevers. He does have a known fungating left chest wall mass that has been there for years and has refused biopsy or work-up related to this mass. Consults    Review of Systems   Constitutional: Negative. Negative for appetite change, fever and unexpected weight change. HENT: Negative. Eyes: Negative. Respiratory: Positive for cough. Negative for shortness of breath and wheezing. Cardiovascular: Negative. Negative for chest pain and palpitations. Gastrointestinal: Negative. Negative for abdominal distention, abdominal pain, diarrhea, nausea and vomiting. Endocrine: Negative. Genitourinary: Negative. Negative for hematuria. Dwelling Acosta catheter   Musculoskeletal: Negative. Skin: Positive for wound. Negative for rash. Positive chest wall lesion   Allergic/Immunologic: Negative. Neurological: Negative. Negative for weakness, light-headedness and headaches. Hematological: Negative. Does not bruise/bleed easily. Psychiatric/Behavioral: Negative. All other systems reviewed and are negative.       Historical Information   Past Medical History:   Diagnosis Date   • Coronary artery disease    • Diabetes mellitus ([address])    • Renal disorder      Past Surgical History:   Procedure Laterality Date   • CORONARY ANGIOPLASTY WITH STENT PLACEMENT       Social History   Social History     Substance and Sexual Activity   Alcohol Use Never     Social History     Substance and Sexual Activity   Drug Use Never     E-Cigarette/Vaping   • E-Cigarette Use Never User      E-Cigarette/Vaping Substances   • Nicotine No    • Flavoring No      Social History     Tobacco Use   Smoking Status Former   • Types: Cigarettes   Smokeless Tobacco Never     Family History: non-contributory    Meds/Allergies   all current active meds have been reviewed  Allergies   Allergen Reactions   • Levaquin [Levofloxacin] Confusion       Objective   First Vitals:   Blood Pressure: (!) 77/47 (07/05/23 0828)  Pulse: 93 (07/05/23 0828)  Temperature: 97.8 °F (36.6 °C) (07/05/23 0911)  Temp Source: Oral (07/05/23 0911)  Respirations: 20 (07/05/23 0828)  Height: 5' 8" (172.7 cm) (07/05/23 0828)  Weight - Scale: 62 kg (136 lb 11 oz) (07/05/23 1051)  SpO2: 90 % (07/05/23 0828)    Current Vitals:   Blood Pressure: 153/68 (07/06/23 1451)  Pulse: 62 (07/06/23 1451)  Temperature: 97.9 °F (36.6 °C) (07/06/23 1451)  Temp Source: Oral (07/06/23 1451)  Respirations: 17 (07/06/23 1451)  Height: 5' 8" (172.7 cm) (07/05/23 0828)  Weight - Scale: 62 kg (136 lb 11 oz) (07/05/23 1051)  SpO2: 96 % (07/06/23 1451)      Intake/Output Summary (Last 24 hours) at 7/6/2023 1542  Last data filed at 7/6/2023 0800  Gross per 24 hour   Intake 2780 ml   Output 795 ml   Net 1985 ml       Invasive Devices     Peripheral Intravenous Line  Duration           Peripheral IV 07/05/23 Dorsal (posterior); Right Forearm 1 day    Peripheral IV 07/05/23 Right Antecubital 1 day          Drain  Duration           Urethral Catheter Latex 16 Fr. 1 day                Physical Exam  Constitutional:       General: He is not in acute distress. Appearance: He is well-developed. He is not diaphoretic. Comments: Elderly, frail, thin   HENT:      Head: Normocephalic and atraumatic.       Mouth/Throat:      Pharynx: No oropharyngeal exudate. Eyes:      General: No scleral icterus. Right eye: No discharge. Left eye: No discharge. Neck:      Thyroid: No thyromegaly. Vascular: No JVD. Trachea: No tracheal deviation. Comments: No JVD  Cardiovascular:      Rate and Rhythm: Normal rate and regular rhythm. Heart sounds: Normal heart sounds. No murmur heard. Pulmonary:      Effort: Pulmonary effort is normal. No respiratory distress. Breath sounds: Normal breath sounds. No wheezing. Comments: Decreased breath sounds at bases  Abdominal:      General: Bowel sounds are normal. There is no distension. Palpations: Abdomen is soft. Tenderness: There is no abdominal tenderness. Comments: No right upper quadrant tenderness, no Vogel's sign   Genitourinary:     Comments: Urinary catheter in place with hazy yellow urine  Musculoskeletal:         General: No deformity. Normal range of motion. Cervical back: Normal range of motion and neck supple. Skin:     General: Skin is warm and dry. Findings: No rash. Comments: Multiple keratotic lesions, large pearly protruding lesion from left chest wall with mild drainage   Neurological:      Mental Status: He is alert and oriented to person, place, and time. Comments: No focal deficits   Psychiatric:         Behavior: Behavior normal.         Lab Results:   I have personally reviewed pertinent lab results.   , CBC:   Lab Results   Component Value Date    WBC 7.55 07/06/2023    HGB 8.3 (L) 07/06/2023    HCT 27.0 (L) 07/06/2023    MCV 96 07/06/2023     07/06/2023    RBC 2.81 (L) 07/06/2023    MCH 29.5 07/06/2023    MCHC 30.7 (L) 07/06/2023    RDW 16.5 (H) 07/06/2023    MPV 9.3 07/06/2023    NRBC 0 07/06/2023   , CMP:   Lab Results   Component Value Date    SODIUM 134 (L) 07/06/2023    K 4.5 07/06/2023     07/06/2023    CO2 22 07/06/2023    BUN 18 07/06/2023    CREATININE 0.65 07/06/2023    CALCIUM 8.3 (L) 07/06/2023     (H) 07/06/2023     (H) 07/06/2023    ALKPHOS 123 (H) 07/06/2023    EGFR 83 07/06/2023   , Coagulation: No results found for: "PT", "INR", "APTT", Urinalysis: No results found for: "COLORU", "CLARITYU", "Maryellen Hesselbach", "PHUR", "LEUKOCYTESUR", "NITRITE", "PROTEINUA", "GLUCOSEU", "Frances Padmini", "Neldon Dial", "BLOODU", Amylase: No results found for: "AMYLASE", Lipase: No results found for: "LIPASE"  Imaging: I have personally reviewed pertinent reports. EKG, Pathology, and Other Studies: I have personally reviewed pertinent reports. Counseling / Coordination of Care  Total floor / unit time spent today 30 minutes. Greater than 50% of total time was spent with the patient and / or family counseling and / or coordination of care. A description of the counseling / coordination of care:     Jose Pablo  .

## 2023-07-06 NOTE — ASSESSMENT & PLAN NOTE
· Noted on CT abdomen/pelvis  · Patient denies any back pain  · No lower extremity weakness. Has chronic Acosta catheter  · Discussed with neurosurgery - no interventions. Will obtain baseline XR.   No indication for brace unless patient has pain/radicular sx

## 2023-07-06 NOTE — ASSESSMENT & PLAN NOTE
· Presented to the emergency department due to sudden weakness, lethargy  · Found to be hypotensive in the ED. Met sepsis criteria with HR > 90 and leukocytosis of 20 K suspected due to urinary tract infection  · CT chest/abdomen/pelvis with diffuse bladder wall thickening. Questionable consolidation of the left lung which could be atelectasis. Trace pericholecystic fluid -correlate for acute cholecystitis. Ulcerated irregular left chest wall mass. · Blood pressure improved after IVF resuscitation. Remains afebrile  · Leukocytosis resolved  · Procalcitonin downtrending  · Right upper quadrant ultrasound pending  · Urine culture pending  · Initial blood culture positive x2 for GNR - prelim ID enterobacter recommended IV cefepime. Currently on IV cefepime/Flagyl.   Repeat blood cultures x2 obtained and pending  · ID consulted - appreciate recommendations

## 2023-07-06 NOTE — CONSULTS
Consult Note     Assessment:  Sepsis  Chest wall mass  BPH-thickened urinary bladder wall  Cholelithiasis      Plan:  Continue Acosta catheter, continue Proscar. No urgent urological intervention indicated at present. Advised to change Acosta if is not done already. Will follow-up with urine culture, blood cultures. Medical management per ICU/medicine. Consider surgical oncology consult if patient likes to get his chest wall mass evaluated.  ______________________________________________________________________      Chief Complaint: Lethargy    HPI: Rebekah Castillo,  a 80 y.o. male with known history of CAD, chronic Acsota, diabetes is admitted in the hospital for worsening fatigue. History of recent UTI treated with Bactrim. Patient was found lethargic and was brought to the ER for further evaluation. Patient was found to have leukocytosis of 20.54. CT scan showed left basilar atelectasis and mild thickened urinary bladder wall, enlarged prostate. Also found to have trace cholecystic fluid. Pending blood cultures. Patient started on IV antibiotic, feeling better. Patient denies any fever, chills, chest pain, abdominal pain or abdominal discomfort. No major events. Patient does have a fungating mass on left chest, per patient going on for many years, he did not seek any medical help. And he does not want to get it treated.     Review of Systems:  General: positive for  - fatigue and lethargy  Cardiovascular: no chest pain or dyspnea on exertion  Respiratory: no cough, shortness of breath, or wheezing  Gastrointestinal: no abdominal pain, change in bowel habits, or black or bloody stools  Genitourinary ROS: Review of chronic Acosta  Musculoskeletal ROS: Fungating chronic chest wall mass  Neurological ROS: no TIA or stroke symptoms  Hematological and Lymphatic ROS: negative  Dermatological ROS: negative  Psychological ROS: negative  Ophthalmic ROS: negative  ENT ROS: negative    Past Medical History:  Past Medical History:   Diagnosis Date   • Coronary artery disease    • Diabetes mellitus ([address])    • Renal disorder        Past Surgical History:  Past Surgical History:   Procedure Laterality Date   • CORONARY ANGIOPLASTY WITH STENT PLACEMENT         Social History:  Social History     Substance and Sexual Activity   Alcohol Use Never     Social History     Substance and Sexual Activity   Drug Use Never     Social History     Tobacco Use   Smoking Status Former   • Types: Cigarettes   Smokeless Tobacco Never       Family History:  History reviewed. No pertinent family history. Allergies: Allergies   Allergen Reactions   • Levaquin [Levofloxacin] Confusion       Medications:  Current Facility-Administered Medications   Medication Dose Route Frequency   • acetaminophen (TYLENOL) tablet 650 mg  650 mg Oral Q6H PRN   • atorvastatin (LIPITOR) tablet 20 mg  20 mg Oral Daily With Dinner   • cefepime (MAXIPIME) IVPB (premix in dextrose) 2,000 mg 50 mL  2,000 mg Intravenous Q12H   • enoxaparin (LOVENOX) subcutaneous injection 40 mg  40 mg Subcutaneous Daily   • insulin lispro (HumaLOG) 100 units/mL subcutaneous injection 1-5 Units  1-5 Units Subcutaneous TID AC   • insulin lispro (HumaLOG) 100 units/mL subcutaneous injection 1-5 Units  1-5 Units Subcutaneous HS   • metoprolol succinate (TOPROL-XL) 24 hr tablet 50 mg  50 mg Oral Daily   • metroNIDAZOLE (FLAGYL) IVPB (premix) 500 mg 100 mL  500 mg Intravenous Q8H   • ondansetron (ZOFRAN) injection 4 mg  4 mg Intravenous Q6H PRN   • phenazopyridine (PYRIDIUM) tablet 100 mg  100 mg Oral TID PRN   • sodium chloride 0.9 % infusion  75 mL/hr Intravenous Continuous       Vitals:  BP      Temp     Pulse     Resp      SpO2        I/Os:  I/O last 3 completed shifts: In: 4623 [P.O.:360; I.V.:1020; IV Piggyback:2340]  Out: 1145 [Urine:1145]  No intake/output data recorded.     Lab Results and Cultures:   CBC with diff:   Lab Results   Component Value Date    WBC 7.55 07/06/2023    HGB 8.3 (L) 07/06/2023    HCT 27.0 (L) 07/06/2023    MCV 96 07/06/2023     07/06/2023    RBC 2.81 (L) 07/06/2023    MCH 29.5 07/06/2023    MCHC 30.7 (L) 07/06/2023    RDW 16.5 (H) 07/06/2023    MPV 9.3 07/06/2023    NRBC 0 07/06/2023   ,   BMP/CMP:  Lab Results   Component Value Date    K 4.5 07/06/2023     07/06/2023    CO2 22 07/06/2023    BUN 18 07/06/2023    CREATININE 0.65 07/06/2023    CALCIUM 8.3 (L) 07/06/2023     (H) 07/06/2023     (H) 07/06/2023    ALKPHOS 123 (H) 07/06/2023    EGFR 83 07/06/2023   ,   Lipid Panel: No results found for: "CHOL",   Coags:   Lab Results   Component Value Date    PTT 32 07/05/2023    INR 1.15 07/05/2023   ,     Blood Culture: No results found for: "Ranny [place]",   Urinalysis:   Lab Results   Component Value Date    COLORU Yellow 07/05/2023    CLARITYU Cloudy 07/05/2023    SPECGRAV 1.020 07/05/2023    PHUR 7.5 07/05/2023    LEUKOCYTESUR Large (A) 07/05/2023    NITRITE Negative 07/05/2023    GLUCOSEU Negative 07/05/2023    KETONESU Negative 07/05/2023    BILIRUBINUR Negative 07/05/2023    BLOODU Moderate (A) 07/05/2023   ,   Urine Culture: No results found for: "URINECX",   Wound Culure: No results found for: "WOUNDCULT"      Physical Exam:    General appearance: alert and oriented, in no acute distress, pale and Appears sick  Head: Normocephalic, without obvious abnormality, atraumatic  Eyes: conjunctivae/corneas clear. PERRL, EOM's intact. Fundi benign. Throat: lips, mucosa, and tongue normal; teeth and gums normal  Neck: no adenopathy, no carotid bruit, no JVD, supple, symmetrical, trachea midline and thyroid not enlarged, symmetric, no tenderness/mass/nodules  Back: symmetric, no curvature. ROM normal. No CVA tenderness. Lungs: clear to auscultation bilaterally  Chest wall: Raised, large about 4 x 4 inches, cancerous appearing mass with purulent and foul smell.   Heart: regular rate and rhythm, S1, S2 normal, no murmur, click, rub or gallop  Abdomen: soft, non-tender; bowel sounds normal; no masses,  no organomegaly  Genitalia: Functioning Acosta with clear urine output  Rectal: deferred  Extremities: No edema or cyanosis  Skin: Skin color, texture, turgor normal. No rashes or lesions  Neurologic: Grossly normal      Jono Dunlap PA-C  7/6/2023

## 2023-07-06 NOTE — CONSULTS
Consultation - Infectious Disease   Iwona New 80 y.o. male MRN: [id number]  Unit/Bed#: -01 Encounter: [id number]      Assessment/Plan     Enterobacter cloacae bacteremia/chronic conde/leukocytosis    Patient is a 79 yo male with chronic conde recently on bactrim for unknown UTI, fungating mass anterior chest wall, admitted with lethargy and leukocytosis from gram negative bacteremia. Suspect source is urine, but urine culture is not back yet. Patient with some pericholecystic fluid though and abnormal lft's, though no clinical signs of acute nika. US has been ordered. Suspect lft's are from shock liver, but await US. - continue cefepime and flagyl for now  - monitor for toxicities while on this medications  - await US results  - await urine cultures  - change conde if not done  - follow cbc and fever curve  - patient has refused evaluation of fungating mass of chest wall     D/W primary team     History of Present Illness   Physician Requesting Consult: Sathish Painting DO  Reason for Consult / Principal Problem: bacteremia     HPI: Iwona New is a 80y.o. year old male with CAD, chronic conde, DM. Patient recently rx for UTI with bactrim. Patient yesterday found to be lethargic and was brought into the ER for further evaluation. WBC on admission was 20.54, lft's mildly elevated at 141, alt 379 ast 363. CT of the c/a/p with L basilar atelectasis and marked thickened urinary bladder wall, markedly enlarged prostate. Trace cholecystitic fluid. Blood cultures from admission + for gnr's, PCR + enterobacter, patient initially rx with Rocephin but is now on Cefepime and Flagyl. Patient states he is feeling better, no has, chest pains, abdominal pains, n/v/d. Inpatient consult to Infectious Diseases  Consult performed by: Mili Clinton MD  Consult ordered by: Ana Branham PA-C          ROS: 12 systems reviewed, remainder is neg.     Historical Information   Past Medical History:   Diagnosis Date   • Coronary artery disease    • Diabetes mellitus ([address])    • Renal disorder      Past Surgical History:   Procedure Laterality Date   • CORONARY ANGIOPLASTY WITH STENT PLACEMENT       Social History   Social History     Substance and Sexual Activity   Alcohol Use Never     Social History     Substance and Sexual Activity   Drug Use Never     Social History     Tobacco Use   Smoking Status Former   • Types: Cigarettes   Smokeless Tobacco Never     History reviewed. No pertinent family history.     Meds/Allergies   MEDS: reviewed       Current Facility-Administered Medications:   •  acetaminophen (TYLENOL) tablet 650 mg, 650 mg, Oral, Q6H PRN, Adelfo S Prechtel, DO  •  atorvastatin (LIPITOR) tablet 20 mg, 20 mg, Oral, Daily With Dinner, Adelfo S Prechtel, DO, 20 mg at 07/05/23 1655  •  cefepime (MAXIPIME) IVPB (premix in dextrose) 2,000 mg 50 mL, 2,000 mg, Intravenous, Q12H, Adelfo S Prechtel, DO, Last Rate: 100 mL/hr at 07/06/23 0716, 2,000 mg at 07/06/23 0716  •  enoxaparin (LOVENOX) subcutaneous injection 40 mg, 40 mg, Subcutaneous, Daily, Adelfo S Prechtel, DO, 40 mg at 07/06/23 0844  •  insulin lispro (HumaLOG) 100 units/mL subcutaneous injection 1-5 Units, 1-5 Units, Subcutaneous, TID AC **AND** Fingerstick Glucose (POCT), , , TID AC, KAREEM Patel  •  insulin lispro (HumaLOG) 100 units/mL subcutaneous injection 1-5 Units, 1-5 Units, Subcutaneous, HS, KAREEM Patel, 2 Units at 07/05/23 2135  •  metoprolol succinate (TOPROL-XL) 24 hr tablet 50 mg, 50 mg, Oral, Daily, Adelfo S Prechtel, DO, 50 mg at 07/06/23 2888  •  metroNIDAZOLE (FLAGYL) IVPB (premix) 500 mg 100 mL, 500 mg, Intravenous, Q8H, [place]  [facility], DO, Stopped at 07/06/23 0502  •  ondansetron (ZOFRAN) injection 4 mg, 4 mg, Intravenous, Q6H PRN, Kaur Pac S Prechtel, DO  •  phenazopyridine (PYRIDIUM) tablet 100 mg, 100 mg, Oral, TID PRN, Kaur TRISTAN Prechtel, DO  •  sodium chloride 0.9 % infusion, 75 mL/hr, Intravenous, Continuous, KAREEM Morgan, Last Rate: 75 mL/hr at 07/1934, 75 mL/hr at 07/1934    Allergies   Allergen Reactions   • Levaquin [Levofloxacin] Confusion         Intake/Output Summary (Last 24 hours) at 7/6/2023 0150  Last data filed at 7/6/2023 0502  Gross per 24 hour   Intake 3720 ml   Output 1145 ml   Net 2575 ml       PE:  GEN: NAD  VSS, Tmax 98.0  HEENT: anicteric  NECK: supple, no adenopathy  CARDIAC: rrr s1s2  LUNGS: cta bilaterally  ABDOMEN: soft nt/nd + BS  EXTREMITIES: no edema  SKIN: + fungating mass anterior chest wall  NEURO: grossly non-focal   : + conde catheter  PSYCH: nl affect    Invasive Devices:   Peripheral IV 07/05/23 Right Antecubital (Active)   Site Assessment WDL 07/06/23 0400   Dressing Type Transparent 07/06/23 0400   Line Status Flushed & Clamped 07/06/23 0400   Dressing Status Clean;Dry; Intact 07/06/23 0400       Peripheral IV 07/05/23 Dorsal (posterior); Right Forearm (Active)   Site Assessment WDL 07/06/23 0400   Dressing Type Transparent 07/06/23 0400   Line Status Infusing 07/06/23 0400   Dressing Status Clean;Dry; Intact 07/06/23 0400       Urethral Catheter Latex 16 Fr. (Active)   Reasons to continue Urinary Catheter  Chronic urinary catheter 07/06/23 0400   Goal for Removal N/A- chronic conde 07/06/23 0400   Site Assessment Patent; Red 07/06/23 0400   Conde Care Done 07/06/23 0833   Collection Container Standard drainage bag 07/06/23 0400   Securement Method Securing device (Describe) 07/06/23 0400   Output (mL) 280 mL 07/06/23 0400           Lab Results:   Admission on 07/05/2023   Component Date Value   • POC Glucose 07/05/2023 226 (H)    • WBC 07/05/2023 20.54 (H)    • RBC 07/05/2023 3.20 (L)    • Hemoglobin 07/05/2023 9.3 (L)    • Hematocrit 07/05/2023 30.2 (L)    • MCV 07/05/2023 94    • MCH 07/05/2023 29.1    • MCHC 07/05/2023 30.8 (L)    • RDW 07/05/2023 16.2 (H)    • MPV 07/05/2023 9.4    • Platelets 77/09/8320 236    • nRBC 07/05/2023 0    • Neutrophils Relative 07/05/2023 88 (H)    • Immat GRANS % 07/05/2023 1    • Lymphocytes Relative 07/05/2023 8 (L)    • Monocytes Relative 07/05/2023 3 (L)    • Eosinophils Relative 07/05/2023 0    • Basophils Relative 07/05/2023 0    • Neutrophils Absolute 07/05/2023 17.95 (H)    • Immature Grans Absolute 07/05/2023 0.14    • Lymphocytes Absolute 07/05/2023 1.69    • Monocytes Absolute 07/05/2023 0.68    • Eosinophils Absolute 07/05/2023 0.01    • Basophils Absolute 07/05/2023 0.07    • Sodium 07/05/2023 129 (L)    • Potassium 07/05/2023 4.9    • Chloride 07/05/2023 99    • CO2 07/05/2023 23    • ANION GAP 07/05/2023 7    • BUN 07/05/2023 20    • Creatinine 07/05/2023 1.22    • Glucose 07/05/2023 201 (H)    • Calcium 07/05/2023 8.8    • Corrected Calcium 07/05/2023 9.6    • AST 07/05/2023 363 (H)    • ALT 07/05/2023 379 (H)    • Alkaline Phosphatase 07/05/2023 141 (H)    • Total Protein 07/05/2023 6.2 (L)    • Albumin 07/05/2023 3.0 (L)    • Total Bilirubin 07/05/2023 0.61    • eGFR 07/05/2023 50    • LACTIC ACID 07/05/2023 1.8    • Procalcitonin 07/05/2023 4.96 (H)    • Protime 07/05/2023 15.5 (H)    • INR 07/05/2023 1.15    • PTT 07/05/2023 32    • Gram Stain Result 07/05/2023 Gram negative rods (A)    • Gram Stain Result 07/05/2023 Gram negative rods (A)    • Color, UA 07/05/2023 Yellow    • Clarity, UA 07/05/2023 Cloudy    • Specific Gravity, UA 07/05/2023 1.020    • pH, UA 07/05/2023 7.5    • Leukocytes, UA 07/05/2023 Large (A)    • Nitrite, UA 07/05/2023 Negative    • Protein, UA 07/05/2023 30 (1+) (A)    • Glucose, UA 07/05/2023 Negative    • Ketones, UA 07/05/2023 Negative    • Urobilinogen, UA 07/05/2023 <2.0    • Bilirubin, UA 07/05/2023 Negative    • Occult Blood, UA 07/05/2023 Moderate (A)    • Lipase 07/05/2023 178 (H)    • hs TnI 0hr 07/05/2023 28    • Ventricular Rate 07/05/2023 91    • Atrial Rate 07/05/2023 94    • QRSD Interval 07/05/2023 138    • QT Interval 07/05/2023 386    • QTC Interval 07/05/2023 474    • QRS Axis 07/05/2023 -78    • T Wave Axis 07/05/2023 67    • hs TnI 2hr 07/05/2023 102 (H)    • Delta 2hr hsTnI 07/05/2023 74 (H)    • hs TnI 4hr 07/05/2023 178 (H)    • Delta 4hr hsTnI 07/05/2023 150 (H)    • RBC, UA 07/05/2023 None Seen    • WBC, UA 07/05/2023 Innumerable (A)    • Epithelial Cells 07/05/2023 None Seen    • Bacteria, UA 07/05/2023 Innumerable (A)    • MUCUS THREADS 07/05/2023 None Seen    • Ca Oxalate Bhumi, UA 07/05/2023 Occasional (A)    • LACTIC ACID 07/05/2023 1.5    • Procalcitonin 07/05/2023 3.21 (H)    • Platelets 39/72/0654 173    • MPV 07/05/2023 9.3    • POC Glucose 07/05/2023 244 (H)    • POC Glucose 07/05/2023 262 (H)    • Enterobacter cloacae com* 07/05/2023 Detected (A)    • WBC 07/06/2023 7.55    • RBC 07/06/2023 2.81 (L)    • Hemoglobin 07/06/2023 8.3 (L)    • Hematocrit 07/06/2023 27.0 (L)    • MCV 07/06/2023 96    • MCH 07/06/2023 29.5    • MCHC 07/06/2023 30.7 (L)    • RDW 07/06/2023 16.5 (H)    • MPV 07/06/2023 9.3    • Platelets 90/81/5814 156    • nRBC 07/06/2023 0    • Neutrophils Relative 07/06/2023 83 (H)    • Immat GRANS % 07/06/2023 1    • Lymphocytes Relative 07/06/2023 11 (L)    • Monocytes Relative 07/06/2023 5    • Eosinophils Relative 07/06/2023 0    • Basophils Relative 07/06/2023 0    • Neutrophils Absolute 07/06/2023 6.29    • Immature Grans Absolute 07/06/2023 0.04    • Lymphocytes Absolute 07/06/2023 0.79    • Monocytes Absolute 07/06/2023 0.40    • Eosinophils Absolute 07/06/2023 0.01    • Basophils Absolute 07/06/2023 0.02    • Sodium 07/06/2023 134 (L)    • Potassium 07/06/2023 4.5    • Chloride 07/06/2023 108    • CO2 07/06/2023 22    • ANION GAP 07/06/2023 4    • BUN 07/06/2023 18    • Creatinine 07/06/2023 0.65    • Glucose 07/06/2023 109    • Calcium 07/06/2023 8.3 (L)    • Corrected Calcium 07/06/2023 9.3    • AST 07/06/2023 138 (H)    • ALT 07/06/2023 281 (H)    • Alkaline Phosphatase 07/06/2023 123 (H)    • Total Protein 07/06/2023 5.7 (L)    • Albumin 07/06/2023 2.7 (L)    • Total Bilirubin 07/06/2023 0.35    • eGFR 07/06/2023 83    • Magnesium 07/06/2023 1.9    • Procalcitonin 07/06/2023 1.98 (H)    • POC Glucose 07/06/2023 113      Imaging Studies: I have personally reviewed pertinent films in PACS  EKG, Pathology, and Other Studies: I have personally reviewed pertinent reports.       Culture  No results found for: "Samuel Righter"  No results found for: "WOUNDCULT"  No results found for: "Fernandez Courtney"  No results found for: "SPUTUMCULTUR"    Principal Problem:    Sepsis ([address])  Active Problems:    Elevated troponin

## 2023-07-06 NOTE — PHYSICAL THERAPY NOTE
Tommy Herzog is a 80 y.o. Male who presents to [address] [place] [address] on 7/5/2023 from home w/ c/o AMS and diagnosis of sepsis, UTI. Orders for PT eval and treat received. Pt presents w/ comorbidities of chest wall mass. At baseline, pt mobilizes S to A x 1 w/ RW vs WC, and reports 0 falls in the last 6 months. Upon evaluation, pt presents w/ the following deficits: weakness, impaired balance and decreased endurance. Upon eval, pt requires *** for bed mobility, *** for transfers, and *** for gait. Based on this PT evaluation today, [facility] During this admission, pt would benefit from continued skilled inpatient PT in the acute care setting in order to address the abovementioned deficits to maximize function and mobility before DC from acute care.      Patient will: Perform all bed mobility tasks {rjklevelofA:94614} to improve pt's independence w/ repositioning for decrease risk of skin breakdown, Perform all transfers {rjklevelofA:19160} consistently from various height surfaces in order to improve I w/ engagement w/ real-world environments/situations and Ambulate at least *** ft. {rjkassistivedevice:27722} {rjklevelofA:36116} w/o LOB to facilitate return and engagement w/ previous living environment

## 2023-07-06 NOTE — ASSESSMENT & PLAN NOTE
·   alk phos 123 normal T. Bili  · May be related to profound hypotension present on admission now resolved  · Right upper quadrant ultrasound pending  ·

## 2023-07-06 NOTE — TELEMEDICINE
e-Consult ([facility])     Inpatient consult to Neurosurgery  Consult performed by: Irene Wallis PA-C  Consult ordered by: Nick Taylor PA-C         Contacted by Junie Siemens, PA-C. Mateo Garcias 80 y.o. male MRN: [id number]  Unit/Bed#: -01 Encounter: [id number]    Reason for Consult  Per provider report, patient presented to [facility] with AMS, lethargy, and generalized weakness on 7/5. Noted to be hypotensive on arrival. Diagnosed with severe sepsis, likely secondary to a UTI. Pt with hx of chronic indwelling conde. BP improved with IVF resuscitation. Underwent CT c/a/p as part of sepsis workup. Noted to have an incidental L3 vertebral body compression fracture. nsgy was consulted for review. Pt reportedly denies any back pain, radicular pain, weakness, or numbness. Has a chronic conde. No reported bowel incontinence. Available past medical history,social history, surgical history, medication list, drug allergies and review of systems were reviewed. /68 (BP Location: Left arm)   Pulse 62   Temp 97.9 °F (36.6 °C) (Oral)   Resp 17   Ht 5' 8" (1.727 m)   Wt 62 kg (136 lb 11 oz)   SpO2 96%   BMI 20.78 kg/m²      Clinical exam:   (per provider report)   - neurologically intact  - strength intact to aileen UE and aileen LE, moving all 4 extremities equally   - no point tenderness appreciated over the T/L spine     Imaging:   · 7/5 CT c/a/p: Fracture of the L3 vertebra with about 50% loss of the vertebral height with sclerosis, likely due to osteoporotic compression    Assessment and Recommendations  · Continue to closely monitor neuro exam  · Frequent neurochecks per primary team  · Maintain normotensive BP goals, MAP > 65  · No acute neurosurgical intervention indicated at this time.   · Case and imaging reviewed   · Fracture noted incidentally and patient without pain or symptoms, neurologically intact  · No indication to transfer to B  · No indication for bracing given patient without symptoms or pain  · And LSO brace may be worn for comfort as needed  · Obtain baseline upright x-rays  · No reported evidence of trauma, this fracture is likely osteoporotic  · Recommend close outpatient follow-up with PCP for work-up and treatment of osteoporosis to prevent further fractures  · Pain control, as needed, per primary team  · Continue medical management per primary team    Neurosurgery will follow from the periphery and review upright x-rays once completed. Please reach out with any further questions or concerns. All questions answered. Provider is in agreement with the course of action. 5-10 minutes, >50% of the total time devoted to medical consultative verbal/EMR discussion between providers. Written report will be generated in the EMR.

## 2023-07-06 NOTE — ASSESSMENT & PLAN NOTE
· Presented with increased lethargy  · CT head negative for acute intracranial abnormalities  · Likely related to sepsis and hypotension  · Mentation greatly improved this morning, awake and alert  · Continue supportive care and treatment of metabolic abnormalities

## 2023-07-06 NOTE — CONSULTS
See urology consult dictated 10:05 today by provider Gloria Pabon PA-C. This note is used simply to link orders.

## 2023-07-06 NOTE — PLAN OF CARE
Problem: MOBILITY - ADULT  Goal: Maintain or return to baseline ADL function  Description: INTERVENTIONS:  -  Assess patient's ability to carry out ADLs; assess patient's baseline for ADL function and identify physical deficits which impact ability to perform ADLs (bathing, care of mouth/teeth, toileting, grooming, dressing, etc.)  - Assess/evaluate cause of self-care deficits   - Assess range of motion  - Assess patient's mobility; develop plan if impaired  - Assess patient's need for assistive devices and provide as appropriate  - Encourage maximum independence but intervene and supervise when necessary  - Involve family in performance of ADLs  - Assess for home care needs following discharge   - Consider OT consult to assist with ADL evaluation and planning for discharge  - Provide patient education as appropriate  Outcome: Progressing    Problem: PAIN - ADULT  Goal: Verbalizes/displays adequate comfort level or baseline comfort level  Description: Interventions:  - Encourage patient to monitor pain and request assistance  - Assess pain using appropriate pain scale  - Administer analgesics based on type and severity of pain and evaluate response  - Implement non-pharmacological measures as appropriate and evaluate response  - Consider cultural and social influences on pain and pain management  - Notify physician/advanced practitioner if interventions unsuccessful or patient reports new pain  Outcome: Progressing     Problem: INFECTION - ADULT  Goal: Absence or prevention of progression during hospitalization  Description: INTERVENTIONS:  - Assess and monitor for signs and symptoms of infection  - Monitor lab/diagnostic results  - Monitor all insertion sites, i.e. indwelling lines, tubes, and drains  - Monitor endotracheal if appropriate and nasal secretions for changes in amount and color  - [place] appropriate cooling/warming therapies per order  - Administer medications as ordered  - Instruct and encourage patient and family to use good hand hygiene technique  - Identify and instruct in appropriate isolation precautions for identified infection/condition  Outcome: Progressing     Problem: RESPIRATORY - ADULT  Goal: Achieves optimal ventilation and oxygenation  Description: INTERVENTIONS:  - Assess for changes in respiratory status  - Assess for changes in mentation and behavior  - Position to facilitate oxygenation and minimize respiratory effort  - Oxygen administered by appropriate delivery if ordered  - Initiate smoking cessation education as indicated  - Encourage broncho-pulmonary hygiene including cough, deep breathe, Incentive Spirometry  - Assess the need for suctioning and aspirate as needed  - Assess and instruct to report SOB or any respiratory difficulty  - Respiratory Therapy support as indicated  Outcome: Progressing no

## 2023-07-06 NOTE — ASSESSMENT & PLAN NOTE
· With chronic Conde catheter  · Urology consulted given CT findings and concern for penile wound related to chronic conde  · UA innumerable WBC, innumerable bacteria  · Urine culture pending  · Continue IV cefepime

## 2023-07-06 NOTE — ASSESSMENT & PLAN NOTE
· Sodium up to 134 on admission  · Initial hyponatremia likely related to hypovolemia  · Continue gentle IVF  · Trend BMP

## 2023-07-06 NOTE — PHYSICAL THERAPY NOTE
PHYSICAL THERAPY EVALUATION NOTE    Patient Name: Tommy Herzog  PMDYV'R Date: 2023    AGE:   80 y.o. Mrn:   [id number]  ADMIT DX:  Altered mental status [R41.82]  Acute encephalopathy [G93.40]  Septic shock ([address]) [A41.9, R65.21]  Mass of chest wall, left [R22.2]    Past Medical History:   Diagnosis Date    Coronary artery disease     Diabetes mellitus ([address])     Renal disorder      Length Of Stay: 1  PHYSICAL THERAPY EVALUATION :   Patient's identity confirmed via 2 patient identifiers (full name and ) at start of session       23 1510   PT Last Visit   PT Visit Date 23   Note Type   Note type Evaluation   Additional Comments Pt goes by Bailey   Pain Assessment   Pain Assessment Tool 0-10   Pain Score No Pain   Restrictions/Precautions   Weight Bearing Precautions Per Order No   Other Precautions Cognitive; Chair Alarm; Bed Alarm; Fall Risk;Multiple lines   Home Living   Type of [address] [place] [address] One level  (1/2 CELINE)   Bathroom Shower/Tub Walk-in shower   Bathroom Toilet Raised   Bathroom Equipment Shower chair; Toilet raiser   Bathroom Accessibility Accessible via wheelchair;Accessible via [address]; Wheelchair-manual   Additional Comments Per wife, pt ambulates very short distances in the home, most times will use WC instead. Requires A x 1 at baseline   Prior Function   Level of [place] Needs assistance with ADLs; Needs assistance with functional mobility   Lives With Spouse   Receives Help From Family  (pt's KATIE lives next door)   IADLs Family/Friend/Other provides transportation; Family/Friend/Other provides medication management; Family/Friend/Other provides meals   Falls in the last 6 months 0  (reports significant balance issues but no falls)   Vocational Retired   General   Family/Caregiver Present Yes  (Wife, Kimberly)   Cognition   Overall Cognitive Status Impaired Arousal/Participation Responsive   Orientation Level Oriented X4   Following Commands Follows one step commands with increased time or repetition   Comments Pt [place], quiet. Wife does most of talking for him   RLE Assessment   RLE Assessment WFL   Strength RLE   RLE Overall Strength 3+/5   LLE Assessment   LLE Assessment WFL   Strength LLE   LLE Overall Strength 3+/5   Bed Mobility   Supine to Sit 3  Moderate assistance   Additional items Assist x 1; Increased time required;Verbal cues; Bedrails;HOB elevated   Sit to Supine Unable to assess   Transfers   Sit to Stand 3  Moderate assistance   Additional items Assist x 1; Increased time required;Verbal cues   Stand to Sit 3  Moderate assistance   Additional items Assist x 2; Increased time required;Verbal cues   Stand pivot 3  Moderate assistance   Additional items Assist x 2; Increased time required;Verbal cues  (w/ RW)   Additional Comments Pt retropulsive upon standing. Initiated SPT to recliner chair. Pt's wife states pt is "not good" at pivots. Pt attempting to try to sit without being at chair yet, benefits from mod A x 2 to safely complete SPT   Ambulation/Elevation   Ambulation/Elevation Additional Comments see treatment note below for mobility   Balance   Static Sitting Fair   Static Standing Poor   Ambulatory Poor   Activity Tolerance   Activity Tolerance Patient tolerated treatment well   Medical Staff Made Aware OT Rodri Pink, MAHIN [place]   Assessment   Problem List Decreased strength;Decreased endurance; Impaired balance;Decreased mobility; Decreased cognition;Decreased skin integrity   Assessment Kaushal Mcguire is a 80 y.o. Male who presents to [facility] on [facility]/5/[facility]23 from home w/ c/o AMS and diagnosis of sepsis, UTI. Orders for PT eval and treat received. Pt presents w/ comorbidities of chest wall mass. At baseline, pt mobilizes S to A x 1 w/ RW vs WC, and reports 0 falls in the last 6 months.  Upon evaluation, pt presents w/ the following deficits: weakness, impaired balance and decreased endurance. Upon eval, pt requires mod A x 1 for bed mobility, mod A x 1 for transfers, and mod A x 1 for gait. Based on this PT evaluation today, patient's discharge recommendation is for Level III. Given the above findings from this evaluation, at this time this patient does not require skilled inpatient PT for the remainder of this admission. Pt appears at baseline mobility and wife verbalizes no additional skilled PT needs upon DC. Will D/C patient from PT caseload, please reconsult if any changes or needs arise. Goals   Patient Goals pt's wife reports wanting patient to stand and to take him home upon DC   Recommendation   UB Rehab Discharge Recommendation (PT/OT) Level 3   Equipment Recommended Walker; Wheelchair   Additional Comments Pt's wife declining all skilled PT services upon DC (CM present when this was discussed). Appears pt is at/near his mobility baseline. Recommend OOB to recliner chair w/ RN staff for all meals and toileting for duration of admission   AM-PAC Basic Mobility Inpatient   Turning in Flat Bed Without Bedrails 2   Lying on Back to Sitting on Edge of Flat Bed Without Bedrails 2   Moving Bed to Chair 2   Standing Up From Chair Using Arms 2   Walk in Room 2   Climb 3-5 Stairs With Railing 1   Basic Mobility Inpatient Raw Score 11   Basic Mobility Standardized Score 30.25   Highest Level Of Mobility   -HLM Goal 4: Move to chair/commode   JH-HLM Achieved 6: Walk 10 steps or more   Additional Treatment Session   Start Time 1528   End Time 1536   Treatment Assessment Pt seated OOB in recliner chair, requires mod A x 1 for STS. Pt is able to ambulate 10 ft w / RW w/ mod A x 1 and chair follow. Pt's wife comments when he walks he often has a WC follow. Wife unable to verbalize when ambulatory how far he will walk (reports he "doesn't use the bathroom" so he doesn't get up to use the bathroom"). Pt returned to recliner chair at end of session.  Verbalized with wife that pt is appropriate for mobilization OOB w/ RN staff for remainder of admission   Equipment Use RW   Additional Treatment Day 1   End of Consult   Patient Position at End of Consult Bedside chair;Bed/Chair alarm activated; All needs within reach         The patient's AM-PAC Basic Mobility Inpatient Short Form Raw Score is 11, Standardized Score is 30.25. A standardized score less than 38.32 (raw score of 16) suggests the patient may benefit from discharge to post-acute rehabilitation services which may not coincide with above PT recommendations. However please refer to therapist recommendation for discharge planning given other factors that may influence destination. Given the above findings from this evaluation, at this time this patient does not require skilled inpatient PT for the remainder of this admission. Will D/C patient from PT caseload, please reconsult if any changes or needs arise.       Colleen Jensen, PT, DPT

## 2023-07-06 NOTE — OCCUPATIONAL THERAPY NOTE
Occupational Therapy Evaluation     Patient Name: Andrei Au  PEOQX'X Date: 7/6/2023  Problem List  Principal Problem:    Sepsis ([address])  Active Problems:    Elevated troponin    Urinary tract infection    Transaminitis    Chest wall mass    Hyponatremia    L3 vertebral fracture (HCC)    Acute metabolic encephalopathy    Gram-negative bacteremia    Past Medical History  Past Medical History:   Diagnosis Date    Coronary artery disease     Diabetes mellitus ([address])     Renal disorder      Past Surgical History  Past Surgical History:   Procedure Laterality Date    CORONARY ANGIOPLASTY WITH STENT PLACEMENT           07/06/23 1535   OT Last Visit   OT Visit Date 07/06/23   Note Type   Note type Evaluation   Pain Assessment   Pain Assessment Tool 0-10   Pain Score No Pain   Restrictions/Precautions   Weight Bearing Precautions Per Order No   Other Precautions Fall Risk;Cognitive; Chair Alarm; Bed Alarm;Multiple lines;Telemetry   Home Living   Type of [address] [place] [address] One level  (1 CELINE)   Bathroom Shower/Tub Walk-in shower   Bathroom Toilet Raised   Bathroom Equipment Grab bars in shower; Shower chair; Toilet raiser  (toilet frame)   Bathroom Accessibility Accessible via wheelchair   [place]; Wheelchair-manual   Additional Comments Per wife, pt able to ambulate short distances with 1 person assist and following with wheelchair. However, some days just pivots to wheelchair. Reports that pt does not do steps- is bumped up/down step in wheelchair. Prior Function   Level of [place] Needs assistance with ADLs; Needs assistance with IADLS;Needs assistance with functional mobility   Lives With Spouse   Receives Help From Family   IADLs Family/Friend/Other provides transportation; Family/Friend/Other provides meals; Family/Friend/Other provides medication management   Falls in the last 6 months 0   Subjective   Subjective Pt received in supine posiotion. Pt agreeable to session.  Wife states "[facility], we can get him out of bed!"   ADL   Eating Assistance 5  Supervision/Setup   Grooming Assistance 5  Supervision/Setup   UB Bathing Assistance 3  Moderate Assistance   LB Bathing Assistance 2  Maximal [place] 3  Moderate Assistance   LB Dressing Assistance 2  Maximal [address]  2  Maximal Assistance   Additional Comments *ADL mgmt based on clinical judgement*   Bed Mobility   Supine to Sit 3  Moderate assistance   Additional items Assist x 1;Verbal cues; Increased time required   Additional Comments Pt remained seated in recliner by end of session. Transfers   Sit to Stand 3  Moderate assistance   Additional items Assist x 1;Verbal cues   Stand to Sit 3  Moderate assistance   Additional items Assist x 2;Verbal cues   Stand pivot 3  Moderate assistance   Additional items Assist x 2;Verbal cues   Additional Comments Pt very retropulsive. Wife at bedside reports that at baseline pivots poorly. Functional Mobility   Functional Mobility 3  Moderate assistance   Additional Comments x1 + 1 for chair follow   Balance   Static Sitting Fair -   Dynamic Sitting Poor +   Static Standing Poor   Dynamic Standing Poor -   Activity Tolerance   Activity Tolerance Patient tolerated treatment well   Medical Staff Made Aware PT Karolina   Nurse Made Aware RN   RUE Assessment   RUE Assessment WFL   LUE Assessment   LUE Assessment WFL   Cognition   Overall Cognitive Status Impaired   Arousal/Participation Alert   Attention Attends with cues to redirect   Orientation Level Oriented to person;Oriented to place   Memory Decreased recall of precautions;Decreased recall of recent events;Decreased short term memory   Following Commands Follows one step commands with increased time or repetition   Assessment   Limitation Decreased ADL status; Decreased self-care trans;Decreased high-level ADLs; Decreased cognition   Assessment Pt is a 80 y.o. male seen for OT evaluation at [address] [facility] [place], admitted 7/5/2023 w/ Sepsis ([address] Comorbidities affecting pt's functional performance at time of assessment include: UTI, chest wall mass, acute metabolic encephalopathy, etc (see chart). Personal factors affecting pt at time of IE include:difficulty performing ADLS, difficulty performing IADLS  and decreased functional mobility. Prior to admission, pt was living with wife in [address] [place] [address] with 1 CELINE which was typically navigated by family bumping pt with a wheelchair. Pt required assist with ADLs and IADLS& required wheelchair  and RW PTA. Upon evaluation, pt appears to be at/close to functional baseline. Pt's wife at bedside adamantlyy refusing need for therapy services. No further OT needs indicated at this time. This evaluation required an extensive review of medical and/or therapy records and additional review of physical, cognitive and psychosocial history related to functional performance. Based upon functional performance deficits and assessments, this evaluation has been identified as a high complexity evaluation. The patient's raw score on the -PAC Daily Activity inpatient short form is 11, standardized score is 29.04, less than 39.4. Patients at this level are likely to benefit from DC to post-acute rehabilitation services. However please refer to therapist recommendation for discharge planning given other factors that may influence destination. At this time, OT recommendations at time of discharge are level 4 resources. At baseline, pt requires assist with ADLs and mobility. Pt's wife denies need for therapy services upon D/C. Encourage mobility with nursing. Goals   Patient Goals Wife states "He's not going to rehab. He's going home"   Plan   OT Frequency Eval only  (Pt appears to be at functional baseline)   Recommendation   UB Rehab Discharge Recommendation (PT/OT) Level 4  (Pt's wife denies need for therapy services upon D/C.  Recommend continued 24/7 social support from family.)   -[facility] Daily Activity Inpatient   Lower Body Dressing 1   Bathing 1   Toileting 1   Upper Body Dressing 2   Grooming 3   Eating 3   Daily Activity Raw Score 11   Daily Activity Standardized Score (Calc for Raw Score >=11) 29.04   AM-PAC Applied Cognition Inpatient   Following a Speech/Presentation 2   Understanding Ordinary Conversation 3   Taking Medications 1   Remembering Where Things Are Placed or Put Away 1   Remembering List of 4-5 Errands 1   Taking Care of Complicated Tasks 1   Applied Cognition Raw Score 9   Applied Cognition Standardized Score 22.48   End of Consult   Education Provided Yes;Family or social support of family present for education by provider   Patient Position at End of Consult Bedside chair;Bed/Chair alarm activated; All needs within reach   Nurse Communication Nurse aware of consult           Jagruti Santos OTR/L

## 2023-07-06 NOTE — PLAN OF CARE
Problem: MOBILITY - ADULT  Goal: Maintain or return to baseline ADL function  Description: INTERVENTIONS:  -  Assess patient's ability to carry out ADLs; assess patient's baseline for ADL function and identify physical deficits which impact ability to perform ADLs (bathing, care of mouth/teeth, toileting, grooming, dressing, etc.)  - Assess/evaluate cause of self-care deficits   - Assess range of motion  - Assess patient's mobility; develop plan if impaired  - Assess patient's need for assistive devices and provide as appropriate  - Encourage maximum independence but intervene and supervise when necessary  - Involve family in performance of ADLs  - Assess for home care needs following discharge   - Consider OT consult to assist with ADL evaluation and planning for discharge  - Provide patient education as appropriate  Outcome: Progressing  Goal: Maintains/Returns to pre admission functional level  Description: INTERVENTIONS:  - Perform BMAT or MOVE assessment daily.   - Set and communicate daily mobility goal to care team and patient/family/caregiver.    - Collaborate with rehabilitation services on mobility goals if consulted  - Out of bed for toileting  - Record patient progress and toleration of activity level   Outcome: Progressing     Problem: Prexisting or High Potential for Compromised Skin Integrity  Goal: Skin integrity is maintained or improved  Description: INTERVENTIONS:  - Identify patients at risk for skin breakdown  - Assess and monitor skin integrity  - Assess and monitor nutrition and hydration status  - Monitor labs   - Assess for incontinence   - Turn and reposition patient  - Assist with mobility/ambulation  - Relieve pressure over bony prominences  - Avoid friction and shearing  - Provide appropriate hygiene as needed including keeping skin clean and dry  - Evaluate need for skin moisturizer/barrier cream  - Collaborate with interdisciplinary team   - Patient/family teaching  - Consider wound care consult   Outcome: Progressing     Problem: PAIN - ADULT  Goal: Verbalizes/displays adequate comfort level or baseline comfort level  Description: Interventions:  - Encourage patient to monitor pain and request assistance  - Assess pain using appropriate pain scale  - Administer analgesics based on type and severity of pain and evaluate response  - Implement non-pharmacological measures as appropriate and evaluate response  - Consider cultural and social influences on pain and pain management  - Notify physician/advanced practitioner if interventions unsuccessful or patient reports new pain  Outcome: Progressing     Problem: INFECTION - ADULT  Goal: Absence or prevention of progression during hospitalization  Description: INTERVENTIONS:  - Assess and monitor for signs and symptoms of infection  - Monitor lab/diagnostic results  - Monitor all insertion sites, i.e. indwelling lines, tubes, and drains  - Monitor endotracheal if appropriate and nasal secretions for changes in amount and color  - Lenora appropriate cooling/warming therapies per order  - Administer medications as ordered  - Instruct and encourage patient and family to use good hand hygiene technique  - Identify and instruct in appropriate isolation precautions for identified infection/condition  Outcome: Progressing     Problem: SAFETY ADULT  Goal: Patient will remain free of falls  Description: INTERVENTIONS:  - Educate patient/family on patient safety including physical limitations  - Instruct patient to call for assistance with activity   - Consult OT/PT to assist with strengthening/mobility   - Keep Call bell within reach  - Keep bed low and locked with side rails adjusted as appropriate  - Keep care items and personal belongings within reach  - Initiate and maintain comfort rounds  - Make Fall Risk Sign visible to staff  - Apply yellow socks and bracelet for high fall risk patients  - Consider moving patient to room near nurses station  Outcome: Progressing     Problem: CARDIOVASCULAR - ADULT  Goal: Maintains optimal cardiac output and hemodynamic stability  Description: INTERVENTIONS:  - Monitor I/O, vital signs and rhythm  - Monitor for S/S and trends of decreased cardiac output  - Administer and titrate ordered vasoactive medications to optimize hemodynamic stability  - Assess quality of pulses, skin color and temperature  - Assess for signs of decreased coronary artery perfusion  - Instruct patient to report change in severity of symptoms  Outcome: Progressing     Problem: RESPIRATORY - ADULT  Goal: Achieves optimal ventilation and oxygenation  Description: INTERVENTIONS:  - Assess for changes in respiratory status  - Assess for changes in mentation and behavior  - Position to facilitate oxygenation and minimize respiratory effort  - Oxygen administered by appropriate delivery if ordered  - Initiate smoking cessation education as indicated  - Encourage broncho-pulmonary hygiene including cough, deep breathe, Incentive Spirometry  - Assess the need for suctioning and aspirate as needed  - Assess and instruct to report SOB or any respiratory difficulty  - Respiratory Therapy support as indicated  Outcome: Progressing     Problem: GENITOURINARY - ADULT  Goal: Urinary catheter remains patent  Description: INTERVENTIONS:  - Assess patency of urinary catheter  - If patient has a chronic conde, consider changing catheter if non-functioning  - Follow guidelines for intermittent irrigation of non-functioning urinary catheter  Outcome: Progressing

## 2023-07-07 ENCOUNTER — APPOINTMENT (INPATIENT)
Dept: RADIOLOGY | Facility: HOSPITAL | Age: 88
DRG: 698 | End: 2023-07-07
Payer: MEDICARE

## 2023-07-07 LAB
ALBUMIN SERPL BCP-MCNC: 2.6 G/DL (ref 3.5–5)
ALP SERPL-CCNC: 124 U/L (ref 34–104)
ALT SERPL W P-5'-P-CCNC: 182 U/L (ref 7–52)
ANION GAP SERPL CALCULATED.3IONS-SCNC: 6 MMOL/L
AST SERPL W P-5'-P-CCNC: 52 U/L (ref 13–39)
ATRIAL RATE: 94 BPM
BASOPHILS # BLD AUTO: 0.02 THOUSANDS/ÂΜL (ref 0–0.1)
BASOPHILS NFR BLD AUTO: 0 % (ref 0–1)
BILIRUB SERPL-MCNC: 0.42 MG/DL (ref 0.2–1)
BUN SERPL-MCNC: 17 MG/DL (ref 5–25)
CALCIUM ALBUM COR SERPL-MCNC: 9.4 MG/DL (ref 8.3–10.1)
CALCIUM SERPL-MCNC: 8.3 MG/DL (ref 8.4–10.2)
CHLORIDE SERPL-SCNC: 107 MMOL/L (ref 96–108)
CO2 SERPL-SCNC: 23 MMOL/L (ref 21–32)
CREAT SERPL-MCNC: 0.51 MG/DL (ref 0.6–1.3)
EOSINOPHIL # BLD AUTO: 0.08 THOUSAND/ÂΜL (ref 0–0.61)
EOSINOPHIL NFR BLD AUTO: 2 % (ref 0–6)
ERYTHROCYTE [DISTWIDTH] IN BLOOD BY AUTOMATED COUNT: 16 % (ref 11.6–15.1)
GFR SERPL CREATININE-BSD FRML MDRD: 92 ML/MIN/1.73SQ M
GLUCOSE SERPL-MCNC: 102 MG/DL (ref 65–140)
GLUCOSE SERPL-MCNC: 116 MG/DL (ref 65–140)
GLUCOSE SERPL-MCNC: 136 MG/DL (ref 65–140)
GLUCOSE SERPL-MCNC: 152 MG/DL (ref 65–140)
GLUCOSE SERPL-MCNC: 178 MG/DL (ref 65–140)
HCT VFR BLD AUTO: 25.9 % (ref 36.5–49.3)
HGB BLD-MCNC: 7.9 G/DL (ref 12–17)
IMM GRANULOCYTES # BLD AUTO: 0.03 THOUSAND/UL (ref 0–0.2)
IMM GRANULOCYTES NFR BLD AUTO: 1 % (ref 0–2)
LYMPHOCYTES # BLD AUTO: 0.74 THOUSANDS/ÂΜL (ref 0.6–4.47)
LYMPHOCYTES NFR BLD AUTO: 14 % (ref 14–44)
MCH RBC QN AUTO: 28.8 PG (ref 26.8–34.3)
MCHC RBC AUTO-ENTMCNC: 30.5 G/DL (ref 31.4–37.4)
MCV RBC AUTO: 95 FL (ref 82–98)
MONOCYTES # BLD AUTO: 0.3 THOUSAND/ÂΜL (ref 0.17–1.22)
MONOCYTES NFR BLD AUTO: 6 % (ref 4–12)
NEUTROPHILS # BLD AUTO: 4.07 THOUSANDS/ÂΜL (ref 1.85–7.62)
NEUTS SEG NFR BLD AUTO: 77 % (ref 43–75)
NRBC BLD AUTO-RTO: 0 /100 WBCS
PLATELET # BLD AUTO: 134 THOUSANDS/UL (ref 149–390)
PMV BLD AUTO: 9.2 FL (ref 8.9–12.7)
POTASSIUM SERPL-SCNC: 3.7 MMOL/L (ref 3.5–5.3)
PROCALCITONIN SERPL-MCNC: 0.82 NG/ML
PROT SERPL-MCNC: 5.6 G/DL (ref 6.4–8.4)
QRS AXIS: -78 DEGREES
QRSD INTERVAL: 138 MS
QT INTERVAL: 386 MS
QTC INTERVAL: 474 MS
RBC # BLD AUTO: 2.74 MILLION/UL (ref 3.88–5.62)
SODIUM SERPL-SCNC: 136 MMOL/L (ref 135–147)
T WAVE AXIS: 67 DEGREES
VENTRICULAR RATE: 91 BPM
WBC # BLD AUTO: 5.24 THOUSAND/UL (ref 4.31–10.16)

## 2023-07-07 PROCEDURE — 99232 SBSQ HOSP IP/OBS MODERATE 35: CPT | Performed by: UROLOGY

## 2023-07-07 PROCEDURE — 82948 REAGENT STRIP/BLOOD GLUCOSE: CPT

## 2023-07-07 PROCEDURE — 80053 COMPREHEN METABOLIC PANEL: CPT | Performed by: HOSPITALIST

## 2023-07-07 PROCEDURE — 99222 1ST HOSP IP/OBS MODERATE 55: CPT | Performed by: PHYSICIAN ASSISTANT

## 2023-07-07 PROCEDURE — 99232 SBSQ HOSP IP/OBS MODERATE 35: CPT | Performed by: PHYSICIAN ASSISTANT

## 2023-07-07 PROCEDURE — 93010 ELECTROCARDIOGRAM REPORT: CPT | Performed by: INTERNAL MEDICINE

## 2023-07-07 PROCEDURE — 72100 X-RAY EXAM L-S SPINE 2/3 VWS: CPT

## 2023-07-07 PROCEDURE — 85025 COMPLETE CBC W/AUTO DIFF WBC: CPT | Performed by: HOSPITALIST

## 2023-07-07 PROCEDURE — 84145 PROCALCITONIN (PCT): CPT | Performed by: HOSPITALIST

## 2023-07-07 RX ORDER — CEFTRIAXONE 2 G/50ML
2000 INJECTION, SOLUTION INTRAVENOUS EVERY 24 HOURS
Status: DISCONTINUED | OUTPATIENT
Start: 2023-07-07 | End: 2023-07-07

## 2023-07-07 RX ORDER — CEFEPIME HYDROCHLORIDE 2 G/50ML
2000 INJECTION, SOLUTION INTRAVENOUS EVERY 12 HOURS
Status: DISCONTINUED | OUTPATIENT
Start: 2023-07-07 | End: 2023-07-09 | Stop reason: HOSPADM

## 2023-07-07 RX ORDER — FINASTERIDE 5 MG/1
5 TABLET, FILM COATED ORAL DAILY
Status: DISCONTINUED | OUTPATIENT
Start: 2023-07-07 | End: 2023-07-09 | Stop reason: HOSPADM

## 2023-07-07 RX ORDER — TAMSULOSIN HYDROCHLORIDE 0.4 MG/1
0.4 CAPSULE ORAL
Status: DISCONTINUED | OUTPATIENT
Start: 2023-07-07 | End: 2023-07-09 | Stop reason: HOSPADM

## 2023-07-07 RX ORDER — AMOXICILLIN 250 MG/1
500 CAPSULE ORAL EVERY 8 HOURS SCHEDULED
Status: DISCONTINUED | OUTPATIENT
Start: 2023-07-07 | End: 2023-07-09 | Stop reason: HOSPADM

## 2023-07-07 RX ADMIN — ATORVASTATIN CALCIUM 20 MG: 20 TABLET, FILM COATED ORAL at 16:42

## 2023-07-07 RX ADMIN — ENOXAPARIN SODIUM 40 MG: 100 INJECTION SUBCUTANEOUS at 08:59

## 2023-07-07 RX ADMIN — INSULIN LISPRO 1 UNITS: 100 INJECTION, SOLUTION INTRAVENOUS; SUBCUTANEOUS at 16:44

## 2023-07-07 RX ADMIN — AMOXICILLIN 500 MG: 250 CAPSULE ORAL at 14:32

## 2023-07-07 RX ADMIN — FINASTERIDE 5 MG: 5 TABLET, FILM COATED ORAL at 10:39

## 2023-07-07 RX ADMIN — TAMSULOSIN HYDROCHLORIDE 0.4 MG: 0.4 CAPSULE ORAL at 16:42

## 2023-07-07 RX ADMIN — SODIUM CHLORIDE 75 ML/HR: 0.9 INJECTION, SOLUTION INTRAVENOUS at 18:57

## 2023-07-07 RX ADMIN — METOPROLOL SUCCINATE 50 MG: 50 TABLET, EXTENDED RELEASE ORAL at 08:59

## 2023-07-07 RX ADMIN — AMOXICILLIN 500 MG: 250 CAPSULE ORAL at 21:41

## 2023-07-07 RX ADMIN — CEFEPIME HYDROCHLORIDE 2000 MG: 2 INJECTION, SOLUTION INTRAVENOUS at 17:52

## 2023-07-07 RX ADMIN — SODIUM CHLORIDE 75 ML/HR: 0.9 INJECTION, SOLUTION INTRAVENOUS at 01:00

## 2023-07-07 RX ADMIN — INSULIN LISPRO 1 UNITS: 100 INJECTION, SOLUTION INTRAVENOUS; SUBCUTANEOUS at 11:43

## 2023-07-07 RX ADMIN — CEFEPIME HYDROCHLORIDE 2000 MG: 2 INJECTION, SOLUTION INTRAVENOUS at 06:02

## 2023-07-07 RX ADMIN — METRONIDAZOLE 500 MG: 500 INJECTION, SOLUTION INTRAVENOUS at 03:30

## 2023-07-07 NOTE — TREATMENT PLAN
Upright lumbar x-rays reviewed with stable appearance of L3 wedge compression fracture. As previously mentioned, recommend bracing for comfort. Ongoing goals of care discussion. No further neurosurgical follow up needed. Call with questions. Neurosurgery will sign off.

## 2023-07-07 NOTE — PROGRESS NOTES
Progress Note - [facility] 80 y.o. male MRN: [id number]    Unit/Bed#: -[place] Encounter: [id number]       Assessment/Plan:-    UTI, Urosepsis, indwelling conde cath  CT showed    Left basilar density seen which may be due to atelectasis,/consolidation,Trace left effusion   Markedly thickened urinary bladder wall, may be sequela of chronic urinary bladder obstruction from markedly enlarged prostate. Clinical correlation and urology evaluation suggested for definite characterization and need for assessment with cystoscopy   No intra-abdominal fluid collection seen,Cholelithiasis seen  Trace pericholecystic fluid, nonspecific, correlate clinically if concern for acute cholecystitis   Fracture of the L3 vertebra with about 50% loss of the vertebral height with sclerosis, likely due to osteoporotic compression   Ulcerated irregular left chest wall mass involving the skin and the superficial soft tissue    -restart proscar  -cont conde  -conde changed on 6/5  -cont to follow cx    Chest wall mass, cholelithiasis, lethargy, compression fx  -mngt per primary    Subjective:   Denies any complains    Objective:     Vitals: Blood pressure 125/55, pulse 63, temperature 97.6 °F (36.4 °C), resp. rate 16, height 5' 8" (1.727 m), weight 62 kg (136 lb 11 oz), SpO2 95 %. ,Body mass index is 20.78 kg/m². Intake/Output Summary (Last 24 hours) at 7/7/2023 0916  Last data filed at 7/7/2023 0519  Gross per 24 hour   Intake 785 ml   Output 1225 ml   Net -440 ml       Physical Exam:   Awake, alert, NAD  AT,NC  CTAB, L chest wall mass  RRR  Abdomen NTND  Conde in bag, clear    Invasive Devices     Peripheral Intravenous Line  Duration           Peripheral IV 07/05/23 Dorsal (posterior); Right Forearm 2 days          Drain  Duration           Urethral Catheter Latex 16 Fr. 1 day

## 2023-07-07 NOTE — PROGRESS NOTES
[facility]  Progress Note  Name: Citlaly Vazquez  MRN: [id number]  Unit/Bed#: -01 I Date of Admission: 7/5/2023   Date of Service: 7/7/2023 I Hospital Day: 2    Assessment/Plan   * Sepsis [facility])  Assessment & Plan  · Presented to the emergency department due to sudden weakness, lethargy  · Found to be hypotensive in the ED. Met sepsis criteria with HR > 90 and leukocytosis of 20 K suspected due to urinary tract infection  · CT chest/abdomen/pelvis with diffuse bladder wall thickening. Questionable consolidation of the left lung which could be atelectasis. Trace pericholecystic fluid -correlate for acute cholecystitis. Ulcerated irregular left chest wall mass. · Blood pressure improved after IVF resuscitation. Remains afebrile  · Leukocytosis resolved  · Procalcitonin downtrending  · Right upper quadrant ultrasound consistent with cholecystitis  · Urine culture: Positive for Klebsiella oxytoca and Enterococcus species  · Initial blood culture positive x2 for Enterobacter cloacae- prelim ID enterobacter recommended IV cefepime.     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  · Reportedly has never been evaluated or biopsied  · Today, after wife met with palliative care, patient now desires to pursue biopsy, given stability of lesion, will place surgical oncology consult on discharge for biopsy    Acute metabolic encephalopathy  Assessment & Plan  · Presented with increased lethargy  · CT head negative for acute intracranial abnormalities  · Likely related to sepsis and hypotension  · Mentation greatly improved this morning, awake and alert  · Continue supportive care and treatment of metabolic abnormalities    L3 vertebral fracture (HCC)  Assessment & Plan  · Noted on CT abdomen/pelvis  · Patient denies any back pain  · No lower extremity weakness. Has chronic Conde catheter  · Prior provider discussed with neurosurgery - no interventions. Baseline x-ray obtained. No indication for brace unless patient has pain/radicular sx -use for comfort only    Hyponatremia  Assessment & Plan  · Sodium up to 134 on admission  · Initial hyponatremia likely related to hypovolemia  · Continue gentle IVF  · Trend BMP    Transaminitis  Assessment & Plan  ·   alk phos 123 normal T. Bili  · May be related to profound hypotension present on admission now resolved  · Right upper quadrant ultrasound: consistent with cholecystitis   · No clinical signs, surgery has signed off  · Flagyl discontinued  · GI consulted for definitive diagnosis given patient and wife's persistent concerns     Urinary tract infection  Assessment & Plan  · With chronic Conde catheter  · Urology consulted given CT findings and concern for penile wound related to chronic conde  · UA innumerable WBC, innumerable bacteria  · Urine culture: Klebsiella oxytoca and Enterococcus species  · Continue IV cefepime  · Flagyl     Elevated troponin  Assessment & Plan  · Denies chest pain  · Likely related to hypotension, sepsis         VTE Pharmacologic Prophylaxis: VTE Score: 6 High Risk (Score >/= 5) - Pharmacological DVT Prophylaxis Ordered: enoxaparin (Lovenox). Sequential Compression Devices Ordered. Patient Centered Rounds: I performed bedside rounds with nursing staff today. Discussions with Specialists or Other Care Team Provider: Appreciate palliative care note, discussed with case management. Appreciate most recent surgical note. Discussed with infectious disease     Education and Discussions with Family / Patient: Updated  (wife) at bedside. Total Time Spent on Date of Encounter in care of patient: 65 minutes This time was spent on one or more of the following: performing physical exam; counseling and coordination of care; obtaining or reviewing history; documenting in the medical record; reviewing/ordering tests, medications or procedures; communicating with other healthcare professionals and discussing with patient's family/caregivers. Current Length of Stay: 2 day(s)  Current Patient Status: Inpatient   Certification Statement: The patient will continue to require additional inpatient hospital stay due to IV antibiotics, pending culture results  Discharge Plan: Anticipate discharge in 48-72 hrs to home. Code Status: Level 1 - Full Code    Subjective:   Patient reports he feels fine, offers no complaints despite his multiple wounds, inverted Acosta catheter. He appears comfortable and wants to go home. After discussing with his wife he wants to pursue biopsy for his chest wall lesion. He would be willing to undergo cancer treatment if indicated per discussion with wife at bedside. Given patient's changing position on his care, will obtain neuropsych consultation. Objective:     Vitals:   Temp (24hrs), Av.6 °F (36.4 °C), Min:97.4 °F (36.3 °C), Max:97.8 °F (36.6 °C)    Temp:  [97.4 °F (36.3 °C)-97.8 °F (36.6 °C)] 97.4 °F (36.3 °C)  HR:  [62-65] 65  Resp:  [14-18] 18  BP: (125-154)/(55-68) 136/57  SpO2:  [93 %-97 %] 97 %  Body mass index is 20.78 kg/m². Input and Output Summary (last 24 hours): Intake/Output Summary (Last 24 hours) at 2023 1721  Last data filed at 2023 1649  Gross per 24 hour   Intake 2015 ml   Output 1775 ml   Net 240 ml       Physical Exam:   Physical Exam  Vitals and nursing note reviewed.    Constitutional:       General: He is not in acute distress. Appearance: Normal appearance. He is well-developed. HENT:      Head: Normocephalic and atraumatic. Eyes:      General: No scleral icterus. Conjunctiva/sclera: Conjunctivae normal.   Cardiovascular:      Rate and Rhythm: Normal rate and regular rhythm. Heart sounds: No murmur heard. Pulmonary:      Effort: Pulmonary effort is normal.      Breath sounds: No wheezing, rhonchi or rales. Abdominal:      General: There is no distension. Palpations: Abdomen is soft. Skin:     General: Skin is warm and dry. Findings: Lesion and wound present. Neurological:      General: No focal deficit present. Mental Status: He is alert.    Psychiatric:         Mood and Affect: Mood normal.       Additional Data:     Labs:  Results from last 7 days   Lab Units 07/07/23  0427   WBC Thousand/uL 5.24   HEMOGLOBIN g/dL 7.9*   HEMATOCRIT % 25.9*   PLATELETS Thousands/uL 134*   NEUTROS PCT % 77*   LYMPHS PCT % 14   MONOS PCT % 6   EOS PCT % 2     Results from last 7 days   Lab Units 07/07/23  0427   SODIUM mmol/L 136   POTASSIUM mmol/L 3.7   CHLORIDE mmol/L 107   CO2 mmol/L 23   BUN mg/dL 17   CREATININE mg/dL 0.51*   ANION GAP mmol/L 6   CALCIUM mg/dL 8.3*   ALBUMIN g/dL 2.6*   TOTAL BILIRUBIN mg/dL 0.42   ALK PHOS U/L 124*   ALT U/L 182*   AST U/L 52*   GLUCOSE RANDOM mg/dL 102     Results from last 7 days   Lab Units 07/05/23  0837   INR  1.15     Results from last 7 days   Lab Units 07/07/23  1641 07/07/23  1123 07/07/23  0738 07/06/23  2142 07/06/23  1603 07/06/23  1131 07/06/23  0608 07/05/23  2107 07/05/23  1802 07/05/23  0829   POC GLUCOSE mg/dl 152* 178* 116 141* 102 139 113 262* 244* 226*         Results from last 7 days   Lab Units 07/07/23  0427 07/06/23  0438 07/05/23  1547 07/05/23  0837   LACTIC ACID mmol/L  --   --  1.5 1.8   PROCALCITONIN ng/ml 0.82* 1.98* 3.21* 4.96*       Lines/Drains:  Invasive Devices     Peripheral Intravenous Line  Duration           Peripheral IV 07/05/23 Dorsal (posterior); Right Forearm 2 days          Drain  Duration           Urethral Catheter Latex 16 Fr. 2 days              Urinary Catheter:  Goal for removal: N/A - Chronic Acosta               Imaging: Reviewed radiology reports from this admission including: xray(s) and ultrasound(s)    Recent Cultures (last 7 days):   Results from last 7 days   Lab Units 07/06/23  0749 07/05/23  1049 07/05/23  0837   BLOOD CULTURE  No Growth at 24 hrs. No Growth at 24 hrs.  --  Enterobacter cloacae*  Enterobacter cloacae*   GRAM STAIN RESULT   --   --  Gram negative rods*  Gram negative rods*   URINE CULTURE   --  >100,000 cfu/ml Klebsiella oxytoca*  >100,000 cfu/ml Enterococcus species*  --        Last 24 Hours Medication List:   Current Facility-Administered Medications   Medication Dose Route Frequency Provider Last Rate   • acetaminophen  650 mg Oral Q6H PRN Day Damon PA-C     • amoxicillin  500 mg Oral [facility] Mili Clinton MD     • atorvastatin  20 mg Oral Daily With Dinner Day Damon PA-C     • cefepime  2,000 mg Intravenous Q12H Mili Clinton MD     • enoxaparin  40 mg Subcutaneous Daily Day Damon PA-C     • finasteride  5 mg Oral Daily Elyssa Bell PA-C     • insulin lispro  1-5 Units Subcutaneous TID AC Day Damon PA-C     • insulin lispro  1-5 Units Subcutaneous HS Day Damon PA-C     • metoprolol succinate  50 mg Oral Daily Day Damon PA-C     • ondansetron  4 mg Intravenous Q6H PRN Day Damon PA-C     • phenazopyridine  100 mg Oral TID PRN Day Damon PA-C     • sodium chloride  75 mL/hr Intravenous Continuous Day Damon PA-C 75 mL/hr (07/07/23 0100)   • tamsulosin  0.4 mg Oral Daily With Dinner Hebert Marina PA-C          Today, Patient Was Seen By: Hebert Marina PA-C    **Please Note: This note may have been constructed using a voice recognition system. **

## 2023-07-07 NOTE — ASSESSMENT & PLAN NOTE
· Noted on CT abdomen/pelvis  · Patient denies any back pain  · No lower extremity weakness. Has chronic Acosta catheter  · Prior provider discussed with neurosurgery - no interventions. Baseline x-ray obtained.   No indication for brace unless patient has pain/radicular sx -use for comfort only

## 2023-07-07 NOTE — ASSESSMENT & PLAN NOTE
· With chronic Conde catheter  · Urology consulted given CT findings and concern for penile wound related to chronic conde  · UA innumerable WBC, innumerable bacteria  · Urine culture: Klebsiella oxytoca and Enterococcus species  · Continue IV cefepime  · Flagyl

## 2023-07-07 NOTE — ASSESSMENT & PLAN NOTE
· Presented to the emergency department due to sudden weakness, lethargy  · Found to be hypotensive in the ED. Met sepsis criteria with HR > 90 and leukocytosis of 20 K suspected due to urinary tract infection  · CT chest/abdomen/pelvis with diffuse bladder wall thickening. Questionable consolidation of the left lung which could be atelectasis. Trace pericholecystic fluid -correlate for acute cholecystitis. Ulcerated irregular left chest wall mass. · Blood pressure improved after IVF resuscitation. Remains afebrile  · Leukocytosis resolved  · Procalcitonin downtrending  · Right upper quadrant ultrasound consistent with cholecystitis  · Urine culture: Positive for Klebsiella oxytoca and Enterococcus species  · Initial blood culture positive x2 for Enterobacter cloacae- prelim ID enterobacter recommended IV cefepime.     · Currently on IV cefepime - Flagyl discontinued  · Repeat blood cultures x2 negative x24 hours  · ID consulted - appreciate recommendations

## 2023-07-07 NOTE — CONSULTS
Consultation - Palliative and Supportive Care   Teresa Chadwick 80 y.o. 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 Symptom management:  - pt denies significant symptoms     2. Goals / Recommendations:    · Neither Corbin nor spouse/Kimberly are entirely receptive to goals discussions however they are willing to speak with me. · Antonio Eden seems confused on exam and is not a reliable historian, however spouse says he is perfectly competent except he "gets like this" around medical providers. History obtained through wife who is POA. · Although Antonio Eden can ambulate short distances, he is dependent for most ADLs and has poor functional status. · Given risk for recurrent infection, cancer progression, etc, Corbin's clear preference is to return home as he does not like coming to the hospital and does not want work up for skin mass. Likely, his goals line up with hospice/palliative care, however would have to re-discuss as he is hesitant to have people coming to the house. · Spouse/Kimberly does not accept Corbin's wish to stay home/focus on comfort. Although she agrees that the patient would not tolerate intense treatment, she would like the mass biopsied so they can hear options. · I explained that POA privileges do not come into effect until the pt is non-competent. Bc Moon is intent on having her wishes honored regardless of his   · Level 1 code status - despite discussion of risk v benefit, spouse/Kimberly is "not prepared" to consider the question of code status at this time. · Will defer determination of the patient's competence to the primary team.   · I asked Kimberly/spouse to bring pt's Living Will into hospital so it can be scanned.    · For now, continue full cares without limits. · Palliative Care will sign off. Call again if situation changes. Social support:  • Time spent providing supportive listening. • Ashely Mccann and Ivonne Garcia live together. They have no children, however they do have extended family next door. I have reviewed the patient's controlled substance dispensing history in the Prescription Drug Monitoring Program in compliance with the [facility] regulations before prescribing any controlled substances. Last refills - N/A    Decisional apparatus:  Patient is likely not competent on exam today. If competence is lost, patient's substitute decision maker would default to spouse/Kimberly by [place] Act 169. Advance Directive/Living Will/POLST: none on file, however Ivonne Garcia reports that pt has a Living Will/POA form and I asked her to bring it in. We appreciate the invitation to be involved in this patient's care. We will continue to follow throughout this hospitalization. Please do not hesitate to reach our on call provider through our clinic answering service at [phone] should you have acute symptom control concerns. Shahla Beach PA-C  Palliative and Supportive Care  Clinic/Answering Service: [phone]  You can find me on [email or web address]! IDENTIFICATION:  Inpatient consult to Palliative Care  Consult performed by: Shahla Beach PA-C  Consult ordered by: Minoo Montoya PA-C        Physician Requesting Consult: Lenard Toledo MD  Reason for Consult / Principal Problem: GOC counseling and SM secondary to suspected cancer, bacteremia     History of Present Illness:  Willard Grande is a 80 y.o. male who presents with lethargy. While in has a past medical history of chronic Acosta catheter and recently treated UTI. He has a past medical history of chest wall mass and is not pursuing treatment. On 7/4/2023, he was noted to have extreme lethargy at home and was brought to the ER.   On presentation he was found to be hypotensive which responded to IV fluids. He met sepsis criteria with UTI as well as bacteremia. Infectious diseases on board. 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 The patient does not seem to be a reliable historian on my visit today. He is not able to tell me why he is in the hospital.  When asked if he was aware he had an infection, he did know this but he did not know where it had come from. He is clear that he would not want the mass biopsied and that he would be happier at home. He gives me permission to call his wife Lefty Benson. Lefty Benson expresses that the patient is able to make his own decisions however he has a "white coat syndrome" and freezes up around physicians. She states that he has poor understanding of medical updates because of this, however in other settings he is perfectly competent. She acknowledges that his wish would be to return home and focus on comfort however she is not willing to allow this. She is clear that she wants the mass biopsied even though for the last 7 years he has not wanted this. She states that he has future decline at home she is bringing him right back to the hospital.  We discussed long-term planning as he is likely to have frequent infections and further decline. I explained that we must take his definition of quality of life into consideration. She is aware there will come a time where it will no longer be beneficial to bring him to the hospital but she does not believe we are there yet. She would like us to look into the mass to see what can be done. She does not think he would survive the surgery or any systemic cancer treatment. She is not interested in having additional help in the home including palliative care and certainly not hospice.   She is interested in readmissions for him at this time. I explained that her power of  privileges only take effect when the patient is incompetent and if she believes that he is competent we will listen to his wishes on this matter. She is frustrated by this. I gave her my information in case she has further questions or concerns but at this time she does not have any. Review of Systems:    Review of Systems   Constitutional: Positive for malaise/fatigue. Negative for decreased appetite. HENT: Negative for congestion. Cardiovascular: Negative for chest pain and dyspnea on exertion. Skin: Positive for dry skin and skin cancer. Musculoskeletal: Negative for arthritis and back pain. Gastrointestinal: Negative for bloating, constipation and vomiting. Genitourinary: Negative for bladder incontinence and decreased libido. Neurological: Negative for aphonia and light-headedness. Psychiatric/Behavioral: Negative for depression and hallucinations.        Past Medical History:   Diagnosis Date   • Coronary artery disease    • Diabetes mellitus ([address])    • Renal disorder      Past Surgical History:   Procedure Laterality Date   • CORONARY ANGIOPLASTY WITH STENT PLACEMENT       Social History     Socioeconomic History   • Marital status: /Civil Union     Spouse name: Not on file   • Number of children: Not on file   • Years of education: Not on file   • Highest education level: Not on file   Occupational History   • Not on file   Tobacco Use   • Smoking status: Former     Types: Cigarettes   • Smokeless tobacco: Never   Vaping Use   • Vaping Use: Never used   Substance and Sexual Activity   • Alcohol use: Never   • Drug use: Never   • Sexual activity: Not on file   Other Topics Concern   • Not on file   Social History Narrative   • Not on file     Social Determinants of Health     Financial Resource Strain: Not on file   Food Insecurity: No Food Insecurity (7/6/2023)    Hunger Vital Sign    • Worried About Running Out of Food in the Last Year: Never true    • Ran Out of Food in the Last Year: Never true   Transportation Needs: No Transportation Needs (7/6/2023)    PRAPARE - Transportation    • Lack of Transportation (Medical): No    • Lack of Transportation (Non-Medical): No   Physical Activity: Not on file   Stress: Not on file   Social Connections: Not on file   Intimate Partner Violence: Not on file   Housing Stability: Low Risk  (7/6/2023)    Housing Stability Vital Sign    • Unable to Pay for Housing in the Last Year: No    • Number of Places Lived in the Last Year: 1    • Unstable Housing in the Last Year: No     History reviewed. No pertinent family history.     Medications:  all current active meds have been reviewed and current meds:   Current Facility-Administered Medications   Medication Dose Route Frequency   • acetaminophen (TYLENOL) tablet 650 mg  650 mg Oral Q6H PRN   • amoxicillin (AMOXIL) capsule 500 mg  500 mg Oral Q8H [address]   • atorvastatin (LIPITOR) tablet 20 mg  20 mg Oral Daily With Dinner   • cefepime (MAXIPIME) IVPB (premix in dextrose) 2,000 mg 50 mL  2,000 mg Intravenous Q12H   • enoxaparin (LOVENOX) subcutaneous injection 40 mg  40 mg Subcutaneous Daily   • finasteride (PROSCAR) tablet 5 mg  5 mg Oral Daily   • insulin lispro (HumaLOG) 100 units/mL subcutaneous injection 1-5 Units  1-5 Units Subcutaneous TID AC   • insulin lispro (HumaLOG) 100 units/mL subcutaneous injection 1-5 Units  1-5 Units Subcutaneous HS   • metoprolol succinate (TOPROL-XL) 24 hr tablet 50 mg  50 mg Oral Daily   • ondansetron (ZOFRAN) injection 4 mg  4 mg Intravenous Q6H PRN   • phenazopyridine (PYRIDIUM) tablet 100 mg  100 mg Oral TID PRN   • sodium chloride 0.9 % infusion  75 mL/hr Intravenous Continuous   • tamsulosin (FLOMAX) capsule 0.4 mg  0.4 mg Oral Daily With Dinner       Allergies   Allergen Reactions   • Levaquin [Levofloxacin] Confusion     Medications    Current Facility-Administered Medications:   • acetaminophen (TYLENOL) tablet 650 mg, 650 mg, Oral, Q6H PRN, Gaby Major PA-C  •  amoxicillin (AMOXIL) capsule 500 mg, 500 mg, Oral, Q8H [address], Dariana Han MD  •  atorvastatin (LIPITOR) tablet 20 mg, 20 mg, Oral, Daily With Abilio Guerrero PA-C, 20 mg at 07/06/23 1642  •  cefepime (MAXIPIME) IVPB (premix in dextrose) 2,000 mg 50 mL, 2,000 mg, Intravenous, Q12H, Dariana Han MD  •  enoxaparin (LOVENOX) subcutaneous injection 40 mg, 40 mg, Subcutaneous, Daily, Gaby Major PA-C, 40 mg at 07/07/23 6839  •  finasteride (PROSCAR) tablet 5 mg, 5 mg, Oral, Daily, Elyssa Bell PA-C, 5 mg at 07/07/23 1039  •  insulin lispro (HumaLOG) 100 units/mL subcutaneous injection 1-5 Units, 1-5 Units, Subcutaneous, TID AC, 1 Units at 07/07/23 1143 **AND** Fingerstick Glucose (POCT), , , TID AC, Gaby Major PA-C  •  insulin lispro (HumaLOG) 100 units/mL subcutaneous injection 1-5 Units, 1-5 Units, Subcutaneous, HS, Gaby Major PA-C, 2 Units at 07/05/23 2135  •  metoprolol succinate (TOPROL-XL) 24 hr tablet 50 mg, 50 mg, Oral, Daily, Gaby Major PA-C, 50 mg at 07/07/23 1606  •  ondansetron (ZOFRAN) injection 4 mg, 4 mg, Intravenous, Q6H PRN, Gaby Major PA-C  •  phenazopyridine (PYRIDIUM) tablet 100 mg, 100 mg, Oral, TID PRN, Gaby Major PA-C  •  sodium chloride 0.9 % infusion, 75 mL/hr, Intravenous, Continuous, Gaby Major PA-C, Last Rate: 75 mL/hr at 07/07/23 0100, 75 mL/hr at 07/07/23 0100  •  tamsulosin (FLOMAX) capsule 0.4 mg, 0.4 mg, Oral, Daily With Dinner, Niecy VILLAR PA-C    Objective  /55   Pulse 63   Temp 97.6 °F (36.4 °C)   Resp 16   Ht 5' 8" (1.727 m)   Wt 62 kg (136 lb 11 oz)   SpO2 95%   BMI 20.78 kg/m²     Physical Exam  Vitals and nursing note reviewed. Constitutional:       General: He is not in acute distress. Appearance: He is well-developed. He is obese. He is ill-appearing. HENT:      Head: Normocephalic and atraumatic. Eyes:      General:         Right eye: No discharge. Left eye: No discharge. Conjunctiva/sclera: Conjunctivae normal.   Cardiovascular:      Rate and Rhythm: Normal rate. Pulmonary:      Effort: Pulmonary effort is normal. No respiratory distress. Musculoskeletal:         General: No swelling. Cervical back: Neck supple. Skin:     General: Skin is warm and dry. Coloration: Skin is pale. Neurological:      Mental Status: He is alert. Psychiatric:         Mood and Affect: Mood normal.         Behavior: Behavior normal.       Lab Results:   I have personally reviewed pertinent labs. , CBC:   Lab Results   Component Value Date    WBC 5.24 07/07/2023    HGB 7.9 (L) 07/07/2023    HCT 25.9 (L) 07/07/2023    MCV 95 07/07/2023     (L) 07/07/2023    RBC 2.74 (L) 07/07/2023    MCH 28.8 07/07/2023    MCHC 30.5 (L) 07/07/2023    RDW 16.0 (H) 07/07/2023    MPV 9.2 07/07/2023    NRBC 0 07/07/2023   , CMP:   Lab Results   Component Value Date    SODIUM 136 07/07/2023    K 3.7 07/07/2023     07/07/2023    CO2 23 07/07/2023    BUN 17 07/07/2023    CREATININE 0.51 (L) 07/07/2023    CALCIUM 8.3 (L) 07/07/2023    AST 52 (H) 07/07/2023     (H) 07/07/2023    ALKPHOS 124 (H) 07/07/2023    EGFR 92 07/07/2023     Imaging Studies: I have personally reviewed pertinent reports. EKG, Pathology, and Other Studies: I have personally reviewed pertinent reports. Counseling / Coordination of Care  Total floor / unit time spent today 75 minutes. Greater than 50% of total time was spent with the patient and / or family counseling and / or coordination of care.  A description of the counseling / coordination of care: reviewed chart, reviewed lab values, reviewed imaging, provided medical updates, discussed palliative care and symptom management, discussed hospice care and comfort care, discussed goals of care, discussed code status, discussed advanced directives, assessed POA/HCA, provided supportive listening, provided anticipatory guidance, provided psychosocial and emotional support, assessed competency and decision-making and facilitated interdisciplinary communication. Reviewed with SLIM team, RN and CM. Portions of this document may have been created using dictation software and as such some "sound alike" terms may have been generated by the system. Do not hesitate to contact me with any questions or clarifications.

## 2023-07-07 NOTE — CASE MANAGEMENT
Case Management Discharge Planning Note    Patient name [facility]  Location /-99 MRN [id number]  : 1929 Date 2023       Current Admission Date: 2023  Current Admission Diagnosis:Sepsis [facility])   Patient Active Problem List    Diagnosis Date Noted   • Urinary tract infection 2023   • Transaminitis 2023   • Chest wall mass 2023   • Hyponatremia 2023   • L3 vertebral fracture ([address]) 2023   • Acute metabolic encephalopathy    • Gram-negative bacteremia 2023   • Sepsis ([address]) 2023   • Elevated troponin 2023      LOS (days): 2  Geometric Mean LOS (GMLOS) (days): 5.00  Days to GMLOS:2.8     OBJECTIVE:  Risk of Unplanned Readmission Score: 12.41      Current admission status: Inpatient   Preferred Pharmacy: No Pharmacies Listed  Primary Care Provider: No primary care provider on file. Primary Insurance: MEDICARE  Secondary Insurance:     DISCHARGE DETAILS:  IMM Given (Date):: 23  IMM Given to[de-identified] Family  Family notified[de-identified] Reviewed with pt and his spouse. Cm also confirmed that her plan is to take pt home.

## 2023-07-07 NOTE — ASSESSMENT & PLAN NOTE
·   alk phos 123 normal T. Bili  · May be related to profound hypotension present on admission now resolved  · Right upper quadrant ultrasound: consistent with cholecystitis   · No clinical signs, surgery has signed off  · Flagyl discontinued  · GI consulted for definitive diagnosis given patient and wife's persistent concerns

## 2023-07-07 NOTE — ASSESSMENT & PLAN NOTE
· Patient with known chest wall mass that has been present for several years  · Reportedly has never been evaluated or biopsied  · Today, after wife met with palliative care, patient now desires to pursue biopsy, given stability of lesion, will place surgical oncology consult on discharge for biopsy

## 2023-07-07 NOTE — ASSESSMENT & PLAN NOTE
· Blood cultures positive x2 on admission for Enterobacter  · Urine positive for Klebsiella and Enterococcus  · Prelim ID panel Enterobacter  · Continue IV cefepime  · Leukocytosis resolved, procalcitonin downtrending  · ID following  · Trend WBC and fever curve

## 2023-07-07 NOTE — PLAN OF CARE
Problem: MOBILITY - ADULT  Goal: Maintain or return to baseline ADL function  Description: INTERVENTIONS:  -  Assess patient's ability to carry out ADLs; assess patient's baseline for ADL function and identify physical deficits which impact ability to perform ADLs (bathing, care of mouth/teeth, toileting, grooming, dressing, etc.)  - Assess/evaluate cause of self-care deficits   - Assess range of motion  - Assess patient's mobility; develop plan if impaired  - Assess patient's need for assistive devices and provide as appropriate  - Encourage maximum independence but intervene and supervise when necessary  - Involve family in performance of ADLs  - Assess for home care needs following discharge   - Consider OT consult to assist with ADL evaluation and planning for discharge  - Provide patient education as appropriate  Outcome: Progressing  Goal: Maintains/Returns to pre admission functional level  Description: INTERVENTIONS:  - Perform BMAT or MOVE assessment daily.   - Set and communicate daily mobility goal to care team and patient/family/caregiver. - Collaborate with rehabilitation services on mobility goals if consulted  - Perform Range of Motion 3 times a day. - Reposition patient every 2 hours.   - Dangle patient 3 times a day  - Stand patient 3 times a day  - Ambulate patient 3 times a day  - Out of bed to chair 3 times a day   - Out of bed for meals 3 times a day  - Out of bed for toileting  - Record patient progress and toleration of activity level   Outcome: Progressing     Problem: Prexisting or High Potential for Compromised Skin Integrity  Goal: Skin integrity is maintained or improved  Description: INTERVENTIONS:  - Identify patients at risk for skin breakdown  - Assess and monitor skin integrity  - Assess and monitor nutrition and hydration status  - Monitor labs   - Assess for incontinence   - Turn and reposition patient  - Assist with mobility/ambulation  - Relieve pressure over bony prominences  - Avoid friction and shearing  - Provide appropriate hygiene as needed including keeping skin clean and dry  - Evaluate need for skin moisturizer/barrier cream  - Collaborate with interdisciplinary team   - Patient/family teaching  - Consider wound care consult   Outcome: Progressing     Problem: PAIN - ADULT  Goal: Verbalizes/displays adequate comfort level or baseline comfort level  Description: Interventions:  - Encourage patient to monitor pain and request assistance  - Assess pain using appropriate pain scale  - Administer analgesics based on type and severity of pain and evaluate response  - Implement non-pharmacological measures as appropriate and evaluate response  - Consider cultural and social influences on pain and pain management  - Notify physician/advanced practitioner if interventions unsuccessful or patient reports new pain  Outcome: Progressing     Problem: INFECTION - ADULT  Goal: Absence or prevention of progression during hospitalization  Description: INTERVENTIONS:  - Assess and monitor for signs and symptoms of infection  - Monitor lab/diagnostic results  - Monitor all insertion sites, i.e. indwelling lines, tubes, and drains  - Monitor endotracheal if appropriate and nasal secretions for changes in amount and color  - [place] appropriate cooling/warming therapies per order  - Administer medications as ordered  - Instruct and encourage patient and family to use good hand hygiene technique  - Identify and instruct in appropriate isolation precautions for identified infection/condition  Outcome: Progressing     Problem: SAFETY ADULT  Goal: Patient will remain free of falls  Description: INTERVENTIONS:  - Educate patient/family on patient safety including physical limitations  - Instruct patient to call for assistance with activity   - Consult OT/PT to assist with strengthening/mobility   - Keep Call bell within reach  - Keep bed low and locked with side rails adjusted as appropriate  - Keep care items and personal belongings within reach  - Initiate and maintain comfort rounds  - Make Fall Risk Sign visible to staff  - Offer Toileting every 2 Hours, in advance of need  - Initiate/Maintain bed/chair alarm  - Obtain necessary fall risk management equipment:     - Apply yellow socks and bracelet for high fall risk patients  - Consider moving patient to room near nurses station  Outcome: Progressing     Problem: CARDIOVASCULAR - ADULT  Goal: Maintains optimal cardiac output and hemodynamic stability  Description: INTERVENTIONS:  - Monitor I/O, vital signs and rhythm  - Monitor for S/S and trends of decreased cardiac output  - Administer and titrate ordered vasoactive medications to optimize hemodynamic stability  - Assess quality of pulses, skin color and temperature  - Assess for signs of decreased coronary artery perfusion  - Instruct patient to report change in severity of symptoms  Outcome: Progressing     Problem: RESPIRATORY - ADULT  Goal: Achieves optimal ventilation and oxygenation  Description: INTERVENTIONS:  - Assess for changes in respiratory status  - Assess for changes in mentation and behavior  - Position to facilitate oxygenation and minimize respiratory effort  - Oxygen administered by appropriate delivery if ordered  - Initiate smoking cessation education as indicated  - Encourage broncho-pulmonary hygiene including cough, deep breathe, Incentive Spirometry  - Assess the need for suctioning and aspirate as needed  - Assess and instruct to report SOB or any respiratory difficulty  - Respiratory Therapy support as indicated  Outcome: Progressing     Problem: GENITOURINARY - ADULT  Goal: Urinary catheter remains patent  Description: INTERVENTIONS:  - Assess patency of urinary catheter  - If patient has a chronic conde, consider changing catheter if non-functioning  - Follow guidelines for intermittent irrigation of non-functioning urinary catheter  Outcome: Progressing Problem: Nutrition/Hydration-ADULT  Goal: Nutrient/Hydration intake appropriate for improving, restoring or maintaining nutritional needs  Description: Monitor and assess patient's nutrition/hydration status for malnutrition. Collaborate with interdisciplinary team and initiate plan and interventions as ordered. Monitor patient's weight and dietary intake as ordered or per policy. Utilize nutrition screening tool and intervene as necessary. Determine patient's food preferences and provide high-protein, high-caloric foods as appropriate.      INTERVENTIONS:  - Monitor oral intake, urinary output, labs, and treatment plans  - Assess nutrition and hydration status and recommend course of action  - Evaluate amount of meals eaten  - Assist patient with eating if necessary   - Allow adequate time for meals  - Recommend/ encourage appropriate diets, oral nutritional supplements, and vitamin/mineral supplements  - Order, calculate, and assess calorie counts as needed  - Recommend, monitor, and adjust tube feedings and TPN/PPN based on assessed needs  - Assess need for intravenous fluids  - Provide specific nutrition/hydration education as appropriate  - Include patient/family/caregiver in decisions related to nutrition  Outcome: Progressing     Problem: Potential for Falls  Goal: Patient will remain free of falls  Description: INTERVENTIONS:  - Educate patient/family on patient safety including physical limitations  - Instruct patient to call for assistance with activity   - Consult OT/PT to assist with strengthening/mobility   - Keep Call bell within reach  - Keep bed low and locked with side rails adjusted as appropriate  - Keep care items and personal belongings within reach  - Initiate and maintain comfort rounds  - Make Fall Risk Sign visible to staff  - Offer Toileting every 2 Hours, in advance of need  - Initiate/Maintain bed/chair alarm  - Obtain necessary fall risk management equipment:     - Apply yellow socks and bracelet for high fall risk patients  - Consider moving patient to room near nurses station  Outcome: Progressing

## 2023-07-08 PROBLEM — G93.41 ACUTE METABOLIC ENCEPHALOPATHY: Status: RESOLVED | Noted: 2023-07-06 | Resolved: 2023-07-08

## 2023-07-08 LAB
ALBUMIN SERPL BCP-MCNC: 2.6 G/DL (ref 3.5–5)
ALP SERPL-CCNC: 134 U/L (ref 34–104)
ALT SERPL W P-5'-P-CCNC: 121 U/L (ref 7–52)
ANION GAP SERPL CALCULATED.3IONS-SCNC: 4 MMOL/L
AST SERPL W P-5'-P-CCNC: 26 U/L (ref 13–39)
BACTERIA BLD CULT: ABNORMAL
BACTERIA BLD CULT: ABNORMAL
BACTERIA UR CULT: ABNORMAL
BACTERIA UR CULT: ABNORMAL
BASOPHILS # BLD AUTO: 0.02 THOUSANDS/ÂΜL (ref 0–0.1)
BASOPHILS NFR BLD AUTO: 0 % (ref 0–1)
BILIRUB SERPL-MCNC: 0.38 MG/DL (ref 0.2–1)
BUN SERPL-MCNC: 15 MG/DL (ref 5–25)
CALCIUM ALBUM COR SERPL-MCNC: 9.3 MG/DL (ref 8.3–10.1)
CALCIUM SERPL-MCNC: 8.2 MG/DL (ref 8.4–10.2)
CHLORIDE SERPL-SCNC: 105 MMOL/L (ref 96–108)
CO2 SERPL-SCNC: 24 MMOL/L (ref 21–32)
CREAT SERPL-MCNC: 0.42 MG/DL (ref 0.6–1.3)
E CLOAC COMP DNA BLD POS NAA+NON-PROBE: DETECTED
EOSINOPHIL # BLD AUTO: 0.09 THOUSAND/ÂΜL (ref 0–0.61)
EOSINOPHIL NFR BLD AUTO: 2 % (ref 0–6)
ERYTHROCYTE [DISTWIDTH] IN BLOOD BY AUTOMATED COUNT: 15.9 % (ref 11.6–15.1)
GFR SERPL CREATININE-BSD FRML MDRD: 100 ML/MIN/1.73SQ M
GLUCOSE SERPL-MCNC: 102 MG/DL (ref 65–140)
GLUCOSE SERPL-MCNC: 107 MG/DL (ref 65–140)
GLUCOSE SERPL-MCNC: 123 MG/DL (ref 65–140)
GLUCOSE SERPL-MCNC: 144 MG/DL (ref 65–140)
GRAM STN SPEC: ABNORMAL
GRAM STN SPEC: ABNORMAL
HCT VFR BLD AUTO: 26.4 % (ref 36.5–49.3)
HGB BLD-MCNC: 8.3 G/DL (ref 12–17)
IMM GRANULOCYTES # BLD AUTO: 0.04 THOUSAND/UL (ref 0–0.2)
IMM GRANULOCYTES NFR BLD AUTO: 1 % (ref 0–2)
LYMPHOCYTES # BLD AUTO: 1.03 THOUSANDS/ÂΜL (ref 0.6–4.47)
LYMPHOCYTES NFR BLD AUTO: 21 % (ref 14–44)
MCH RBC QN AUTO: 28.9 PG (ref 26.8–34.3)
MCHC RBC AUTO-ENTMCNC: 31.4 G/DL (ref 31.4–37.4)
MCV RBC AUTO: 92 FL (ref 82–98)
MONOCYTES # BLD AUTO: 0.29 THOUSAND/ÂΜL (ref 0.17–1.22)
MONOCYTES NFR BLD AUTO: 6 % (ref 4–12)
NEUTROPHILS # BLD AUTO: 3.5 THOUSANDS/ÂΜL (ref 1.85–7.62)
NEUTS SEG NFR BLD AUTO: 70 % (ref 43–75)
NRBC BLD AUTO-RTO: 0 /100 WBCS
PLATELET # BLD AUTO: 145 THOUSANDS/UL (ref 149–390)
PMV BLD AUTO: 9.1 FL (ref 8.9–12.7)
POTASSIUM SERPL-SCNC: 3.6 MMOL/L (ref 3.5–5.3)
PROT SERPL-MCNC: 5.6 G/DL (ref 6.4–8.4)
RBC # BLD AUTO: 2.87 MILLION/UL (ref 3.88–5.62)
SODIUM SERPL-SCNC: 133 MMOL/L (ref 135–147)
WBC # BLD AUTO: 4.97 THOUSAND/UL (ref 4.31–10.16)

## 2023-07-08 PROCEDURE — 82948 REAGENT STRIP/BLOOD GLUCOSE: CPT

## 2023-07-08 PROCEDURE — 80053 COMPREHEN METABOLIC PANEL: CPT | Performed by: PHYSICIAN ASSISTANT

## 2023-07-08 PROCEDURE — 99232 SBSQ HOSP IP/OBS MODERATE 35: CPT | Performed by: PHYSICIAN ASSISTANT

## 2023-07-08 PROCEDURE — 85025 COMPLETE CBC W/AUTO DIFF WBC: CPT | Performed by: PHYSICIAN ASSISTANT

## 2023-07-08 RX ORDER — METRONIDAZOLE 500 MG/1
500 TABLET ORAL EVERY 12 HOURS SCHEDULED
Qty: 28 TABLET | Refills: 0 | Status: SHIPPED | OUTPATIENT
Start: 2023-07-08 | End: 2023-07-22

## 2023-07-08 RX ORDER — CEFDINIR 300 MG/1
300 CAPSULE ORAL EVERY 12 HOURS SCHEDULED
Qty: 28 CAPSULE | Refills: 0 | Status: SHIPPED | OUTPATIENT
Start: 2023-07-08 | End: 2023-07-22

## 2023-07-08 RX ORDER — TAMSULOSIN HYDROCHLORIDE 0.4 MG/1
0.4 CAPSULE ORAL
Qty: 30 CAPSULE | Refills: 0 | Status: SHIPPED | OUTPATIENT
Start: 2023-07-08

## 2023-07-08 RX ORDER — AMOXICILLIN 500 MG/1
500 CAPSULE ORAL EVERY 8 HOURS SCHEDULED
Qty: 15 CAPSULE | Refills: 0 | Status: SHIPPED | OUTPATIENT
Start: 2023-07-08 | End: 2023-07-13

## 2023-07-08 RX ADMIN — ATORVASTATIN CALCIUM 20 MG: 20 TABLET, FILM COATED ORAL at 16:51

## 2023-07-08 RX ADMIN — AMOXICILLIN 500 MG: 250 CAPSULE ORAL at 21:54

## 2023-07-08 RX ADMIN — FINASTERIDE 5 MG: 5 TABLET, FILM COATED ORAL at 08:45

## 2023-07-08 RX ADMIN — METOPROLOL SUCCINATE 50 MG: 50 TABLET, EXTENDED RELEASE ORAL at 08:45

## 2023-07-08 RX ADMIN — AMOXICILLIN 500 MG: 250 CAPSULE ORAL at 05:18

## 2023-07-08 RX ADMIN — SODIUM CHLORIDE 75 ML/HR: 0.9 INJECTION, SOLUTION INTRAVENOUS at 07:15

## 2023-07-08 RX ADMIN — TAMSULOSIN HYDROCHLORIDE 0.4 MG: 0.4 CAPSULE ORAL at 16:51

## 2023-07-08 RX ADMIN — INSULIN LISPRO 1 UNITS: 100 INJECTION, SOLUTION INTRAVENOUS; SUBCUTANEOUS at 21:54

## 2023-07-08 RX ADMIN — CEFEPIME HYDROCHLORIDE 2000 MG: 2 INJECTION, SOLUTION INTRAVENOUS at 05:18

## 2023-07-08 RX ADMIN — ENOXAPARIN SODIUM 40 MG: 100 INJECTION SUBCUTANEOUS at 08:45

## 2023-07-08 RX ADMIN — AMOXICILLIN 500 MG: 250 CAPSULE ORAL at 14:44

## 2023-07-08 NOTE — ASSESSMENT & PLAN NOTE
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

## 2023-07-08 NOTE — ASSESSMENT & PLAN NOTE
· Blood cultures positive x2 on admission for Enterobacter  · Urine positive for Klebsiella and Enterococcus  · Prelim ID panel Enterobacter  · Continue IV cefepime while admitted, can transition to amoxicillin, Flagyl and Omnicef for discharge  · Leukocytosis resolved, procalcitonin downtrending  · ID following  · Trend WBC and fever curve

## 2023-07-08 NOTE — PLAN OF CARE
Problem: MOBILITY - ADULT  Goal: Maintain or return to baseline ADL function  Description: INTERVENTIONS:  -  Assess patient's ability to carry out ADLs; assess patient's baseline for ADL function and identify physical deficits which impact ability to perform ADLs (bathing, care of mouth/teeth, toileting, grooming, dressing, etc.)  - Assess/evaluate cause of self-care deficits   - Assess range of motion  - Assess patient's mobility; develop plan if impaired  - Assess patient's need for assistive devices and provide as appropriate  - Encourage maximum independence but intervene and supervise when necessary  - Involve family in performance of ADLs  - Assess for home care needs following discharge   - Consider OT consult to assist with ADL evaluation and planning for discharge  - Provide patient education as appropriate  Outcome: Progressing     Problem: Prexisting or High Potential for Compromised Skin Integrity  Goal: Skin integrity is maintained or improved  Description: INTERVENTIONS:  - Identify patients at risk for skin breakdown  - Assess and monitor skin integrity  - Assess and monitor nutrition and hydration status  - Monitor labs   - Assess for incontinence   - Turn and reposition patient  - Assist with mobility/ambulation  - Relieve pressure over bony prominences  - Avoid friction and shearing  - Provide appropriate hygiene as needed including keeping skin clean and dry  - Evaluate need for skin moisturizer/barrier cream  - Collaborate with interdisciplinary team   - Patient/family teaching  - Consider wound care consult   Outcome: Progressing     Problem: PAIN - ADULT  Goal: Verbalizes/displays adequate comfort level or baseline comfort level  Description: Interventions:  - Encourage patient to monitor pain and request assistance  - Assess pain using appropriate pain scale  - Administer analgesics based on type and severity of pain and evaluate response  - Implement non-pharmacological measures as appropriate and evaluate response  - Consider cultural and social influences on pain and pain management  - Notify physician/advanced practitioner if interventions unsuccessful or patient reports new pain  Outcome: Progressing     Problem: INFECTION - ADULT  Goal: Absence or prevention of progression during hospitalization  Description: INTERVENTIONS:  - Assess and monitor for signs and symptoms of infection  - Monitor lab/diagnostic results  - Monitor all insertion sites, i.e. indwelling lines, tubes, and drains  - Monitor endotracheal if appropriate and nasal secretions for changes in amount and color  - [place] appropriate cooling/warming therapies per order  - Administer medications as ordered  - Instruct and encourage patient and family to use good hand hygiene technique  - Identify and instruct in appropriate isolation precautions for identified infection/condition  Outcome: Progressing     Problem: CARDIOVASCULAR - ADULT  Goal: Maintains optimal cardiac output and hemodynamic stability  Description: INTERVENTIONS:  - Monitor I/O, vital signs and rhythm  - Monitor for S/S and trends of decreased cardiac output  - Administer and titrate ordered vasoactive medications to optimize hemodynamic stability  - Assess quality of pulses, skin color and temperature  - Assess for signs of decreased coronary artery perfusion  - Instruct patient to report change in severity of symptoms  Outcome: Progressing     Problem: RESPIRATORY - ADULT  Goal: Achieves optimal ventilation and oxygenation  Description: INTERVENTIONS:  - Assess for changes in respiratory status  - Assess for changes in mentation and behavior  - Position to facilitate oxygenation and minimize respiratory effort  - Oxygen administered by appropriate delivery if ordered  - Initiate smoking cessation education as indicated  - Encourage broncho-pulmonary hygiene including cough, deep breathe, Incentive Spirometry  - Assess the need for suctioning and aspirate as needed  - Assess and instruct to report SOB or any respiratory difficulty  - Respiratory Therapy support as indicated  Outcome: Progressing

## 2023-07-08 NOTE — PROGRESS NOTES
Cynthia Nicholson  80 y.o.  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  Suspect source is urine, but urine culture is not back yet.  Patient with some pericholecystic fluid though and abnormal lft's, though no clinical signs of acute nika.  US has been ordered.  Suspect lft's are from shock liver, but await US.     7/7:  No fevers. WBC is down. RUQ results noted, but lft's are down, abdomen is benign, doubt acute nika. Suspect increased lft's from shock liver. UTI is growing Klebsiella and enterococcus from conde. Interestingly the blood culture identified as enterobacter by PCR, will need to wait for ID from lab prior to de-escalation abx. Would d/c flagyl though and start amoxicillin 500 mg po bid for the enterococcus. 7/8:  No fevers. WBC is down. Urine cultures + klebsiella and enterococcus. Blood is + for enterobacter, making it less likely that the bacteremia is from the enterobacter, ? Acute nika based on imaging, never had RUQ pain,. LFT's are coming down  Given clinical improvement though, would not pursue drainage at this time, as if drained will have catheter for rest of life. Will give prolonged course of oral abx. Family to understand he may need to come back.        - d/c cefepime  - start Omnicef 300 mg bid for 2 weeks  - start Flagyl 500 mg po bid for 2 weeks  - complete 5 days course of amoxicillin 500 mg tid  - monitor for toxicities while on this medications    ID to sign off, call with ?'s changes in clinical status      D/W primary team       Subjective:  No fevers. Objective:      Tc 98.0  Cor: rrr s1s2  Lungs: cta bilaterally  Abd: soft nt/nd + BS  Ext: no edema    Labs:  CBC w/diff  Recent Labs     07/08/23  0504   WBC 4.97   HGB 8.3*   HCT 26.4*   *   NEUTOPHILPCT 70   LYMPHOPCT 21   MONOPCT 6   EOSPCT 2     BMP  Recent Labs     07/08/23  0504   K 3.6      CO2 24   BUN 15   CREATININE 0.42*   CALCIUM 8.2*     CMP  Recent Labs     07/08/23  0504   K 3.6      CO2 24   BUN 15   CREATININE 0.42*   CALCIUM 8.2*   ALKPHOS 134*   *   AST 26       .labrc    Cultures:  Lab Results   Component Value Date    BLOODCX No Growth at 24 hrs. 07/06/2023    BLOODCX No Growth at 24 hrs. 07/06/2023    BLOODCX Enterobacter cloacae (A) 07/05/2023    BLOODCX Enterobacter cloacae (A) 07/05/2023     No results found for: "WOUNDCULT"  Lab Results   Component Value Date    URINECX >100,000 cfu/ml Klebsiella oxytoca (A) 07/05/2023    URINECX >100,000 cfu/ml Enterococcus faecalis (A) 07/05/2023     No results found for: "SPUTUMCULTUR"    MED: reviewed       Current Facility-Administered Medications:   •  acetaminophen (TYLENOL) tablet 650 mg, 650 mg, Oral, Q6H PRN, Vashti Egan PA-C  •  amoxicillin (AMOXIL) capsule 500 mg, 500 mg, Oral, Q8H [facility], Shannon Mckeon MD, 500 mg at 07/08/23 0518  •  atorvastatin (LIPITOR) tablet 20 mg, 20 mg, Oral, Daily With Monalisa Navarro PA-C, 20 mg at 07/07/23 1642  •  cefepime (MAXIPIME) IVPB (premix in dextrose) 2,000 mg 50 mL, 2,000 mg, Intravenous, Q12H, Shannon Mckeon MD, Last Rate: 100 mL/hr at 07/08/23 0518, 2,000 mg at 07/08/23 0518  •  enoxaparin (LOVENOX) subcutaneous injection 40 mg, 40 mg, Subcutaneous, Daily, Vashti Egan PA-C, 40 mg at 07/08/23 0845  •  finasteride (PROSCAR) tablet 5 mg, 5 mg, Oral, Daily, Elyssa Bell PA-C, 5 mg at 07/08/23 0845  •  insulin lispro (HumaLOG) 100 units/mL subcutaneous injection 1-5 Units, 1-5 Units, Subcutaneous, TID AC, 1 Units at 07/07/23 1644 **AND** Fingerstick Glucose (POCT), , , TID AC, Vashti Egan PA-C  •  insulin lispro (HumaLOG) 100 units/mL subcutaneous injection 1-5 Units, 1-5 Units, Subcutaneous, HS, Vashti Egan PA-C, 2 Units at 07/05/23 2135  •  metoprolol succinate (TOPROL-XL) 24 hr tablet 50 mg, 50 mg, Oral, Daily, Danyelle Rinaldi PA-C, 50 mg at 07/08/23 0845  •  ondansetron (ZOFRAN) injection 4 mg, 4 mg, Intravenous, Q6H PRN, Danyelle Rinaldi PA-C  •  phenazopyridine (PYRIDIUM) tablet 100 mg, 100 mg, Oral, TID PRN, Danyelle Rinaldi PA-C  •  sodium chloride 0.9 % infusion, 75 mL/hr, Intravenous, Continuous, Danyelle Rinaldi PA-C, Last Rate: 75 mL/hr at 07/08/23 0715, 75 mL/hr at 07/08/23 0715  •  tamsulosin (FLOMAX) capsule 0.4 mg, 0.4 mg, Oral, Daily With Dinner, Niecy VILLAR PA-C, 0.4 mg at 07/07/23 1642    Principal Problem:    Sepsis ([address])  Active Problems:    Elevated troponin    Urinary tract infection    Transaminitis    Chest wall mass    Hyponatremia    L3 vertebral fracture ([address])    Acute metabolic encephalopathy    Gram-negative bacteremia      Medardo Tee MD

## 2023-07-08 NOTE — ASSESSMENT & PLAN NOTE
· With chronic Conde catheter  · Urology consulted given CT findings and concern for penile wound related to chronic conde  · UA innumerable WBC, innumerable bacteria  · Urine culture: Klebsiella oxytoca and Enterococcus species  · Continue IV cefepime and oral flagyl while admitted  · Transition to Amoxicillin, Omnicef and Flagyl for discharge

## 2023-07-08 NOTE — PLAN OF CARE
Problem: MOBILITY - ADULT  Goal: Maintain or return to baseline ADL function  Description: INTERVENTIONS:  -  Assess patient's ability to carry out ADLs; assess patient's baseline for ADL function and identify physical deficits which impact ability to perform ADLs (bathing, care of mouth/teeth, toileting, grooming, dressing, etc.)  - Assess/evaluate cause of self-care deficits   - Assess range of motion  - Assess patient's mobility; develop plan if impaired  - Assess patient's need for assistive devices and provide as appropriate  - Encourage maximum independence but intervene and supervise when necessary  - Involve family in performance of ADLs  - Assess for home care needs following discharge   - Consider OT consult to assist with ADL evaluation and planning for discharge  - Provide patient education as appropriate  Outcome: Progressing  Goal: Maintains/Returns to pre admission functional level  Description: INTERVENTIONS:  - Perform BMAT or MOVE assessment daily.   - Set and communicate daily mobility goal to care team and patient/family/caregiver. - Collaborate with rehabilitation services on mobility goals if consulted  - Perform Range of Motion X times a day. - Reposition patient every X hours.   - Dangle patient X times a day  - Stand patient X times a day  - Ambulate patient X times a day  - Out of bed to chair X times a day   - Out of bed for meals X times a day  - Out of bed for toileting  - Record patient progress and toleration of activity level   Outcome: Progressing     Problem: Prexisting or High Potential for Compromised Skin Integrity  Goal: Skin integrity is maintained or improved  Description: INTERVENTIONS:  - Identify patients at risk for skin breakdown  - Assess and monitor skin integrity  - Assess and monitor nutrition and hydration status  - Monitor labs   - Assess for incontinence   - Turn and reposition patient  - Assist with mobility/ambulation  - Relieve pressure over bony prominences  - Avoid friction and shearing  - Provide appropriate hygiene as needed including keeping skin clean and dry  - Evaluate need for skin moisturizer/barrier cream  - Collaborate with interdisciplinary team   - Patient/family teaching  - Consider wound care consult   Outcome: Progressing     Problem: PAIN - ADULT  Goal: Verbalizes/displays adequate comfort level or baseline comfort level  Description: Interventions:  - Encourage patient to monitor pain and request assistance  - Assess pain using appropriate pain scale  - Administer analgesics based on type and severity of pain and evaluate response  - Implement non-pharmacological measures as appropriate and evaluate response  - Consider cultural and social influences on pain and pain management  - Notify physician/advanced practitioner if interventions unsuccessful or patient reports new pain  Outcome: Progressing     Problem: INFECTION - ADULT  Goal: Absence or prevention of progression during hospitalization  Description: INTERVENTIONS:  - Assess and monitor for signs and symptoms of infection  - Monitor lab/diagnostic results  - Monitor all insertion sites, i.e. indwelling lines, tubes, and drains  - Monitor endotracheal if appropriate and nasal secretions for changes in amount and color  - [place] appropriate cooling/warming therapies per order  - Administer medications as ordered  - Instruct and encourage patient and family to use good hand hygiene technique  - Identify and instruct in appropriate isolation precautions for identified infection/condition  Outcome: Progressing     Problem: SAFETY ADULT  Goal: Patient will remain free of falls  Description: INTERVENTIONS:  - Educate patient/family on patient safety including physical limitations  - Instruct patient to call for assistance with activity   - Consult OT/PT to assist with strengthening/mobility   - Keep Call bell within reach  - Keep bed low and locked with side rails adjusted as appropriate  - Keep care items and personal belongings within reach  - Initiate and maintain comfort rounds  - Make Fall Risk Sign visible to staff  - Offer Toileting every XXX Hours, in advance of need  - Initiate/Maintain Xalarm  - Obtain necessary fall risk management equipment: X  - Apply yellow socks and bracelet for high fall risk patients  - Consider moving patient to room near nurses station  Outcome: Progressing     Problem: CARDIOVASCULAR - ADULT  Goal: Maintains optimal cardiac output and hemodynamic stability  Description: INTERVENTIONS:  - Monitor I/O, vital signs and rhythm  - Monitor for S/S and trends of decreased cardiac output  - Administer and titrate ordered vasoactive medications to optimize hemodynamic stability  - Assess quality of pulses, skin color and temperature  - Assess for signs of decreased coronary artery perfusion  - Instruct patient to report change in severity of symptoms  Outcome: Progressing     Problem: RESPIRATORY - ADULT  Goal: Achieves optimal ventilation and oxygenation  Description: INTERVENTIONS:  - Assess for changes in respiratory status  - Assess for changes in mentation and behavior  - Position to facilitate oxygenation and minimize respiratory effort  - Oxygen administered by appropriate delivery if ordered  - Initiate smoking cessation education as indicated  - Encourage broncho-pulmonary hygiene including cough, deep breathe, Incentive Spirometry  - Assess the need for suctioning and aspirate as needed  - Assess and instruct to report SOB or any respiratory difficulty  - Respiratory Therapy support as indicated  Outcome: Progressing     Problem: GENITOURINARY - ADULT  Goal: Urinary catheter remains patent  Description: INTERVENTIONS:  - Assess patency of urinary catheter  - If patient has a chronic conde, consider changing catheter if non-functioning  - Follow guidelines for intermittent irrigation of non-functioning urinary catheter  Outcome: Progressing Problem: Nutrition/Hydration-ADULT  Goal: Nutrient/Hydration intake appropriate for improving, restoring or maintaining nutritional needs  Description: Monitor and assess patient's nutrition/hydration status for malnutrition. Collaborate with interdisciplinary team and initiate plan and interventions as ordered. Monitor patient's weight and dietary intake as ordered or per policy. Utilize nutrition screening tool and intervene as necessary. Determine patient's food preferences and provide high-protein, high-caloric foods as appropriate.      INTERVENTIONS:  - Monitor oral intake, urinary output, labs, and treatment plans  - Assess nutrition and hydration status and recommend course of action  - Evaluate amount of meals eaten  - Assist patient with eating if necessary   - Allow adequate time for meals  - Recommend/ encourage appropriate diets, oral nutritional supplements, and vitamin/mineral supplements  - Order, calculate, and assess calorie counts as needed  - Recommend, monitor, and adjust tube feedings and TPN/PPN based on assessed needs  - Assess need for intravenous fluids  - Provide specific nutrition/hydration education as appropriate  - Include patient/family/caregiver in decisions related to nutrition  Outcome: Progressing     Problem: Potential for Falls  Goal: Patient will remain free of falls  Description: INTERVENTIONS:  - Educate patient/family on patient safety including physical limitations  - Instruct patient to call for assistance with activity   - Consult OT/PT to assist with strengthening/mobility   - Keep Call bell within reach  - Keep bed low and locked with side rails adjusted as appropriate  - Keep care items and personal belongings within reach  - Initiate and maintain comfort rounds  - Make Fall Risk Sign visible to staff  - Offer Toileting every X Hours, in advance of need  - Initiate/Maintain Xalarm  - Obtain necessary fall risk management equipment: X  - Apply yellow socks and bracelet for high fall risk patients  - Consider moving patient to room near nurses station  Outcome: Progressing   X

## 2023-07-08 NOTE — ASSESSMENT & PLAN NOTE
· Presented to the emergency department due to sudden weakness, lethargy  · Found to be hypotensive in the ED. Met sepsis criteria with HR > 90 and leukocytosis of 20 K suspected due to urinary tract infection  · CT chest/abdomen/pelvis with diffuse bladder wall thickening. Questionable consolidation of the left lung which could be atelectasis. Trace pericholecystic fluid -correlate for acute cholecystitis. Ulcerated irregular left chest wall mass. · Blood pressure improved after IVF resuscitation. Remains afebrile  · Leukocytosis resolved  · Procalcitonin downtrending  · Right upper quadrant ultrasound consistent with cholecystitis  · Urine culture: Positive for Klebsiella oxytoca and Enterococcus species  · Initial blood culture positive x2 for Enterobacter cloacae- prelim ID enterobacter recommended IV cefepime.     · Maintained on IV cefepime while admitted, discharged on amoxicillin, Omnicef and Flagyl  · Repeat blood cultures x2 negative x24 hours  · ID consulted - appreciate recommendations

## 2023-07-08 NOTE — NURSING NOTE
Patient's spouse noted increased weakness when attempting to get patient OOB for dressing to go home. Stated that patient usually is physically much better in the morning and sleeps all afternoon at home. She will come to get patient in the morning. IV D/[place] per order. Patient appears comfortable but reports general weakness. P.A. notified of above.

## 2023-07-08 NOTE — ASSESSMENT & PLAN NOTE
·   alk phos 123 normal T. Bili  · May be related to profound hypotension present on admission now resolved  · Right upper quadrant ultrasound: consistent with cholecystitis   · No clinical signs, surgery has signed off  · Flagyl discontinued - since resumed given culture data  · Patient will be poor candidate for [facility] tube placement or removal, treat conservatively with antibiotics per discussion with infectious disease.

## 2023-07-08 NOTE — ASSESSMENT & PLAN NOTE
· Sodium up to 136 during admission  · Initial hyponatremia likely related to hypovolemia  · Continued gentle IVF, since discontinued  · Encourage oral intake  · Trend BMP

## 2023-07-09 VITALS
HEIGHT: 68 IN | BODY MASS INDEX: 20.72 KG/M2 | RESPIRATION RATE: 20 BRPM | SYSTOLIC BLOOD PRESSURE: 148 MMHG | DIASTOLIC BLOOD PRESSURE: 68 MMHG | TEMPERATURE: 97.9 F | HEART RATE: 76 BPM | WEIGHT: 136.69 LBS | OXYGEN SATURATION: 94 %

## 2023-07-09 LAB
ALBUMIN SERPL BCP-MCNC: 2.6 G/DL (ref 3.5–5)
ALP SERPL-CCNC: 135 U/L (ref 34–104)
ALT SERPL W P-5'-P-CCNC: 85 U/L (ref 7–52)
ANION GAP SERPL CALCULATED.3IONS-SCNC: 4 MMOL/L
AST SERPL W P-5'-P-CCNC: 18 U/L (ref 13–39)
BASOPHILS # BLD AUTO: 0.02 THOUSANDS/ÂΜL (ref 0–0.1)
BASOPHILS NFR BLD AUTO: 0 % (ref 0–1)
BILIRUB SERPL-MCNC: 0.34 MG/DL (ref 0.2–1)
BUN SERPL-MCNC: 16 MG/DL (ref 5–25)
CALCIUM ALBUM COR SERPL-MCNC: 9.3 MG/DL (ref 8.3–10.1)
CALCIUM SERPL-MCNC: 8.2 MG/DL (ref 8.4–10.2)
CHLORIDE SERPL-SCNC: 106 MMOL/L (ref 96–108)
CO2 SERPL-SCNC: 25 MMOL/L (ref 21–32)
CREAT SERPL-MCNC: 0.45 MG/DL (ref 0.6–1.3)
EOSINOPHIL # BLD AUTO: 0.1 THOUSAND/ÂΜL (ref 0–0.61)
EOSINOPHIL NFR BLD AUTO: 2 % (ref 0–6)
ERYTHROCYTE [DISTWIDTH] IN BLOOD BY AUTOMATED COUNT: 15.9 % (ref 11.6–15.1)
GFR SERPL CREATININE-BSD FRML MDRD: 97 ML/MIN/1.73SQ M
GLUCOSE SERPL-MCNC: 101 MG/DL (ref 65–140)
GLUCOSE SERPL-MCNC: 173 MG/DL (ref 65–140)
GLUCOSE SERPL-MCNC: 92 MG/DL (ref 65–140)
HCT VFR BLD AUTO: 27.5 % (ref 36.5–49.3)
HGB BLD-MCNC: 8.4 G/DL (ref 12–17)
IMM GRANULOCYTES # BLD AUTO: 0.02 THOUSAND/UL (ref 0–0.2)
IMM GRANULOCYTES NFR BLD AUTO: 0 % (ref 0–2)
LYMPHOCYTES # BLD AUTO: 1.38 THOUSANDS/ÂΜL (ref 0.6–4.47)
LYMPHOCYTES NFR BLD AUTO: 23 % (ref 14–44)
MCH RBC QN AUTO: 28.5 PG (ref 26.8–34.3)
MCHC RBC AUTO-ENTMCNC: 30.5 G/DL (ref 31.4–37.4)
MCV RBC AUTO: 93 FL (ref 82–98)
MONOCYTES # BLD AUTO: 0.43 THOUSAND/ÂΜL (ref 0.17–1.22)
MONOCYTES NFR BLD AUTO: 7 % (ref 4–12)
NEUTROPHILS # BLD AUTO: 3.96 THOUSANDS/ÂΜL (ref 1.85–7.62)
NEUTS SEG NFR BLD AUTO: 68 % (ref 43–75)
NRBC BLD AUTO-RTO: 0 /100 WBCS
PLATELET # BLD AUTO: 145 THOUSANDS/UL (ref 149–390)
PMV BLD AUTO: 9.2 FL (ref 8.9–12.7)
POTASSIUM SERPL-SCNC: 3.3 MMOL/L (ref 3.5–5.3)
PROT SERPL-MCNC: 5.6 G/DL (ref 6.4–8.4)
RBC # BLD AUTO: 2.95 MILLION/UL (ref 3.88–5.62)
SODIUM SERPL-SCNC: 135 MMOL/L (ref 135–147)
WBC # BLD AUTO: 5.91 THOUSAND/UL (ref 4.31–10.16)

## 2023-07-09 PROCEDURE — 82948 REAGENT STRIP/BLOOD GLUCOSE: CPT

## 2023-07-09 PROCEDURE — 99239 HOSP IP/OBS DSCHRG MGMT >30: CPT | Performed by: PHYSICIAN ASSISTANT

## 2023-07-09 PROCEDURE — 85025 COMPLETE CBC W/AUTO DIFF WBC: CPT | Performed by: PHYSICIAN ASSISTANT

## 2023-07-09 PROCEDURE — 80053 COMPREHEN METABOLIC PANEL: CPT | Performed by: PHYSICIAN ASSISTANT

## 2023-07-09 RX ADMIN — AMOXICILLIN 500 MG: 250 CAPSULE ORAL at 05:20

## 2023-07-09 RX ADMIN — CEFEPIME HYDROCHLORIDE 2000 MG: 2 INJECTION, SOLUTION INTRAVENOUS at 05:20

## 2023-07-09 RX ADMIN — ENOXAPARIN SODIUM 40 MG: 100 INJECTION SUBCUTANEOUS at 08:07

## 2023-07-09 RX ADMIN — FINASTERIDE 5 MG: 5 TABLET, FILM COATED ORAL at 08:07

## 2023-07-09 RX ADMIN — METOPROLOL SUCCINATE 50 MG: 50 TABLET, EXTENDED RELEASE ORAL at 08:07

## 2023-07-09 NOTE — PLAN OF CARE
Problem: MOBILITY - ADULT  Goal: Maintain or return to baseline ADL function  Description: INTERVENTIONS:  -  Assess patient's ability to carry out ADLs; assess patient's baseline for ADL function and identify physical deficits which impact ability to perform ADLs (bathing, care of mouth/teeth, toileting, grooming, dressing, etc.)  - Assess/evaluate cause of self-care deficits   - Assess range of motion  - Assess patient's mobility; develop plan if impaired  - Assess patient's need for assistive devices and provide as appropriate  - Encourage maximum independence but intervene and supervise when necessary  - Involve family in performance of ADLs  - Assess for home care needs following discharge   - Consider OT consult to assist with ADL evaluation and planning for discharge  - Provide patient education as appropriate  Outcome: Progressing  Goal: Maintains/Returns to pre admission functional level  Description: INTERVENTIONS:  - Perform BMAT or MOVE assessment daily.   - Set and communicate daily mobility goal to care team and patient/family/caregiver. - Collaborate with rehabilitation services on mobility goals if consulted  - Perform Range of Motion 3 times a day. - Reposition patient every 2 hours.   - Dangle patient 3 times a day  - Stand patient 3 times a day  - Ambulate patient 3 times a day  - Out of bed to chair 3 times a day   - Out of bed for meals 3 times a day  - Out of bed for toileting  - Record patient progress and toleration of activity level   Outcome: Progressing     Problem: PAIN - ADULT  Goal: Verbalizes/displays adequate comfort level or baseline comfort level  Description: Interventions:  - Encourage patient to monitor pain and request assistance  - Assess pain using appropriate pain scale  - Administer analgesics based on type and severity of pain and evaluate response  - Implement non-pharmacological measures as appropriate and evaluate response  - Consider cultural and social influences on pain and pain management  - Notify physician/advanced practitioner if interventions unsuccessful or patient reports new pain  Outcome: Progressing     Problem: INFECTION - ADULT  Goal: Absence or prevention of progression during hospitalization  Description: INTERVENTIONS:  - Assess and monitor for signs and symptoms of infection  - Monitor lab/diagnostic results  - Monitor all insertion sites, i.e. indwelling lines, tubes, and drains  - Monitor endotracheal if appropriate and nasal secretions for changes in amount and color  - [place] appropriate cooling/warming therapies per order  - Administer medications as ordered  - Instruct and encourage patient and family to use good hand hygiene technique  - Identify and instruct in appropriate isolation precautions for identified infection/condition  Outcome: Progressing     Problem: Prexisting or High Potential for Compromised Skin Integrity  Goal: Skin integrity is maintained or improved  Description: INTERVENTIONS:  - Identify patients at risk for skin breakdown  - Assess and monitor skin integrity  - Assess and monitor nutrition and hydration status  - Monitor labs   - Assess for incontinence   - Turn and reposition patient  - Assist with mobility/ambulation  - Relieve pressure over bony prominences  - Avoid friction and shearing  - Provide appropriate hygiene as needed including keeping skin clean and dry  - Evaluate need for skin moisturizer/barrier cream  - Collaborate with interdisciplinary team   - Patient/family teaching  - Consider wound care consult   Outcome: Progressing     Problem: SAFETY ADULT  Goal: Patient will remain free of falls  Description: INTERVENTIONS:  - Educate patient/family on patient safety including physical limitations  - Instruct patient to call for assistance with activity   - Consult OT/PT to assist with strengthening/mobility   - Keep Call bell within reach  - Keep bed low and locked with side rails adjusted as appropriate  - Keep care items and personal belongings within reach  - Initiate and maintain comfort rounds  - Make Fall Risk Sign visible to staff  - Offer Toileting every 2 Hours, in advance of need  - Initiate/Maintain bed alarm  - Obtain necessary fall risk management equipment: alarm  - Apply yellow socks and bracelet for high fall risk patients  - Consider moving patient to room near nurses station  Outcome: Progressing

## 2023-07-09 NOTE — PLAN OF CARE
Problem: MOBILITY - ADULT  Goal: Maintain or return to baseline ADL function  Description: INTERVENTIONS:  -  Assess patient's ability to carry out ADLs; assess patient's baseline for ADL function and identify physical deficits which impact ability to perform ADLs (bathing, care of mouth/teeth, toileting, grooming, dressing, etc.)  - Assess/evaluate cause of self-care deficits   - Assess range of motion  - Assess patient's mobility; develop plan if impaired  - Assess patient's need for assistive devices and provide as appropriate  - Encourage maximum independence but intervene and supervise when necessary  - Involve family in performance of ADLs  - Assess for home care needs following discharge   - Consider OT consult to assist with ADL evaluation and planning for discharge  - Provide patient education as appropriate  7/9/2023 0250 by Amrik Queen RN  Outcome: Progressing  7/9/2023 0241 by Amrik Queen RN  Outcome: Progressing  Goal: Maintains/Returns to pre admission functional level  Description: INTERVENTIONS:  - Perform BMAT or MOVE assessment daily.   - Set and communicate daily mobility goal to care team and patient/family/caregiver. - Collaborate with rehabilitation services on mobility goals if consulted  - Perform Range of Motion X times a day. - Reposition patient every X hours.   - Dangle patient X times a day  - Stand patient X times a day  - Ambulate patient X times a day  - Out of bed to chair X times a day   - Out of bed for meals X times a day  - Out of bed for toileting  - Record patient progress and toleration of activity level   7/9/2023 0250 by Amrik Queen RN  Outcome: Progressing  7/9/2023 0241 by Amrik Queen RN  Outcome: Progressing     Problem: Prexisting or High Potential for Compromised Skin Integrity  Goal: Skin integrity is maintained or improved  Description: INTERVENTIONS:  - Identify patients at risk for skin breakdown  - Assess and monitor skin integrity  - Assess and monitor nutrition and hydration status  - Monitor labs   - Assess for incontinence   - Turn and reposition patient  - Assist with mobility/ambulation  - Relieve pressure over bony prominences  - Avoid friction and shearing  - Provide appropriate hygiene as needed including keeping skin clean and dry  - Evaluate need for skin moisturizer/barrier cream  - Collaborate with interdisciplinary team   - Patient/family teaching  - Consider wound care consult   7/9/2023 0250 by Lindalou Bence, RN  Outcome: Progressing  7/9/2023 0241 by Lindalou Bence, RN  Outcome: Progressing     Problem: PAIN - ADULT  Goal: Verbalizes/displays adequate comfort level or baseline comfort level  Description: Interventions:  - Encourage patient to monitor pain and request assistance  - Assess pain using appropriate pain scale  - Administer analgesics based on type and severity of pain and evaluate response  - Implement non-pharmacological measures as appropriate and evaluate response  - Consider cultural and social influences on pain and pain management  - Notify physician/advanced practitioner if interventions unsuccessful or patient reports new pain  7/9/2023 0250 by Lindalou Bence, RN  Outcome: Progressing  7/9/2023 0241 by Lindalou Bence, RN  Outcome: Progressing     Problem: INFECTION - ADULT  Goal: Absence or prevention of progression during hospitalization  Description: INTERVENTIONS:  - Assess and monitor for signs and symptoms of infection  - Monitor lab/diagnostic results  - Monitor all insertion sites, i.e. indwelling lines, tubes, and drains  - Monitor endotracheal if appropriate and nasal secretions for changes in amount and color  - [place] appropriate cooling/warming therapies per order  - Administer medications as ordered  - Instruct and encourage patient and family to use good hand hygiene technique  - Identify and instruct in appropriate isolation precautions for identified infection/condition  7/9/2023 0250 by Lindalou Bence, RN  Outcome: Progressing  7/9/2023 0241 by Shubham Heck RN  Outcome: Progressing     Problem: SAFETY ADULT  Goal: Patient will remain free of falls  Description: INTERVENTIONS:  - Educate patient/family on patient safety including physical limitations  - Instruct patient to call for assistance with activity   - Consult OT/PT to assist with strengthening/mobility   - Keep Call bell within reach  - Keep bed low and locked with side rails adjusted as appropriate  - Keep care items and personal belongings within reach  - Initiate and maintain comfort rounds  - Make Fall Risk Sign visible to staff  - Offer Toileting every X Hours, in advance of need  - Initiate/Maintain Xalarm  - Obtain necessary fall risk management equipment: X  - Apply yellow socks and bracelet for high fall risk patients  - Consider moving patient to room near nurses station  7/9/2023 0250 by Shubham Heck RN  Outcome: Progressing  7/9/2023 0241 by Shubham Heck RN  Outcome: Progressing     Problem: CARDIOVASCULAR - ADULT  Goal: Maintains optimal cardiac output and hemodynamic stability  Description: INTERVENTIONS:  - Monitor I/O, vital signs and rhythm  - Monitor for S/S and trends of decreased cardiac output  - Administer and titrate ordered vasoactive medications to optimize hemodynamic stability  - Assess quality of pulses, skin color and temperature  - Assess for signs of decreased coronary artery perfusion  - Instruct patient to report change in severity of symptoms  7/9/2023 0250 by Shubham Heck RN  Outcome: Progressing  7/9/2023 0241 by Shubham Heck RN  Outcome: Progressing     Problem: RESPIRATORY - ADULT  Goal: Achieves optimal ventilation and oxygenation  Description: INTERVENTIONS:  - Assess for changes in respiratory status  - Assess for changes in mentation and behavior  - Position to facilitate oxygenation and minimize respiratory effort  - Oxygen administered by appropriate delivery if ordered  - Initiate smoking cessation education as indicated  - Encourage broncho-pulmonary hygiene including cough, deep breathe, Incentive Spirometry  - Assess the need for suctioning and aspirate as needed  - Assess and instruct to report SOB or any respiratory difficulty  - Respiratory Therapy support as indicated  7/9/2023 0250 by Ashtyn Delgado RN  Outcome: Progressing  7/9/2023 0241 by Ashtyn Delgado RN  Outcome: Progressing     Problem: GENITOURINARY - ADULT  Goal: Urinary catheter remains patent  Description: INTERVENTIONS:  - Assess patency of urinary catheter  - If patient has a chronic conde, consider changing catheter if non-functioning  - Follow guidelines for intermittent irrigation of non-functioning urinary catheter  7/9/2023 0250 by Ashtyn Delgado RN  Outcome: Progressing  7/9/2023 0241 by Ashtyn Delgado RN  Outcome: Progressing     Problem: Nutrition/Hydration-ADULT  Goal: Nutrient/Hydration intake appropriate for improving, restoring or maintaining nutritional needs  Description: Monitor and assess patient's nutrition/hydration status for malnutrition. Collaborate with interdisciplinary team and initiate plan and interventions as ordered. Monitor patient's weight and dietary intake as ordered or per policy. Utilize nutrition screening tool and intervene as necessary. Determine patient's food preferences and provide high-protein, high-caloric foods as appropriate.      INTERVENTIONS:  - Monitor oral intake, urinary output, labs, and treatment plans  - Assess nutrition and hydration status and recommend course of action  - Evaluate amount of meals eaten  - Assist patient with eating if necessary   - Allow adequate time for meals  - Recommend/ encourage appropriate diets, oral nutritional supplements, and vitamin/mineral supplements  - Order, calculate, and assess calorie counts as needed  - Recommend, monitor, and adjust tube feedings and TPN/PPN based on assessed needs  - Assess need for intravenous fluids  - Provide specific nutrition/hydration education as appropriate  - Include patient/family/caregiver in decisions related to nutrition  7/9/2023 0250 by Luisito Monroe RN  Outcome: Progressing  7/9/2023 0241 by Luisito Monroe RN  Outcome: Progressing     Problem: Potential for Falls  Goal: Patient will remain free of falls  Description: INTERVENTIONS:  - Educate patient/family on patient safety including physical limitations  - Instruct patient to call for assistance with activity   - Consult OT/PT to assist with strengthening/mobility   - Keep Call bell within reach  - Keep bed low and locked with side rails adjusted as appropriate  - Keep care items and personal belongings within reach  - Initiate and maintain comfort rounds  - Make Fall Risk Sign visible to staff  - Offer Toileting every X Hours, in advance of need  - Initiate/Maintain Xalarm  - Obtain necessary fall risk management equipment: X  - Apply yellow socks and bracelet for high fall risk patients  - Consider moving patient to room near nurses station  7/9/2023 0250 by Luisito Monroe RN  Outcome: Progressing  7/9/2023 0241 by Luisito Monroe RN  Outcome: Progressing

## 2023-07-09 NOTE — DISCHARGE SUMMARY
[address] [facility]  Discharge- Sol Shall 11/24/1929, 80 y.o. male MRN: [id number]  Unit/Bed#: -01 Encounter: [id number]  Primary Care Provider: No primary care provider on file. Date and time admitted to hospital: 7/5/2023  8:26 AM    * Sepsis [facility])  Assessment & Plan  · Presented to the emergency department due to sudden weakness, lethargy  · Found to be hypotensive in the ED. Met sepsis criteria with HR > 90 and leukocytosis of 20 K suspected due to urinary tract infection  · CT chest/abdomen/pelvis with diffuse bladder wall thickening. Questionable consolidation of the left lung which could be atelectasis. Trace pericholecystic fluid -correlate for acute cholecystitis. Ulcerated irregular left chest wall mass. · Blood pressure improved after IVF resuscitation. Remains afebrile  · Leukocytosis resolved  · Procalcitonin downtrending  · Right upper quadrant ultrasound consistent with cholecystitis  · Urine culture: Positive for Klebsiella oxytoca and Enterococcus species  · Initial blood culture positive x2 for Enterobacter cloacae- prelim ID enterobacter recommended IV cefepime.     · Maintained on IV cefepime while admitted, discharged on amoxicillin, Omnicef and Flagyl  · Repeat blood cultures x2 negative x24 hours  · ID consulted - appreciate recommendations    Gram-negative bacteremia  Assessment & Plan  · Blood cultures positive x2 on admission for Enterobacter  · Urine positive for Klebsiella and Enterococcus  · Prelim ID panel Enterobacter  · Continue IV cefepime while admitted, can transition to amoxicillin, Flagyl and Omnicef for discharge  · Leukocytosis resolved, procalcitonin downtrending  · ID following  · Trend WBC and fever curve    Chest wall mass  Assessment & Plan  · 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 Has chronic Conde catheter  · Prior provider discussed with neurosurgery - no interventions. Baseline x-ray obtained. No indication for brace unless patient has pain/radicular sx -use for comfort only    Hyponatremia  Assessment & Plan  · Sodium up to 136 during admission  · Initial hyponatremia likely related to hypovolemia  · Continued gentle IVF, since discontinued  · Encourage oral intake  · Trend BMP    Transaminitis  Assessment & Plan  ·   alk phos 123 normal T. Bili  · May be related to profound hypotension present on admission now resolved  · Right upper quadrant ultrasound: consistent with cholecystitis   · No clinical signs, surgery has signed off  · Flagyl discontinued - since resumed given culture data  · Patient will be poor candidate for [facility] tube placement or removal, treat conservatively with antibiotics per discussion with infectious disease. Urinary tract infection  Assessment & Plan  · With chronic Conde catheter  · Urology consulted given CT findings and concern for penile wound related to chronic conde  · UA innumerable WBC, innumerable bacteria  · Urine culture: Klebsiella oxytoca and Enterococcus species  · Continue IV cefepime and oral flagyl while admitted  · Transition to Amoxicillin, Omnicef and Flagyl for discharge    Elevated troponin  Assessment & Plan  · Denies chest pain  · Likely related to hypotension, sepsis    Medical Problems     Resolved Problems  Date Reviewed: 7/8/2023          Resolved    Acute metabolic encephalopathy 7/3/7502     Resolved by  Maurisio Lazar PA-C        Discharging Physician / Practitioner: Maurisio Lazar PA-C  PCP: No primary care provider on file.   Admission Date:   Admission Orders (From admission, onward)     Ordered        07/05/23 1321  [address]  Once                      Discharge Date: 07/09/23    Consultations During Hospital Stay:  · Infectious Disease  · Palliative Care  · Urology  · General Surgery    Procedures Performed:   · None    Significant Findings / Test Results:   · CT head 7/5: No acute intracranial hemorrhage seen. No mass effect or midline shift seen  · CXR 7/5: No acute cardiopulmonary disease  · CT chest abdomen pelvis 7/5:  · Left basilar density seen which may be due to atelectasis,/consolidation Trace left effusion  · Markedly thickened urinary bladder wall, may be sequela of chronic urinary bladder obstruction from markedly enlarged prostate. Clinical correlation and urology evaluation suggested for definite characterization and need for assessment with cystoscopy  · No intra-abdominal fluid collection seen. Cholelithiasis seen. Trace pericholecystic fluid, nonspecific, correlate clinically if concern for acute cholecystitis. · Fracture of the L3 vertebra with about 50% loss of the vertebral height with sclerosis, likely due to osteoporotic compression. · Ulcerated irregular left chest wall mass involving the skin and the superficial soft tissue  · Urine culture 7/5: Positive for Klebsiella oxytoca and Enterococcus faecalis  · Blood cultures 7/5: 2 out of 2 positive for Enterobacter cloacae   · Repeat blood culture 7/6: Negative x24 hours    Incidental Findings:   · None    Test Results Pending at Discharge (will require follow up): · Final blood culture results     Outpatient Tests Requested:  · Surgical Oncology biopsy of chest mass    Complications: Ongoing inconsistencies in goals of care, patient unwilling/able to ambulate to get home on anticipated day of discharge. Family does not want rehab.     Reason for Admission: UTI    Hospital Course:   Teodoro Bray is a 80 y.o. male patient with a past medical history of recent UTI treated with Bactrim who originally presented to the hospital on 7/5/2023 due to lethargy. Patient's daughter provided history, he does have a chronic Acosta catheter in place and was recently treated with Bactrim for UTI, finishing the course of antibiotics less than a week ago. Patient appeared more lethargic in the morning. Patient became hypotensive in the ED, responded to IVF. At the time of admission mass of left chest wall was addressed, has been present for years and patient did not want to pursue diagnosis and treatment and therefore, area was not biopsied. Culture data ultimately revealed UTI as well as a bacteremia likely secondary to cholecystitis which responded to IV antibiotics. No plan for tube placement or surgical intervention at this time, treat conservatively with 2 weeks of antibiotics per discussion with ID. We will also complete course of antibiotics for treatment of UTI. During admission, patient and patient's wife decided they would actually like to pursue a biopsy. Given overall stability of mass, despite growing in size, doubt it is contributing to current fraction/admission. Patient may pursue biopsy outpatient with surgical oncology. Discharged on oral antibiotics to complete course. PT/OT recommending patient return home with family support. Hemodynamically stable at time of discharge and appropriate for outpatient follow-up. Please see above list of diagnoses and related plan for additional information. Condition at Discharge: stable    Discharge Day Visit / Exam:   Subjective: Patient feels well today, agreeable to go home. He offers no complaints and has no questions. He remains agreeable to biopsy today. Understands this will be done outpatient.   Vitals: Blood Pressure: 148/68 (07/09/23 0704)  Pulse: 76 (07/09/23 0704)  Temperature: 97.9 °F (36.6 °C) (07/09/23 0704)  Temp Source: Oral (07/07/23 1434)  Respirations: 20 (07/09/23 0704)  Height: 5' 8" (172.7 cm) (07/05/23 1612)  Weight - Scale: 62 kg (136 lb 11 oz) (07/05/23 1051)  SpO2: 94 % (07/09/23 0704)  Exam:   Physical Exam  Vitals and nursing note reviewed. Constitutional:       General: He is not in acute distress. Appearance: Normal appearance. He is well-developed. HENT:      Head: Normocephalic and atraumatic. Eyes:      General: No scleral icterus. Conjunctiva/sclera: Conjunctivae normal.   Cardiovascular:      Rate and Rhythm: Normal rate and regular rhythm. Heart sounds: No murmur heard. Pulmonary:      Effort: Pulmonary effort is normal.      Breath sounds: No wheezing, rhonchi or rales. Abdominal:      General: There is no distension. Palpations: Abdomen is soft. Skin:     General: Skin is warm and dry. Findings: Lesion present. Neurological:      General: No focal deficit present. Mental Status: He is alert. Psychiatric:         Mood and Affect: Mood normal.       Discussion with Family: Will update wife at bedside when she comes in to  patient. Discharge instructions/Information to patient and family:   See after visit summary for information provided to patient and family. Provisions for Follow-Up Care:  See after visit summary for information related to follow-up care and any pertinent home health orders. Disposition:   Home    Planned Readmission: None     Discharge Statement:  I spent 65 minutes discharging the patient. This time was spent on the day of discharge. I had direct contact with the patient on the day of discharge. Greater than 50% of the total time was spent examining patient, answering all patient questions, arranging and discussing plan of care with patient as well as directly providing post-discharge instructions. Additional time then spent on discharge activities. Discharge Medications:  See after visit summary for reconciled discharge medications provided to patient and/or family.       **Please Note: This note may have been constructed using a voice recognition system**

## 2023-07-09 NOTE — PLAN OF CARE
Problem: MOBILITY - ADULT  Goal: Maintain or return to baseline ADL function  Description: INTERVENTIONS:  -  Assess patient's ability to carry out ADLs; assess patient's baseline for ADL function and identify physical deficits which impact ability to perform ADLs (bathing, care of mouth/teeth, toileting, grooming, dressing, etc.)  - Assess/evaluate cause of self-care deficits   - Assess range of motion  - Assess patient's mobility; develop plan if impaired  - Assess patient's need for assistive devices and provide as appropriate  - Encourage maximum independence but intervene and supervise when necessary  - Involve family in performance of ADLs  - Assess for home care needs following discharge   - Consider OT consult to assist with ADL evaluation and planning for discharge  - Provide patient education as appropriate  Outcome: Progressing  Goal: Maintains/Returns to pre admission functional level  Description: INTERVENTIONS:  - Perform BMAT or MOVE assessment daily.   - Set and communicate daily mobility goal to care team and patient/family/caregiver. - Collaborate with rehabilitation services on mobility goals if consulted  - Perform Range of Motion X times a day. - Reposition patient every X hours.   - Dangle patient XX times a day  - Stand patient X times a day  - Ambulate patient X times a day  - Out of bed to chair X times a day   - Out of bed for meals X times a day  - Out of bed for toileting  - Record patient progress and toleration of activity level   Outcome: Progressing     Problem: Prexisting or High Potential for Compromised Skin Integrity  Goal: Skin integrity is maintained or improved  Description: INTERVENTIONS:  - Identify patients at risk for skin breakdown  - Assess and monitor skin integrity  - Assess and monitor nutrition and hydration status  - Monitor labs   - Assess for incontinence   - Turn and reposition patient  - Assist with mobility/ambulation  - Relieve pressure over bony prominences  - Avoid friction and shearing  - Provide appropriate hygiene as needed including keeping skin clean and dry  - Evaluate need for skin moisturizer/barrier cream  - Collaborate with interdisciplinary team   - Patient/family teaching  - Consider wound care consult   Outcome: Progressing     Problem: PAIN - ADULT  Goal: Verbalizes/displays adequate comfort level or baseline comfort level  Description: Interventions:  - Encourage patient to monitor pain and request assistance  - Assess pain using appropriate pain scale  - Administer analgesics based on type and severity of pain and evaluate response  - Implement non-pharmacological measures as appropriate and evaluate response  - Consider cultural and social influences on pain and pain management  - Notify physician/advanced practitioner if interventions unsuccessful or patient reports new pain  Outcome: Progressing     Problem: INFECTION - ADULT  Goal: Absence or prevention of progression during hospitalization  Description: INTERVENTIONS:  - Assess and monitor for signs and symptoms of infection  - Monitor lab/diagnostic results  - Monitor all insertion sites, i.e. indwelling lines, tubes, and drains  - Monitor endotracheal if appropriate and nasal secretions for changes in amount and color  - [place] appropriate cooling/warming therapies per order  - Administer medications as ordered  - Instruct and encourage patient and family to use good hand hygiene technique  - Identify and instruct in appropriate isolation precautions for identified infection/condition  Outcome: Progressing     Problem: SAFETY ADULT  Goal: Patient will remain free of falls  Description: INTERVENTIONS:  - Educate patient/family on patient safety including physical limitations  - Instruct patient to call for assistance with activity   - Consult OT/PT to assist with strengthening/mobility   - Keep Call bell within reach  - Keep bed low and locked with side rails adjusted as appropriate  - Keep care items and personal belongings within reach  - Initiate and maintain comfort rounds  - Make Fall Risk Sign visible to staff  - Offer Toileting every X Hours, in advance of need  - Initiate/Maintain Xalarm  - Obtain necessary fall risk management equipment: X  - Apply yellow socks and bracelet for high fall risk patients  - Consider moving patient to room near nurses station  Outcome: Progressing     Problem: CARDIOVASCULAR - ADULT  Goal: Maintains optimal cardiac output and hemodynamic stability  Description: INTERVENTIONS:  - Monitor I/O, vital signs and rhythm  - Monitor for S/S and trends of decreased cardiac output  - Administer and titrate ordered vasoactive medications to optimize hemodynamic stability  - Assess quality of pulses, skin color and temperature  - Assess for signs of decreased coronary artery perfusion  - Instruct patient to report change in severity of symptoms  Outcome: Progressing     Problem: RESPIRATORY - ADULT  Goal: Achieves optimal ventilation and oxygenation  Description: INTERVENTIONS:  - Assess for changes in respiratory status  - Assess for changes in mentation and behavior  - Position to facilitate oxygenation and minimize respiratory effort  - Oxygen administered by appropriate delivery if ordered  - Initiate smoking cessation education as indicated  - Encourage broncho-pulmonary hygiene including cough, deep breathe, Incentive Spirometry  - Assess the need for suctioning and aspirate as needed  - Assess and instruct to report SOB or any respiratory difficulty  - Respiratory Therapy support as indicated  Outcome: Progressing     Problem: GENITOURINARY - ADULT  Goal: Urinary catheter remains patent  Description: INTERVENTIONS:  - Assess patency of urinary catheter  - If patient has a chronic conde, consider changing catheter if non-functioning  - Follow guidelines for intermittent irrigation of non-functioning urinary catheter  Outcome: Progressing     Problem: Nutrition/Hydration-ADULT  Goal: Nutrient/Hydration intake appropriate for improving, restoring or maintaining nutritional needs  Description: Monitor and assess patient's nutrition/hydration status for malnutrition. Collaborate with interdisciplinary team and initiate plan and interventions as ordered. Monitor patient's weight and dietary intake as ordered or per policy. Utilize nutrition screening tool and intervene as necessary. Determine patient's food preferences and provide high-protein, high-caloric foods as appropriate.      INTERVENTIONS:  - Monitor oral intake, urinary output, labs, and treatment plans  - Assess nutrition and hydration status and recommend course of action  - Evaluate amount of meals eaten  - Assist patient with eating if necessary   - Allow adequate time for meals  - Recommend/ encourage appropriate diets, oral nutritional supplements, and vitamin/mineral supplements  - Order, calculate, and assess calorie counts as needed  - Recommend, monitor, and adjust tube feedings and TPN/PPN based on assessed needs  - Assess need for intravenous fluids  - Provide specific nutrition/hydration education as appropriate  - Include patient/family/caregiver in decisions related to nutrition  Outcome: Progressing     Problem: Potential for Falls  Goal: Patient will remain free of falls  Description: INTERVENTIONS:  - Educate patient/family on patient safety including physical limitations  - Instruct patient to call for assistance with activity   - Consult OT/PT to assist with strengthening/mobility   - Keep Call bell within reach  - Keep bed low and locked with side rails adjusted as appropriate  - Keep care items and personal belongings within reach  - Initiate and maintain comfort rounds  - Make Fall Risk Sign visible to staff  - Offer Toileting every X Hours, in advance of need  - Initiate/Maintain Xalarm  - Obtain necessary fall risk management equipment: X  - Apply yellow socks and bracelet for high fall risk patients  - Consider moving patient to room near nurses station  Outcome: Progressing   X

## 2023-07-09 NOTE — ASSESSMENT & PLAN NOTE
·   alk phos 123 normal T. Bili  · May be related to profound hypotension present on admission now resolved  · Right upper quadrant ultrasound: consistent with cholecystitis   · No clinical signs, surgery has signed off  · Flagyl discontinued - since resumed given culture data  · Patient will be poor candidate for [facility] tube placement or removal, treat conservatively with antibiotics per discussion with infectious disease.

## 2023-07-09 NOTE — NURSING NOTE
Patient to be discharged to home in stable condition. Instructions given to spouse who will take patient home.

## 2023-07-09 NOTE — PROGRESS NOTES
Today's progress note entered as discharge summary. Patient did not leave as patient was unable to ambulate, despite being cleared by physical therapy to return home. Wife reports this is normal behavior for the patient as he does not move much after eating lunch. Nurse had removed IV, 1 dose of IV antibiotics missed as it was anticipated that patient would return home. Family and patient are uninterested in rehab and do not see the benefit of working with physical therapy again as they would not agree to rehab regardless.

## 2023-07-10 ENCOUNTER — TELEPHONE (OUTPATIENT)
Dept: HEMATOLOGY ONCOLOGY | Facility: CLINIC | Age: 88
End: 2023-07-10

## 2023-07-10 NOTE — TELEPHONE ENCOUNTER
I called Hannah Raya in response to a referral that was received for patient to establish care with Surgical Oncology. Outreach was made to schedule a consultation. .    I left a voicemail explaining the reason for my call and advised patient to call [facility] at [phone]. Another attempt will be made to contact patient.

## 2023-07-11 ENCOUNTER — TELEPHONE (OUTPATIENT)
Dept: HEMATOLOGY ONCOLOGY | Facility: CLINIC | Age: 88
End: 2023-07-11

## 2023-07-11 LAB
BACTERIA BLD CULT: NORMAL
BACTERIA BLD CULT: NORMAL

## 2023-07-11 NOTE — TELEPHONE ENCOUNTER
I called Saqib Chinchilla in response to a referral that was received for patient to establish care with Surgical Oncology. Outreach was made to schedule a consultation. .    I left a voicemail explaining the reason for my call and advised patient to call [facility] at [phone]. Another attempt will be made to contact patient.

## 2023-09-04 PROBLEM — N39.0 URINARY TRACT INFECTION: Status: RESOLVED | Noted: 2023-07-06 | Resolved: 2023-09-04

## 2023-09-05 PROBLEM — A41.9 SEPSIS (HCC): Status: RESOLVED | Noted: 2023-07-05 | Resolved: 2023-09-05

## 2023-09-08 ENCOUNTER — APPOINTMENT (EMERGENCY)
Dept: RADIOLOGY | Facility: HOSPITAL | Age: 88
DRG: 871 | End: 2023-09-08
Payer: MEDICARE

## 2023-09-08 ENCOUNTER — APPOINTMENT (EMERGENCY)
Dept: CT IMAGING | Facility: HOSPITAL | Age: 88
DRG: 871 | End: 2023-09-08
Payer: MEDICARE

## 2023-09-08 ENCOUNTER — HOSPITAL ENCOUNTER (INPATIENT)
Facility: HOSPITAL | Age: 88
LOS: 6 days | Discharge: HOME | DRG: 871 | End: 2023-09-14
Attending: EMERGENCY MEDICINE | Admitting: INTERNAL MEDICINE
Payer: MEDICARE

## 2023-09-08 DIAGNOSIS — Z97.8 CHRONIC INDWELLING FOLEY CATHETER: ICD-10-CM

## 2023-09-08 DIAGNOSIS — J18.9 PNEUMONIA: ICD-10-CM

## 2023-09-08 DIAGNOSIS — Z71.89 GOALS OF CARE, COUNSELING/DISCUSSION: ICD-10-CM

## 2023-09-08 DIAGNOSIS — R65.20 SEVERE SEPSIS (HCC): ICD-10-CM

## 2023-09-08 DIAGNOSIS — E87.1 HYPONATREMIA: ICD-10-CM

## 2023-09-08 DIAGNOSIS — R77.8 ELEVATED TROPONIN: ICD-10-CM

## 2023-09-08 DIAGNOSIS — R22.2 CHEST WALL MASS: ICD-10-CM

## 2023-09-08 DIAGNOSIS — N39.0 UTI (URINARY TRACT INFECTION): ICD-10-CM

## 2023-09-08 DIAGNOSIS — A41.9 SEVERE SEPSIS (HCC): ICD-10-CM

## 2023-09-08 DIAGNOSIS — N48.89 PENILE EROSION: Primary | ICD-10-CM

## 2023-09-08 DIAGNOSIS — R78.81 GRAM-NEGATIVE BACTEREMIA: ICD-10-CM

## 2023-09-08 DIAGNOSIS — R22.2 MASS OF LEFT CHEST WALL: ICD-10-CM

## 2023-09-08 DIAGNOSIS — A41.9 SEPSIS (HCC): ICD-10-CM

## 2023-09-08 PROBLEM — N17.9 AKI (ACUTE KIDNEY INJURY) (HCC): Status: ACTIVE | Noted: 2023-09-08

## 2023-09-08 PROBLEM — E78.5 HLD (HYPERLIPIDEMIA): Status: ACTIVE | Noted: 2023-09-08

## 2023-09-08 PROBLEM — I10 HTN (HYPERTENSION): Status: ACTIVE | Noted: 2023-09-08

## 2023-09-08 LAB
2HR DELTA HS TROPONIN: 57 NG/L
4HR DELTA HS TROPONIN: 103 NG/L
ALBUMIN SERPL BCP-MCNC: 3.3 G/DL (ref 3.5–5)
ALP SERPL-CCNC: 58 U/L (ref 34–104)
ALT SERPL W P-5'-P-CCNC: 11 U/L (ref 7–52)
ANION GAP SERPL CALCULATED.3IONS-SCNC: 8 MMOL/L
ANION GAP SERPL CALCULATED.3IONS-SCNC: 8 MMOL/L
ANISOCYTOSIS BLD QL SMEAR: PRESENT
APTT PPP: 33 SECONDS (ref 23–37)
AST SERPL W P-5'-P-CCNC: 29 U/L (ref 13–39)
BACTERIA UR QL AUTO: ABNORMAL /HPF
BASOPHILS # BLD AUTO: 0.05 THOUSANDS/ÂΜL (ref 0–0.1)
BASOPHILS # BLD MANUAL: 0 THOUSAND/UL (ref 0–0.1)
BASOPHILS NFR BLD AUTO: 0 % (ref 0–1)
BASOPHILS NFR MAR MANUAL: 0 % (ref 0–1)
BILIRUB SERPL-MCNC: 0.81 MG/DL (ref 0.2–1)
BILIRUB UR QL STRIP: NEGATIVE
BUN SERPL-MCNC: 38 MG/DL (ref 5–25)
BUN SERPL-MCNC: 46 MG/DL (ref 5–25)
CALCIUM ALBUM COR SERPL-MCNC: 10.4 MG/DL (ref 8.3–10.1)
CALCIUM SERPL-MCNC: 8.9 MG/DL (ref 8.4–10.2)
CALCIUM SERPL-MCNC: 9.8 MG/DL (ref 8.4–10.2)
CARDIAC TROPONIN I PNL SERPL HS: 146 NG/L
CARDIAC TROPONIN I PNL SERPL HS: 172 NG/L (ref 8–18)
CARDIAC TROPONIN I PNL SERPL HS: 192 NG/L
CARDIAC TROPONIN I PNL SERPL HS: 89 NG/L
CHLORIDE SERPL-SCNC: 101 MMOL/L (ref 96–108)
CHLORIDE SERPL-SCNC: 95 MMOL/L (ref 96–108)
CLARITY UR: ABNORMAL
CO2 SERPL-SCNC: 20 MMOL/L (ref 21–32)
CO2 SERPL-SCNC: 23 MMOL/L (ref 21–32)
COLOR UR: ABNORMAL
CREAT SERPL-MCNC: 0.94 MG/DL (ref 0.6–1.3)
CREAT SERPL-MCNC: 1.2 MG/DL (ref 0.6–1.3)
EOSINOPHIL # BLD AUTO: 0 THOUSAND/ÂΜL (ref 0–0.61)
EOSINOPHIL # BLD MANUAL: 0.18 THOUSAND/UL (ref 0–0.4)
EOSINOPHIL NFR BLD AUTO: 0 % (ref 0–6)
EOSINOPHIL NFR BLD MANUAL: 1 % (ref 0–6)
ERYTHROCYTE [DISTWIDTH] IN BLOOD BY AUTOMATED COUNT: 15.6 % (ref 11.6–15.1)
ERYTHROCYTE [DISTWIDTH] IN BLOOD BY AUTOMATED COUNT: 15.6 % (ref 11.6–15.1)
FLUAV RNA RESP QL NAA+PROBE: NEGATIVE
FLUBV RNA RESP QL NAA+PROBE: NEGATIVE
GFR SERPL CREATININE-BSD FRML MDRD: 51 ML/MIN/1.73SQ M
GFR SERPL CREATININE-BSD FRML MDRD: 69 ML/MIN/1.73SQ M
GLUCOSE SERPL-MCNC: 148 MG/DL (ref 65–140)
GLUCOSE SERPL-MCNC: 163 MG/DL (ref 65–140)
GLUCOSE UR STRIP-MCNC: NEGATIVE MG/DL
HCT VFR BLD AUTO: 31.3 % (ref 36.5–49.3)
HCT VFR BLD AUTO: 35.1 % (ref 36.5–49.3)
HGB BLD-MCNC: 11.1 G/DL (ref 12–17)
HGB BLD-MCNC: 9.8 G/DL (ref 12–17)
HGB UR QL STRIP.AUTO: ABNORMAL
IMM GRANULOCYTES # BLD AUTO: 0.25 THOUSAND/UL (ref 0–0.2)
IMM GRANULOCYTES NFR BLD AUTO: 1 % (ref 0–2)
INR PPP: 1.15 (ref 0.84–1.19)
KETONES UR STRIP-MCNC: NEGATIVE MG/DL
LACTATE SERPL-SCNC: 1.9 MMOL/L (ref 0.5–2)
LACTATE SERPL-SCNC: 3 MMOL/L (ref 0.5–2)
LEUKOCYTE ESTERASE UR QL STRIP: ABNORMAL
LIPASE SERPL-CCNC: 19 U/L (ref 11–82)
LYMPHOCYTES # BLD AUTO: 0.92 THOUSAND/UL (ref 0.6–4.47)
LYMPHOCYTES # BLD AUTO: 1.5 THOUSANDS/ÂΜL (ref 0.6–4.47)
LYMPHOCYTES # BLD AUTO: 5 % (ref 14–44)
LYMPHOCYTES NFR BLD AUTO: 7 % (ref 14–44)
MAGNESIUM SERPL-MCNC: 1.9 MG/DL (ref 1.9–2.7)
MCH RBC QN AUTO: 29.1 PG (ref 26.8–34.3)
MCH RBC QN AUTO: 29.3 PG (ref 26.8–34.3)
MCHC RBC AUTO-ENTMCNC: 31.3 G/DL (ref 31.4–37.4)
MCHC RBC AUTO-ENTMCNC: 31.6 G/DL (ref 31.4–37.4)
MCV RBC AUTO: 92 FL (ref 82–98)
MCV RBC AUTO: 94 FL (ref 82–98)
MONOCYTES # BLD AUTO: 0.92 THOUSAND/UL (ref 0–1.22)
MONOCYTES # BLD AUTO: 1.24 THOUSAND/ÂΜL (ref 0.17–1.22)
MONOCYTES NFR BLD AUTO: 6 % (ref 4–12)
MONOCYTES NFR BLD: 5 % (ref 4–12)
NEUTROPHILS # BLD AUTO: 19.53 THOUSANDS/ÂΜL (ref 1.85–7.62)
NEUTROPHILS # BLD MANUAL: 16.36 THOUSAND/UL (ref 1.85–7.62)
NEUTS BAND NFR BLD MANUAL: 1 % (ref 0–8)
NEUTS SEG NFR BLD AUTO: 86 % (ref 43–75)
NEUTS SEG NFR BLD AUTO: 88 % (ref 43–75)
NITRITE UR QL STRIP: POSITIVE
NON-SQ EPI CELLS URNS QL MICRO: ABNORMAL /HPF
NRBC BLD AUTO-RTO: 0 /100 WBCS
OSMOLALITY UR: 324 MMOL/KG
PH UR STRIP.AUTO: 6 [PH]
PHOSPHATE SERPL-MCNC: 3.7 MG/DL (ref 2.3–4.1)
PLATELET # BLD AUTO: 164 THOUSANDS/UL (ref 149–390)
PLATELET # BLD AUTO: 231 THOUSANDS/UL (ref 149–390)
PLATELET BLD QL SMEAR: ADEQUATE
PMV BLD AUTO: 9.6 FL (ref 8.9–12.7)
PMV BLD AUTO: 9.9 FL (ref 8.9–12.7)
POTASSIUM SERPL-SCNC: 4.1 MMOL/L (ref 3.5–5.3)
POTASSIUM SERPL-SCNC: 5.3 MMOL/L (ref 3.5–5.3)
PROCALCITONIN SERPL-MCNC: 1.03 NG/ML
PROT SERPL-MCNC: 7.4 G/DL (ref 6.4–8.4)
PROT UR STRIP-MCNC: ABNORMAL MG/DL
PROTHROMBIN TIME: 15.5 SECONDS (ref 11.6–14.5)
RBC # BLD AUTO: 3.34 MILLION/UL (ref 3.88–5.62)
RBC # BLD AUTO: 3.81 MILLION/UL (ref 3.88–5.62)
RBC #/AREA URNS AUTO: ABNORMAL /HPF
RSV RNA RESP QL NAA+PROBE: NEGATIVE
SARS-COV-2 RNA RESP QL NAA+PROBE: NEGATIVE
SODIUM 24H UR-SCNC: 14 MOL/L
SODIUM SERPL-SCNC: 126 MMOL/L (ref 135–147)
SODIUM SERPL-SCNC: 129 MMOL/L (ref 135–147)
SP GR UR STRIP.AUTO: 1.01 (ref 1–1.03)
TOXIC GRANULES BLD QL SMEAR: PRESENT
TSH SERPL DL<=0.05 MIU/L-ACNC: 1.52 UIU/ML (ref 0.45–4.5)
UROBILINOGEN UR STRIP-ACNC: <2 MG/DL
WBC # BLD AUTO: 18.38 THOUSAND/UL (ref 4.31–10.16)
WBC # BLD AUTO: 22.57 THOUSAND/UL (ref 4.31–10.16)
WBC #/AREA URNS AUTO: ABNORMAL /HPF

## 2023-09-08 PROCEDURE — 87449 NOS EACH ORGANISM AG IA: CPT

## 2023-09-08 PROCEDURE — 81001 URINALYSIS AUTO W/SCOPE: CPT | Performed by: PHYSICIAN ASSISTANT

## 2023-09-08 PROCEDURE — 0241U HB NFCT DS VIR RESP RNA 4 TRGT: CPT | Performed by: PHYSICIAN ASSISTANT

## 2023-09-08 PROCEDURE — 83605 ASSAY OF LACTIC ACID: CPT | Performed by: PHYSICIAN ASSISTANT

## 2023-09-08 PROCEDURE — 99223 1ST HOSP IP/OBS HIGH 75: CPT | Performed by: PHYSICIAN ASSISTANT

## 2023-09-08 PROCEDURE — 99285 EMERGENCY DEPT VISIT HI MDM: CPT

## 2023-09-08 PROCEDURE — G1004 CDSM NDSC: HCPCS

## 2023-09-08 PROCEDURE — 85007 BL SMEAR W/DIFF WBC COUNT: CPT

## 2023-09-08 PROCEDURE — 84145 PROCALCITONIN (PCT): CPT | Performed by: PHYSICIAN ASSISTANT

## 2023-09-08 PROCEDURE — 70450 CT HEAD/BRAIN W/O DYE: CPT

## 2023-09-08 PROCEDURE — 87154 CUL TYP ID BLD PTHGN 6+ TRGT: CPT | Performed by: PHYSICIAN ASSISTANT

## 2023-09-08 PROCEDURE — 80053 COMPREHEN METABOLIC PANEL: CPT | Performed by: PHYSICIAN ASSISTANT

## 2023-09-08 PROCEDURE — 87086 URINE CULTURE/COLONY COUNT: CPT | Performed by: PHYSICIAN ASSISTANT

## 2023-09-08 PROCEDURE — 84443 ASSAY THYROID STIM HORMONE: CPT | Performed by: INTERNAL MEDICINE

## 2023-09-08 PROCEDURE — 85027 COMPLETE CBC AUTOMATED: CPT

## 2023-09-08 PROCEDURE — 85610 PROTHROMBIN TIME: CPT | Performed by: PHYSICIAN ASSISTANT

## 2023-09-08 PROCEDURE — 93005 ELECTROCARDIOGRAM TRACING: CPT

## 2023-09-08 PROCEDURE — 87205 SMEAR GRAM STAIN: CPT

## 2023-09-08 PROCEDURE — 84100 ASSAY OF PHOSPHORUS: CPT

## 2023-09-08 PROCEDURE — 84484 ASSAY OF TROPONIN QUANT: CPT

## 2023-09-08 PROCEDURE — 74177 CT ABD & PELVIS W/CONTRAST: CPT

## 2023-09-08 PROCEDURE — 85025 COMPLETE CBC W/AUTO DIFF WBC: CPT | Performed by: PHYSICIAN ASSISTANT

## 2023-09-08 PROCEDURE — 71260 CT THORAX DX C+: CPT

## 2023-09-08 PROCEDURE — 99291 CRITICAL CARE FIRST HOUR: CPT | Performed by: PHYSICIAN ASSISTANT

## 2023-09-08 PROCEDURE — 80048 BASIC METABOLIC PNL TOTAL CA: CPT

## 2023-09-08 PROCEDURE — 83935 ASSAY OF URINE OSMOLALITY: CPT

## 2023-09-08 PROCEDURE — 87077 CULTURE AEROBIC IDENTIFY: CPT | Performed by: PHYSICIAN ASSISTANT

## 2023-09-08 PROCEDURE — 84300 ASSAY OF URINE SODIUM: CPT

## 2023-09-08 PROCEDURE — 82607 VITAMIN B-12: CPT | Performed by: INTERNAL MEDICINE

## 2023-09-08 PROCEDURE — 87186 SC STD MICRODIL/AGAR DIL: CPT

## 2023-09-08 PROCEDURE — 87186 SC STD MICRODIL/AGAR DIL: CPT | Performed by: PHYSICIAN ASSISTANT

## 2023-09-08 PROCEDURE — 84484 ASSAY OF TROPONIN QUANT: CPT | Performed by: PHYSICIAN ASSISTANT

## 2023-09-08 PROCEDURE — 82746 ASSAY OF FOLIC ACID SERUM: CPT | Performed by: INTERNAL MEDICINE

## 2023-09-08 PROCEDURE — 36415 COLL VENOUS BLD VENIPUNCTURE: CPT | Performed by: PHYSICIAN ASSISTANT

## 2023-09-08 PROCEDURE — 96365 THER/PROPH/DIAG IV INF INIT: CPT

## 2023-09-08 PROCEDURE — 71045 X-RAY EXAM CHEST 1 VIEW: CPT

## 2023-09-08 PROCEDURE — 99291 CRITICAL CARE FIRST HOUR: CPT | Performed by: INTERNAL MEDICINE

## 2023-09-08 PROCEDURE — 83735 ASSAY OF MAGNESIUM: CPT

## 2023-09-08 PROCEDURE — 87077 CULTURE AEROBIC IDENTIFY: CPT

## 2023-09-08 PROCEDURE — 83690 ASSAY OF LIPASE: CPT | Performed by: PHYSICIAN ASSISTANT

## 2023-09-08 PROCEDURE — 87070 CULTURE OTHR SPECIMN AEROBIC: CPT

## 2023-09-08 PROCEDURE — 87081 CULTURE SCREEN ONLY: CPT | Performed by: INTERNAL MEDICINE

## 2023-09-08 PROCEDURE — 87040 BLOOD CULTURE FOR BACTERIA: CPT | Performed by: PHYSICIAN ASSISTANT

## 2023-09-08 PROCEDURE — 85730 THROMBOPLASTIN TIME PARTIAL: CPT | Performed by: PHYSICIAN ASSISTANT

## 2023-09-08 PROCEDURE — 87147 CULTURE TYPE IMMUNOLOGIC: CPT

## 2023-09-08 RX ORDER — ASPIRIN 81 MG/1
81 TABLET, CHEWABLE ORAL DAILY
Status: DISCONTINUED | OUTPATIENT
Start: 2023-09-09 | End: 2023-09-14 | Stop reason: HOSPADM

## 2023-09-08 RX ORDER — ASPIRIN 81 MG/1
81 TABLET, CHEWABLE ORAL DAILY
Status: DISCONTINUED | OUTPATIENT
Start: 2023-09-08 | End: 2023-09-08

## 2023-09-08 RX ORDER — FINASTERIDE 5 MG/1
5 TABLET, FILM COATED ORAL DAILY
Status: DISCONTINUED | OUTPATIENT
Start: 2023-09-09 | End: 2023-09-14 | Stop reason: HOSPADM

## 2023-09-08 RX ORDER — HEPARIN SODIUM 5000 [USP'U]/ML
5000 INJECTION, SOLUTION INTRAVENOUS; SUBCUTANEOUS EVERY 8 HOURS SCHEDULED
Status: DISCONTINUED | OUTPATIENT
Start: 2023-09-08 | End: 2023-09-14 | Stop reason: HOSPADM

## 2023-09-08 RX ORDER — CEFEPIME HYDROCHLORIDE 2 G/50ML
2000 INJECTION, SOLUTION INTRAVENOUS EVERY 12 HOURS
Status: DISCONTINUED | OUTPATIENT
Start: 2023-09-08 | End: 2023-09-11

## 2023-09-08 RX ORDER — ATORVASTATIN CALCIUM 20 MG/1
20 TABLET, FILM COATED ORAL
Status: DISCONTINUED | OUTPATIENT
Start: 2023-09-09 | End: 2023-09-14 | Stop reason: HOSPADM

## 2023-09-08 RX ORDER — CEFEPIME HYDROCHLORIDE 2 G/1
2000 INJECTION, POWDER, FOR SOLUTION INTRAVENOUS EVERY 12 HOURS
Status: DISCONTINUED | OUTPATIENT
Start: 2023-09-08 | End: 2023-09-08

## 2023-09-08 RX ORDER — SODIUM CHLORIDE 9 MG/ML
50 INJECTION, SOLUTION INTRAVENOUS CONTINUOUS
Status: DISCONTINUED | OUTPATIENT
Start: 2023-09-08 | End: 2023-09-10

## 2023-09-08 RX ORDER — CHLORHEXIDINE GLUCONATE ORAL RINSE 1.2 MG/ML
15 SOLUTION DENTAL EVERY 12 HOURS SCHEDULED
Status: DISCONTINUED | OUTPATIENT
Start: 2023-09-08 | End: 2023-09-10

## 2023-09-08 RX ORDER — CEFEPIME HYDROCHLORIDE 2 G/50ML
2000 INJECTION, SOLUTION INTRAVENOUS ONCE
Status: COMPLETED | OUTPATIENT
Start: 2023-09-08 | End: 2023-09-08

## 2023-09-08 RX ADMIN — SODIUM CHLORIDE 1000 ML: 0.9 INJECTION, SOLUTION INTRAVENOUS at 11:00

## 2023-09-08 RX ADMIN — HEPARIN SODIUM 5000 UNITS: 5000 INJECTION INTRAVENOUS; SUBCUTANEOUS at 16:32

## 2023-09-08 RX ADMIN — CHLORHEXIDINE GLUCONATE 15 ML: 1.2 RINSE ORAL at 16:32

## 2023-09-08 RX ADMIN — VANCOMYCIN HYDROCHLORIDE 1500 MG: 1 INJECTION, POWDER, LYOPHILIZED, FOR SOLUTION INTRAVENOUS at 15:17

## 2023-09-08 RX ADMIN — CEFEPIME HYDROCHLORIDE 2000 MG: 2 INJECTION, SOLUTION INTRAVENOUS at 11:27

## 2023-09-08 RX ADMIN — SODIUM CHLORIDE 50 ML/HR: 0.9 INJECTION, SOLUTION INTRAVENOUS at 15:17

## 2023-09-08 RX ADMIN — CHLORHEXIDINE GLUCONATE 15 ML: 1.2 RINSE ORAL at 23:12

## 2023-09-08 RX ADMIN — CEFEPIME HYDROCHLORIDE 2000 MG: 2 INJECTION, SOLUTION INTRAVENOUS at 23:08

## 2023-09-08 RX ADMIN — SODIUM CHLORIDE 1000 ML: 0.9 INJECTION, SOLUTION INTRAVENOUS at 12:09

## 2023-09-08 RX ADMIN — HEPARIN SODIUM 5000 UNITS: 5000 INJECTION INTRAVENOUS; SUBCUTANEOUS at 23:08

## 2023-09-08 RX ADMIN — IOHEXOL 100 ML: 350 INJECTION, SOLUTION INTRAVENOUS at 11:52

## 2023-09-08 NOTE — ASSESSMENT & PLAN NOTE
· Troponin elevated on admission 89> 146> 192. 4 hour delta 103  · EKG SR with RBBB- unchanged from previous EKG from 07/2023  · Without complaints of CP  · Likely demand in the setting of sepsis, hypotension, [facility]    Plan:  · Continue home dose ASA  · Troponin Q4 hr until peak  · Consider cardiology consult   · Maintain telemetry monitoring  · VS per unit policy

## 2023-09-08 NOTE — ASSESSMENT & PLAN NOTE
· Per the patients wife, patient was in normal state of health last night. Patient was found unresponsive this morning. · CTH negative  · Likely in the setting of sepsis and hypotension  · Patient now more awake and alert. A/O x 2. Unable to tell time or situation. Per patients wife at bedside patient is much improved in mentation but not at baseline. · Per wife-Patient is usually a/o x 2. And unable to tell time or situation. Patient is hard of hearing at baseline. Patient has not been able to make his own medical decision for a while, and is usually unable to tell the time of the year or month. At baseline patient is able to be assisted out of bed and placed in wheelchair.      Plan:  · Neuro checks Q4 hours  · Treatment of sepsis as above  · Delirium precautions

## 2023-09-08 NOTE — ASSESSMENT & PLAN NOTE
· Serum sodium 126 on admission  · Awaiting repeat BMP  · Received 2 L 0.9% NS in the ED    Plan:  · Check urine osmo, urine sodium, serum osmo  · BMP Q8 hours- monitor and trend Na+  · Currently NPO.  Continue with 0.9% NS @ 50ml/hr  · Goal serum Na+ 132-134 in the next 24 hours

## 2023-09-08 NOTE — ED PROVIDER NOTES
History  Chief Complaint   Patient presents with   • Weakness - Generalized     Pt woke up this morning clamy, fatigue, weak. Per pt wife she thinks he has uti. Pt wouldn't open his eyes or get up at all. Patient is a 81 y/o M with h/o sepsis in July 2023 due to UTI, HTN, hyperlipidemia that was brought to the ED by wife for altered mental status. Wife states last night patient was fine, but this morning he was lethargic. He is currently sleeping on the stretcher. Patient has chronic cough. No fevers, chills. No vomiting or diarrhea. No recent head injury. History provided by:  Spouse  History limited by:  Acuity of condition      Prior to Admission Medications   Prescriptions Last Dose Informant Patient Reported? Taking?   aspirin 81 mg chewable tablet 9/8/2023  Yes Yes   Sig: Chew 81 mg daily   atorvastatin (LIPITOR) 20 mg tablet 9/8/2023  Yes Yes   Sig: Take 20 mg by mouth daily   finasteride (PROSCAR) 5 mg tablet 9/8/2023  Yes Yes   Sig: Take 5 mg by mouth daily   losartan (COZAAR) 50 mg tablet 9/8/2023  Yes Yes   Sig: Take 25 mg by mouth daily   metoprolol succinate (TOPROL-XL) 50 mg 24 hr tablet   Yes No   Sig: Take 50 mg by mouth daily   phenazopyridine (PYRIDIUM) 95 MG tablet 9/8/2023 Spouse/Significant Other Yes Yes   Sig: Take 95 mg by mouth 3 (three) times a day as needed for bladder spasms   tamsulosin (FLOMAX) 0.4 mg 9/8/2023  No Yes   Sig: Take 1 capsule (0.4 mg total) by mouth daily with dinner      Facility-Administered Medications: None       Past Medical History:   Diagnosis Date   • Acute metabolic encephalopathy 9/4/2920   • Coronary artery disease    • Diabetes mellitus ([address])    • Renal disorder        Past Surgical History:   Procedure Laterality Date   • CORONARY ANGIOPLASTY WITH STENT PLACEMENT         History reviewed. No pertinent family history. I have reviewed and agree with the history as documented.     E-Cigarette/Vaping   • E-Cigarette Use Never User E-Cigarette/Vaping Substances   • Nicotine No    • Flavoring No      Social History     Tobacco Use   • Smoking status: Former     Types: Cigarettes   • Smokeless tobacco: Never   Vaping Use   • Vaping Use: Never used   Substance Use Topics   • Alcohol use: Never   • Drug use: Never       Review of Systems   Unable to perform ROS: Acuity of condition   Constitutional: Positive for activity change and appetite change. Negative for fever. Cardiovascular: Positive for leg swelling. Gastrointestinal: Negative for diarrhea and vomiting. Skin: Negative for rash. Lesion left chest wall. Neurological: Positive for weakness. Physical Exam  Physical Exam  Vitals and nursing note reviewed. Constitutional:       General: He is in acute distress. Appearance: Normal appearance. He is well-developed and normal weight. He is ill-appearing and toxic-appearing. He is not diaphoretic. HENT:      Head: Normocephalic and atraumatic. Mouth/Throat:      Mouth: Mucous membranes are dry. Eyes:      Conjunctiva/sclera: Conjunctivae normal.      Pupils: Pupils are equal.   Cardiovascular:      Rate and Rhythm: Normal rate and regular rhythm. Heart sounds: Normal heart sounds. Pulmonary:      Breath sounds: Decreased breath sounds (due to poor respiratory effort) present. Chest:      Comments: Mass to left anterior chest wall - chronic. Abdominal:      General: Abdomen is flat. Bowel sounds are normal.      Palpations: Abdomen is soft. Tenderness: There is no abdominal tenderness. Musculoskeletal:      Cervical back: Normal range of motion and neck supple. Right lower le+ Pitting Edema present. Left lower le+ Pitting Edema present. Skin:     General: Skin is warm and dry. Coloration: Skin is pale. Neurological:      Mental Status: He is lethargic. GCS: GCS eye subscore is 3. GCS verbal subscore is 5. GCS motor subscore is 6.       Sensory: Sensation is intact.          Vital Signs  ED Triage Vitals   Temperature Pulse Respirations Blood Pressure SpO2   09/08/23 1041 09/08/23 1041 09/08/23 1041 09/08/23 1041 09/08/23 1041   97.7 °F (36.5 °C) 82 16 97/55 93 %      Temp Source Heart Rate Source Patient Position - Orthostatic VS BP Location FiO2 (%)   09/08/23 1041 09/08/23 1315 09/08/23 1315 09/08/23 1315 --   Temporal Monitor Lying Right arm       Pain Score       09/08/23 1400       No Pain           Vitals:    09/08/23 1326 09/08/23 1330 09/08/23 1345 09/08/23 1400   BP: 92/54 94/54 131/56 142/58   Pulse:  66 75 81   Patient Position - Orthostatic VS:             Visual Acuity  Visual Acuity    Flowsheet Row Most Recent Value   L Pupil Size (mm) 3   R Pupil Size (mm) 3          ED Medications  Medications   atorvastatin (LIPITOR) tablet 20 mg (has no administration in time range)   finasteride (PROSCAR) tablet 5 mg (has no administration in time range)   chlorhexidine (PERIDEX) 0.12 % oral rinse 15 mL (has no administration in time range)   sodium chloride 0.9 % infusion (has no administration in time range)   heparin (porcine) subcutaneous injection 5,000 Units (has no administration in time range)   aspirin chewable tablet 81 mg (has no administration in time range)   cefepime (MAXIPIME) IVPB (premix in dextrose) 2,000 mg 50 mL (has no administration in time range)   vancomycin (VANCOCIN) 1500 mg in sodium chloride 0.9% 250 mL IVPB (has no administration in time range)   sodium chloride 0.9 % bolus 1,000 mL (0 mL Intravenous Stopped 9/8/23 1145)   cefepime (MAXIPIME) IVPB (premix in dextrose) 2,000 mg 50 mL (0 mg Intravenous Stopped 9/8/23 1145)   sodium chloride 0.9 % bolus 1,000 mL (0 mL Intravenous Stopped 9/8/23 1239)   iohexol (OMNIPAQUE) 350 MG/ML injection (MULTI-DOSE) 100 mL (100 mL Intravenous Given 9/8/23 1152)       Diagnostic Studies  Results Reviewed     Procedure Component Value Units Date/Time    Lactic acid 2 Hours [[id number]] (Normal) Collected: 09/08/23 1333    Lab Status: Final result Specimen: Blood from Arm, Right Updated: 09/08/23 1412     LACTIC ACID 1.9 mmol/L     Narrative:      Result may be elevated if tourniquet was used during collection. HS Troponin I 2hr [[id number]]  (Abnormal) Collected: 09/08/23 1239    Lab Status: Final result Specimen: Blood from Arm, Right Updated: 09/08/23 1310     hs TnI 2hr 146 ng/L      Delta 2hr hsTnI 57 ng/L     Urine Microscopic [[id number]]  (Abnormal) Collected: 09/08/23 1237    Lab Status: Final result Specimen: Urine, Indwelling Acosta Catheter Updated: 09/08/23 1258     RBC, UA 1-2 /hpf      WBC, UA Innumerable /hpf      Epithelial Cells None Seen /hpf      Bacteria, UA Occasional /hpf     Urine culture [[id number]] Collected: 09/08/23 1237    Lab Status: In process Specimen: Urine, Indwelling Acosta Catheter Updated: 09/08/23 1258    UA w Reflex to Microscopic w Reflex to Culture [[id number]]  (Abnormal) Collected: 09/08/23 1237    Lab Status: Final result Specimen: Urine, Indwelling Acosta Catheter Updated: 09/08/23 1258     Color, UA Dark Brown     Clarity, UA Slightly Cloudy     Specific Gravity, UA 1.015     pH, UA 6.0     Leukocytes, UA Large     Nitrite, UA Positive     Protein, UA 30 (1+) mg/dl      Glucose, UA Negative mg/dl      Ketones, UA Negative mg/dl      Urobilinogen, UA <2.0 mg/dl      Bilirubin, UA Negative     Occult Blood, UA Small    HS Troponin I 4hr [[id number]]     Lab Status: No result Specimen: Blood     FLU/RSV/COVID - if FLU/RSV clinically relevant [[id number]]  (Normal) Collected: 09/08/23 1057    Lab Status: Final result Specimen: Nares from Nose Updated: 09/08/23 1159     SARS-CoV-2 Negative     INFLUENZA A PCR Negative     INFLUENZA B PCR Negative     RSV PCR Negative    Narrative:      FOR PEDIATRIC PATIENTS - copy/paste COVID Guidelines URL to browser: [email or web address]    SARS-CoV-2 assay is a Nucleic Acid Amplification assay intended for the  qualitative detection of nucleic acid from SARS-CoV-2 in nasopharyngeal  swabs. Results are for the presumptive identification of SARS-CoV-2 RNA. Positive results are indicative of infection with SARS-CoV-2, the virus  causing COVID-19, but do not rule out bacterial infection or co-infection  with other viruses. Laboratories within the [facility] and its  territories are required to report all positive results to the appropriate  public health authorities. Negative results do not preclude SARS-CoV-2  infection and should not be used as the sole basis for treatment or other  patient management decisions. Negative results must be combined with  clinical observations, patient history, and epidemiological information. This test has not been FDA cleared or approved. This test has been authorized by FDA under an Emergency Use Authorization  (EUA). This test is only authorized for the duration of time the  declaration that circumstances exist justifying the authorization of the  emergency use of an in vitro diagnostic tests for detection of SARS-CoV-2  virus and/or diagnosis of COVID-19 infection under section 564(b)(1) of  the Act, 21 U. S.C. 584AFT-9(I)(9), unless the authorization is terminated  or revoked sooner. The test has been validated but independent review by FDA  and CLIA is pending. Test performed using [employer] GeneXpert: This RT-PCR assay targets N2,  a region unique to SARS-CoV-2. A conserved region in the E-gene was chosen  for pan-Sarbecovirus detection which includes SARS-CoV-2. According to CMS-2020-01-R, this platform meets the definition of high-throughput technology.     Procalcitonin [[id number]]  (Abnormal) Collected: 09/08/23 1057    Lab Status: Final result Specimen: Blood from Arm, Right Updated: 09/08/23 1132     Procalcitonin 1.03 ng/ml     HS Troponin 0hr (reflex protocol) [[id number]]  (Abnormal) Collected: 09/08/23 1057    Lab Status: Final result Specimen: Blood from Arm, Right Updated: 09/08/23 1128     hs TnI 0hr 89 ng/L     Lactic acid [[id number]]  (Abnormal) Collected: 09/08/23 1057    Lab Status: Final result Specimen: Blood from Arm, Right Updated: 09/08/23 1126     LACTIC ACID 3.0 mmol/L     Narrative:      Result may be elevated if tourniquet was used during collection.     Comprehensive metabolic panel [[id number]]  (Abnormal) Collected: 09/08/23 1057    Lab Status: Final result Specimen: Blood from Arm, Right Updated: 09/08/23 1124     Sodium 126 mmol/L      Potassium 5.3 mmol/L      Chloride 95 mmol/L      CO2 23 mmol/L      ANION GAP 8 mmol/L      BUN 46 mg/dL      Creatinine 1.20 mg/dL      Glucose 148 mg/dL      Calcium 9.8 mg/dL      Corrected Calcium 10.4 mg/dL      AST 29 U/L      ALT 11 U/L      Alkaline Phosphatase 58 U/L      Total Protein 7.4 g/dL      Albumin 3.3 g/dL      Total Bilirubin 0.81 mg/dL      eGFR 51 ml/min/1.73sq m     Narrative:      [place] guidelines for Chronic Kidney Disease (CKD):   •  Stage 1 with normal or high GFR (GFR > 90 mL/min/1.73 square meters)  •  Stage 2 Mild CKD (GFR = 60-89 mL/min/1.73 square meters)  •  Stage 3A Moderate CKD (GFR = 45-59 mL/min/1.73 square meters)  •  Stage 3B Moderate CKD (GFR = 30-44 mL/min/1.73 square meters)  •  Stage 4 Severe CKD (GFR = 15-29 mL/min/1.73 square meters)  •  Stage 5 End Stage CKD (GFR <15 mL/min/1.73 square meters)  Note: GFR calculation is accurate only with a steady state creatinine    Lipase [[id number]]  (Normal) Collected: 09/08/23 1057    Lab Status: Final result Specimen: Blood from Arm, Right Updated: 09/08/23 1124     Lipase 19 u/L     Protime-INR [[id number]]  (Abnormal) Collected: 09/08/23 1057    Lab Status: Final result Specimen: Blood from Arm, Right Updated: 09/08/23 1121     Protime 15.5 seconds      INR 1.15    APTT [[id number]]  (Normal) Collected: 09/08/23 1057    Lab Status: Final result Specimen: Blood from Arm, Right Updated: 09/08/23 1121     PTT 33 seconds     CBC and differential [[id number]]  (Abnormal) Collected: 09/08/23 1057    Lab Status: Final result Specimen: Blood from Arm, Right Updated: 09/08/23 1106     WBC 22.57 Thousand/uL      RBC 3.81 Million/uL      Hemoglobin 11.1 g/dL      Hematocrit 35.1 %      MCV 92 fL      MCH 29.1 pg      MCHC 31.6 g/dL      RDW 15.6 %      MPV 9.9 fL      Platelets 595 Thousands/uL      nRBC 0 /100 WBCs      Neutrophils Relative 86 %      Immat GRANS % 1 %      Lymphocytes Relative 7 %      Monocytes Relative 6 %      Eosinophils Relative 0 %      Basophils Relative 0 %      Neutrophils Absolute 19.53 Thousands/µL      Immature Grans Absolute 0.25 Thousand/uL      Lymphocytes Absolute 1.50 Thousands/µL      Monocytes Absolute 1.24 Thousand/µL      Eosinophils Absolute 0.00 Thousand/µL      Basophils Absolute 0.05 Thousands/µL     Blood culture #2 [[id number]] Collected: 09/08/23 1057    Lab Status: In process Specimen: Blood from Arm, Left Updated: 09/08/23 1103    Blood culture #1 [[id number]] Collected: 09/08/23 1057    Lab Status: In process Specimen: Blood from Arm, Right Updated: 09/08/23 1103                 CT chest abdomen pelvis w contrast   Final Result by Abhinav Peguero MD (09/08 1313)   No significant interval change. Consolidation in the inferior left lower lobe, probably atelectatic but correlate to exclude symptoms of pneumonia. Trace bilateral pleural fluid   No acute intra-abdominal pathology identified. Cholelithiasis. No sonographic cholecystitis. Other chronic findings, as per the body of the report. Workstation performed: [id number]         CT head without contrast   Final Result by Abhinav Peguero MD (09/08 1228)      No acute intracranial abnormality. Involutional changes. Workstation performed: [id number]         XR chest portable   Final Result by Serena Villegas MD (09/08 1151)      No acute cardiopulmonary disease. Trace left effusion with left basilar atelectasis/scarring, similar to the CT of July 5, 2023. Resident: Danica Poole, the attending radiologist, have reviewed the images and agree with the final report above. 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      CriticalCare Time    Date/Time: 9/8/2023 3:00 PM    Performed by: Ernie Perez PA-C  Authorized by: Ernie Perez PA-C    Critical care provider statement:     Critical care time (minutes):  35    Critical care time was exclusive of:  Separately billable procedures and treating other patients    Critical care was necessary to treat or prevent imminent or life-threatening deterioration of the following conditions:  Sepsis    Critical care was time spent personally by me on the following activities:  Blood draw for specimens, obtaining history from patient or surrogate, development of treatment plan with patient or surrogate, evaluation of patient's response to treatment, examination of patient, interpretation of cardiac output measurements, ordering and performing treatments and interventions, ordering and review of laboratory studies, ordering and review of radiographic studies, re-evaluation of patient's condition and review of old charts             ED Course  ED Course as of 09/08/23 1502   Fri Sep 08, 2023   1245 Sepsis alert called    [address] [facility] will be down to see patient. Initial Sepsis Screening     Row Name 09/08/23 1246                Is the patient's history suggestive of a new or worsening infection? Yes (Proceed)  -CD        Suspected source of infection urinary tract infection  -CD        Indicate SIRS criteria Leukocytosis (WBC > 33906 IJL) OR Leukopenia (WBC <4000 IJL) OR Bandemia (WBC >10% bands); Tachycardia > 90 bpm  -CD        Are two or more of the above signs & symptoms of infection both present and new to the patient? Yes (Proceed)  -CD        Assess for evidence of organ dysfunction: Are any of the below criteria present within 6 hours of suspected infection and SIRS criteria that are NOT considered to be chronic conditions? Lactate > 2.0  -CD        Date of presentation of severe sepsis 09/08/23  -CD        Time of presentation of severe sepsis 1200  -CD        Sepsis Note: Click "NEXT" below (NOT "close") to generate sepsis note based on above information. YES (proceed by clicking "NEXT")  -CD              User Key  (r) = Recorded By, (t) = Taken By, (c) = Cosigned By    [address] [place] [address] Name Provider Type    CD Maxine Acevedo PA-C Physician Assistant              Default Flowsheet Data (last 720 hours)     Sepsis Reassess     Row Name 09/08/23 1338                   Repeat Volume Status and Tissue Perfusion Assessment Performed    Date of Reassessment: 09/08/23  -        Time of Reassessment: 1338  -CD        Sepsis Reassessment Note: Click "NEXT" below (NOT "close") to generate sepsis reassessment note. YES (proceed by clicking "NEXT")  -CD        Repeat Volume Status and Tissue Perfusion Assessment Performed --              User Key  (r) = Recorded By, (t) = Taken By, (c) = Cosigned By    [address] [place] [address] Name Provider Type    WILFREDO Acevedo PA-C Physician Assistant              SBIRT 22yo+    Flowsheet Row Most Recent Value   Initial Alcohol Screen: US AUDIT-C     1.  How often do you have a drink containing alcohol? 0 Filed at: 09/08/2023 1059   2. How many drinks containing alcohol do you have on a typical day you are drinking? 0 Filed at: 09/08/2023 1059   3a. Male UNDER 65: How often do you have five or more drinks on one occasion? 0 Filed at: 09/08/2023 1059   3b. FEMALE Any Age, or MALE 65+: How often do you have 4 or more drinks on one occassion? 0 Filed at: 09/08/2023 1059   Audit-C Score 0 Filed at: 09/08/2023 1059   GOMEZ: How many times in the past year have you. .. Used an illegal drug or used a prescription medication for non-medical reasons? Never Filed at: 09/08/2023 1059                    Medical Decision Making  Patient with AMS, will order labs, EKG, UA, CXR, CT head, CT abd and pelvis to r/o electrolyte abnormality, anemia, CHICO, brain hemorrhage, pneumonia, UTI, pyelonephritis, obstruction. Patient with sepsis due to UTI, will admit for IV abx and IV hydration. Patient with mass to left chest wall, wife states it has been there for many years. Elevated troponin: acute illness or injury  Hyponatremia: acute illness or injury  Mass of left chest wall: acute illness or injury  Pneumonia: acute illness or injury  Sepsis [facility]): acute illness or injury  UTI (urinary tract infection): acute illness or injury  Amount and/or Complexity of Data Reviewed  Independent Historian: spouse     Details: due to acuity of condition. External Data Reviewed: labs and notes. Labs: ordered. Radiology: ordered. ECG/medicine tests: ordered and independent interpretation performed. Risk  Prescription drug management. Decision regarding hospitalization.           Disposition  Final diagnoses:   UTI (urinary tract infection)   Pneumonia   Sepsis ([address])   Elevated troponin   Hyponatremia   Mass of left chest wall     Time reflects when diagnosis was documented in both MDM as applicable and the Disposition within this note     Time User Action Codes Description Comment    9/8/2023  1:50 PM Addy Alvarado Add [N39.0] UTI (urinary tract infection)     9/8/2023  1:50 PM Mat Gaster Add [J18.9] Pneumonia     9/8/2023  1:50 PM Mat Gaster Add [A41.9] Sepsis ([address])     9/8/2023  1:50 PM Mat Gaster Add [R77.8] Elevated troponin     9/8/2023  1:50 PM Mat Gaster Add [E87.1] Hyponatremia     9/8/2023  2:10 PM Mat Gaster Add [R22.2] Mass of left chest wall       ED Disposition     ED Disposition   Admit    Condition   Stable    Date/Time   Fri Sep 8, 2023  1:58 PM    Comment   Case was discussed with Jesusita Mckeon and the patient's admission status was agreed to be Admission Status: inpatient status to the service of Dr. Ijeoma Pantoja. Follow-up Information    None         Current Discharge Medication List      CONTINUE these medications which have NOT CHANGED    Details   aspirin 81 mg chewable tablet Chew 81 mg daily      atorvastatin (LIPITOR) 20 mg tablet Take 20 mg by mouth daily      finasteride (PROSCAR) 5 mg tablet Take 5 mg by mouth daily      losartan (COZAAR) 50 mg tablet Take 25 mg by mouth daily      phenazopyridine (PYRIDIUM) 95 MG tablet Take 95 mg by mouth 3 (three) times a day as needed for bladder spasms      tamsulosin (FLOMAX) 0.4 mg Take 1 capsule (0.4 mg total) by mouth daily with dinner  Qty: 30 capsule, Refills: 0    Associated Diagnoses: Urinary tract infection      metoprolol succinate (TOPROL-XL) 50 mg 24 hr tablet Take 50 mg by mouth daily             No discharge procedures on file.     PDMP Review       Value Time User    PDMP Reviewed  Yes 7/7/2023  2:19 PM Federico Jaquez PA-C          ED Provider  Electronically Signed by           Alice London PA-C  09/08/23 6180

## 2023-09-08 NOTE — PROGRESS NOTES
Dallas Webb is a 80 y.o. male who is currently ordered Vancomycin IV with management by the Pharmacy Consult service. Relevant clinical data and objective / subjective history reviewed. Vancomycin Assessment:  Indication and Goal AUC/Trough: Pneumonia (goal -600, trough >10)  Clinical Status:  new start  Micro:     Renal Function:  SCr: 1.2 mg/dL  CrCl: 32.7 mL/min  Renal replacement: Not on dialysis  Days of Therapy: 1  Current Dose: 1500 mg once  Vancomycin Plan:  New Dosin mg q24h  Estimated AUC: 459 mcg*hr/mL  Estimated Trough: 14.9 mcg/mL  Next Level: 0600 on 23   Renal Function Monitoring: Daily BMP and [place] will continue to follow closely for s/sx of nephrotoxicity, infusion reactions and appropriateness of therapy. BMP and CBC will be ordered per protocol. We will continue to follow the patient’s culture results and clinical progress daily.     Tonja Jensen, Pharmacist

## 2023-09-08 NOTE — SEPSIS NOTE
Sepsis Note   Genaro Dhillon 80 y.o. male MRN: [id number]  Unit/Bed#: ED 01 Encounter: [id number]       Initial Sepsis Screening     [address] Name 09/08/23 1246                Is the patient's history suggestive of a new or worsening infection? Yes (Proceed)  -CD        Suspected source of infection urinary tract infection  -CD        Indicate SIRS criteria Leukocytosis (WBC > 31479 IJL) OR Leukopenia (WBC <4000 IJL) OR Bandemia (WBC >10% bands); Tachycardia > 90 bpm  -CD        Are two or more of the above signs & symptoms of infection both present and new to the patient? Yes (Proceed)  -CD        Assess for evidence of organ dysfunction: Are any of the below criteria present within 6 hours of suspected infection and SIRS criteria that are NOT considered to be chronic conditions? Lactate > 2.0  -CD        Date of presentation of severe sepsis 09/08/23  -CD        Time of presentation of severe sepsis 1200  -CD        Sepsis Note: Click "NEXT" below (NOT "close") to generate sepsis note based on above information. YES (proceed by clicking "NEXT")  -CD              User Key  (r) = Recorded By, (t) = Taken By, (c) = Cosigned By    [address] [place] [address] Provider Type    CD Jose Calabrese PA-C Physician Assistant                Default Flowsheet Data (last 720 hours)     Sepsis Reassess     Row Name 09/08/23 1338                   Repeat Volume Status and Tissue Perfusion Assessment Performed    Date of Reassessment: 09/08/23  -        Time of Reassessment: 1338  -CD        Sepsis Reassessment Note: Click "NEXT" below (NOT "close") to generate sepsis reassessment note. YES (proceed by clicking "NEXT")  -CD        Repeat Volume Status and Tissue Perfusion Assessment Performed --              User Key  (r) = Recorded By, (t) = Taken By, (c) = Cosigned By    [facility] [facility] Name Provider Type    CD Jose Calabrese PA-C Physician Assistant                There is no height or weight on file to calculate BMI.   Wt Readings from Last 1 Encounters:   07/05/23 62 kg (136 lb 11 oz)        Ideal body weight: 68.4 kg (150 lb 12.7 oz)

## 2023-09-08 NOTE — ASSESSMENT & PLAN NOTE
· Patients baseline creatinine 0.4-0.6. Presents with creatinine at 1.2  · Likely pre-renal in the setting of sepsis and hypotension  · S/p 2 L IVF in the ED  · Chronic conde    Plan:  · Continue with 0.9% NS @ 50 ml/hr for hydration  · BMP Q8 hrs.  Follow and trend renal indicies  · Avoid nephrotoxic agents  · Renally dose medications

## 2023-09-08 NOTE — PLAN OF CARE
Problem: Potential for Falls  Goal: Patient will remain free of falls  Description: INTERVENTIONS:  - Educate patient/family on patient safety including physical limitations  - Instruct patient to call for assistance with activity   - Consult OT/PT to assist with strengthening/mobility   - Keep Call bell within reach  - Keep bed low and locked with side rails adjusted as appropriate  - Keep care items and personal belongings within reach  - Initiate and maintain comfort rounds  - Make Fall Risk Sign visible to staff    - Apply yellow socks and bracelet for high fall risk patients  - Consider moving patient to room near nurses station  Outcome: Progressing     Problem: MOBILITY - ADULT  Goal: Maintain or return to baseline ADL function  Description: INTERVENTIONS:  -  Assess patient's ability to carry out ADLs; assess patient's baseline for ADL function and identify physical deficits which impact ability to perform ADLs (bathing, care of mouth/teeth, toileting, grooming, dressing, etc.)  - Assess/evaluate cause of self-care deficits   - Assess range of motion  - Assess patient's mobility; develop plan if impaired  - Assess patient's need for assistive devices and provide as appropriate  - Encourage maximum independence but intervene and supervise when necessary  - Involve family in performance of ADLs  - Assess for home care needs following discharge   - Consider OT consult to assist with ADL evaluation and planning for discharge  - Provide patient education as appropriate  Outcome: Progressing  Goal: Maintains/Returns to pre admission functional level  Description: INTERVENTIONS:  - Perform BMAT or MOVE assessment daily.   - Set and communicate daily mobility goal to care team and patient/family/caregiver.    - Collaborate with rehabilitation services on mobility goals if consulted    - Out of bed for toileting  - Record patient progress and toleration of activity level   Outcome: Progressing     Problem: Prexisting or High Potential for Compromised Skin Integrity  Goal: Skin integrity is maintained or improved  Description: INTERVENTIONS:  - Identify patients at risk for skin breakdown  - Assess and monitor skin integrity  - Assess and monitor nutrition and hydration status  - Monitor labs   - Assess for incontinence   - Turn and reposition patient  - Assist with mobility/ambulation  - Relieve pressure over bony prominences  - Avoid friction and shearing  - Provide appropriate hygiene as needed including keeping skin clean and dry  - Evaluate need for skin moisturizer/barrier cream  - Collaborate with interdisciplinary team   - Patient/family teaching  - Consider wound care consult   Outcome: Progressing     Problem: PAIN - ADULT  Goal: Verbalizes/displays adequate comfort level or baseline comfort level  Description: Interventions:  - Encourage patient to monitor pain and request assistance  - Assess pain using appropriate pain scale  - Administer analgesics based on type and severity of pain and evaluate response  - Implement non-pharmacological measures as appropriate and evaluate response  - Consider cultural and social influences on pain and pain management  - Notify physician/advanced practitioner if interventions unsuccessful or patient reports new pain  Outcome: Progressing     Problem: INFECTION - ADULT  Goal: Absence or prevention of progression during hospitalization  Description: INTERVENTIONS:  - Assess and monitor for signs and symptoms of infection  - Monitor lab/diagnostic results  - Monitor all insertion sites, i.e. indwelling lines, tubes, and drains  - Monitor endotracheal if appropriate and nasal secretions for changes in amount and color  - [place] appropriate cooling/warming therapies per order  - Administer medications as ordered  - Instruct and encourage patient and family to use good hand hygiene technique  - Identify and instruct in appropriate isolation precautions for identified infection/condition  Outcome: Progressing  Goal: Absence of fever/infection during neutropenic period  Description: INTERVENTIONS:  - Monitor WBC    Outcome: Progressing     Problem: SAFETY ADULT  Goal: Patient will remain free of falls  Description: INTERVENTIONS:  - Educate patient/family on patient safety including physical limitations  - Instruct patient to call for assistance with activity   - Consult OT/PT to assist with strengthening/mobility   - Keep Call bell within reach  - Keep bed low and locked with side rails adjusted as appropriate  - Keep care items and personal belongings within reach  - Initiate and maintain comfort rounds    - Apply yellow socks and bracelet for high fall risk patients  - Consider moving patient to room near nurses station  Outcome: Progressing  Goal: Maintain or return to baseline ADL function  Description: INTERVENTIONS:  -  Assess patient's ability to carry out ADLs; assess patient's baseline for ADL function and identify physical deficits which impact ability to perform ADLs (bathing, care of mouth/teeth, toileting, grooming, dressing, etc.)  - Assess/evaluate cause of self-care deficits   - Assess range of motion  - Assess patient's mobility; develop plan if impaired  - Assess patient's need for assistive devices and provide as appropriate  - Encourage maximum independence but intervene and supervise when necessary  - Involve family in performance of ADLs  - Assess for home care needs following discharge   - Consider OT consult to assist with ADL evaluation and planning for discharge  - Provide patient education as appropriate  Outcome: Progressing  Goal: Maintains/Returns to pre admission functional level  Description: INTERVENTIONS:  - Perform BMAT or MOVE assessment daily.   - Set and communicate daily mobility goal to care team and patient/family/caregiver.    - Collaborate with rehabilitation services on mobility goals if consulted    - Out of bed for toileting  - Record patient progress and toleration of activity level   Outcome: Progressing     Problem: DISCHARGE PLANNING  Goal: Discharge to home or other facility with appropriate resources  Description: INTERVENTIONS:  - Identify barriers to discharge w/patient and caregiver  - Arrange for needed discharge resources and transportation as appropriate  - Identify discharge learning needs (meds, wound care, etc.)  - Arrange for interpretive services to assist at discharge as needed  - Refer to Case Management Department for coordinating discharge planning if the patient needs post-hospital services based on physician/advanced practitioner order or complex needs related to functional status, cognitive ability, or social support system  Outcome: Progressing     Problem: Knowledge Deficit  Goal: Patient/family/caregiver demonstrates understanding of disease process, treatment plan, medications, and discharge instructions  Description: Complete learning assessment and assess knowledge base.   Interventions:  - Provide teaching at level of understanding  - Provide teaching via preferred learning methods  Outcome: Progressing

## 2023-09-08 NOTE — ASSESSMENT & PLAN NOTE
· Present on 07/2023 admission. Per wife, patient has had wound for a number of year and has not had it evaluated by a doctor. At that time the recommendation was for the patient to follow up with surgical oncology outpatient for a biopsy. Per wife- Have not been able to follow up yet.   Wound with purulent drainage (see media)    Plan:  · Follow up with wound culture results  · Wound nurse consult  · Recommended to follow up with surgical oncology outpatient for biopsy

## 2023-09-08 NOTE — H&P
[address] [facility]  H&P  Name: Tracey Araujo 80 y.o. male I MRN: [id number]  Unit/Bed#: -01 I Date of Admission: 9/8/2023   Date of Service: 9/8/2023 I Hospital Day: 0      Assessment/Plan   * Severe sepsis [facility])  Assessment & Plan  · Presented to the hospital after being found unresponsive at home. In the ED was found to have a dirty UA: + nitrate, Lg Leukocytes, innumerable WBC. CT CAP was also significant for  Left lower lobe consolidation atelectasis vs PNA. · Patient also found to have a large open wound on the anterior left chest with purulent drainage present  · Recently admitted 07/2023 for UTI/bacteremia- found to have Enterobacter cloacae bacteremia and klebsiella UTI  · Initial lactate 3.0 > 1.9, Procalcitonin 1.03. WBC 22.57  · Initially was hypotensive which improved with 2 L IVF resuscitation  · Received empiric Cefepime in the ED    Plan:  · Continue with Cefepime  · Start Vancomycin IV  · Pharmacy consult for vancomycin management  · MAP goal > 65 mmHg. BP stable currently. Can give additional fluids vs. Starting pressors if patient becomes hypotensive  · Follow up urine/blood cultures  · Check Strep pneumo/Legionella   · Culture L. Chest wound    Acute metabolic encephalopathy  Assessment & Plan  · Per the patients wife, patient was in normal state of health last night. Patient was found unresponsive this morning. · CTH negative  · Likely in the setting of sepsis and hypotension  · Patient now more awake and alert. A/O x 2. Unable to tell time or situation. Per patients wife at bedside patient is much improved in mentation but not at baseline. · Per wife-Patient is usually a/o x 2. And unable to tell time or situation. Patient is hard of hearing at baseline. Patient has not been able to make his own medical decision for a while, and is usually unable to tell the time of the year or month.  At baseline patient is able to be assisted out of bed and placed in wheelchair. Plan:  · Neuro checks Q4 hours  · Treatment of sepsis as above  · Delirium precautions     Hyponatremia  Assessment & Plan  · Serum sodium 126 on admission  · Awaiting repeat BMP  · Received 2 L 0.9% NS in the ED    Plan:  · Check urine osmo, urine sodium, serum osmo  · BMP Q8 hours- monitor and trend Na+  · Currently NPO. Continue with 0.9% NS @ 50ml/hr  · Goal serum Na+ 132-134 in the next 24 hours    CHICO (acute kidney injury) ([address])  Assessment & Plan  · Patients baseline creatinine 0.4-0.6. Presents with creatinine at 1.2  · Likely pre-renal in the setting of sepsis and hypotension  · S/p 2 L IVF in the ED  · Chronic conde    Plan:  · Continue with 0.9% NS @ 50 ml/hr for hydration  · BMP Q8 hrs. Follow and trend renal indicies  · Avoid nephrotoxic agents  · Renally dose medications    HTN (hypertension)  Assessment & Plan  · Home regimen: Losartan    Plan:  · Hold in the setting of low BP and sepsis    HLD (hyperlipidemia)  Assessment & Plan  · Continue home dose statin    Chronic indwelling Conde catheter  Assessment & Plan  · By history  · Penis with wound to posterior aspect  · Recurrent UTI's    Plan:  · Urology consult-appreciate recommendations    Chest wall mass  Assessment & Plan  · Present on 07/2023 admission. Per wife, patient has had wound for a number of year and has not had it evaluated by a doctor. At that time the recommendation was for the patient to follow up with surgical oncology outpatient for a biopsy. Per wife- Have not been able to follow up yet.   Wound with purulent drainage (see media)    Plan:  · Follow up with wound culture results  · Wound nurse consult  · Recommended to follow up with surgical oncology outpatient for biopsy       Elevated troponin  Assessment & Plan  · Troponin elevated on admission 89> 146> 192. 4 hour delta 103  · EKG SR with RBBB- unchanged from previous EKG from 07/2023  · Without complaints of CP  · Likely demand in the setting of sepsis, hypotension, CHICO    Plan:  · Continue home dose ASA  · Troponin Q4 hr until peak  · Consider cardiology consult   · Maintain telemetry monitoring  · VS per unit policy           History of Present Illness     HPI: Ed Simon is a 80 y.o. Male with a PMHx significant for HLD, HTN, Chronic conde, recent admission for UTI/bacteremia 07/2023, known chest wound who presented to the ED 9/8/2023 after being found unresponsive by wife this morning. Per the wife, the patient was in their normal state of health the previous evening. This morning, the wife was unable to wake her . Patient's wife was concerned and called EMS to bring patient into the ED for further evaluation. In the ED the patient was hypotensive with SBP in the 70's. On preliminary evaluation, patient had an elevated lactate of 3.0, leukocytosis of 22, elevated procal. UA was indicative of UTI- + Nitrate, large leukocytes, innumerable WBC. CT CAP showed LLL consolidation- atelectasis vs PNA. Patient also presents with multiple wounds. Patient was resuscitated with 2 L IVF and was started on empiric cefepime. Initially patient was fluid responsive but patient had some border line Bps. Patients mentation improved after fluid resuscitation. Patient was admitted to the ICU under the critical care service for severe sepsis. History obtained from chart review and the patient.     Review of Systems   Unable to perform ROS: Mental status change       Historical Information   Past Medical History:  6/6/6763: Acute metabolic encephalopathy  No date: Coronary artery disease  No date: Diabetes mellitus (HCC)  No date: Renal disorder Past Surgical History:  No date: CORONARY ANGIOPLASTY WITH STENT PLACEMENT   Current Outpatient Medications   Medication Instructions   • aspirin 81 mg, Oral, Daily   • atorvastatin (LIPITOR) 20 mg, Oral, Daily   • finasteride (PROSCAR) 5 mg, Oral, Daily   • losartan (COZAAR) 25 mg, Oral, Daily   • metoprolol succinate (TOPROL-XL) 50 mg, Oral, Daily   • phenazopyridine (PYRIDIUM) 95 mg, Oral, 3 times daily PRN   • tamsulosin (FLOMAX) 0.4 mg, Oral, Daily with dinner    Allergies   Allergen Reactions   • Levaquin [Levofloxacin] Confusion      Social History     Tobacco Use   • Smoking status: Former     Types: Cigarettes   • Smokeless tobacco: Never   Vaping Use   • Vaping Use: Never used   Substance Use Topics   • Alcohol use: Never   • Drug use: Never    History reviewed. No pertinent family history. Objective                            Vitals I/O      Most Recent Min/Max in 24hrs   Temp 97.7 °F (36.5 °C) Temp  Min: 97.7 °F (36.5 °C)  Max: 97.7 °F (36.5 °C)   Pulse 81 Pulse  Min: 66  Max: 83   Resp 18 Resp  Min: 16  Max: 20   /58 BP  Min: 73/43  Max: 142/58   O2 Sat 98 % SpO2  Min: 92 %  Max: 98 %      Intake/Output Summary (Last 24 hours) at 2023 1647  Last data filed at 2023 1239  Gross per 24 hour   Intake 2030 ml   Output --   Net 2030 ml         Diet NPO; Sips with meds     Invasive Monitoring Physical exam    Physical Exam  Eyes:      General: Vision grossly intact. No scleral icterus. Extraocular Movements: Extraocular movements intact. Conjunctiva/sclera: Conjunctivae normal.      Pupils: Pupils are equal, round, and reactive to light. Skin:     General: Skin is warm and dry. Capillary Refill: Capillary refill takes less than 2 seconds. HENT:      Head: Normocephalic and atraumatic. Mouth/Throat:      Mouth: Mucous membranes are dry. Cardiovascular:      Rate and Rhythm: Normal rate and regular rhythm. Pulses: Normal pulses. Heart sounds: Normal heart sounds. Musculoskeletal:         General: Normal range of motion. Right lower le+ Edema present. Left lower le+ Edema present. Abdominal:      General: Bowel sounds are normal. There is no distension. Palpations: Abdomen is soft. Tenderness: There is no abdominal tenderness.  There is no guarding. Constitutional:       Appearance: He is malnourished. He is ill-appearing. Pulmonary:      Effort: Pulmonary effort is normal. No respiratory distress. Breath sounds: Normal breath sounds. Neurological:      Mental Status: He is lethargic and confused. GCS: GCS eye subscore is 3. GCS verbal subscore is 4. GCS motor subscore is 6. Cranial Nerves: Cranial nerves 2-12 are intact. Sensory: Sensation is intact. Corneal reflex present and cough reflex. Comments: Unable to tell situation or time. Genitourinary/Anorectal:  [place]           Diagnostic Studies      EKG: NSR w/ RBBB  Imaging:  I have personally reviewed pertinent reports.    and I have personally reviewed pertinent films in PACS     Medications:  Scheduled PRN   [START ON 9/9/2023] aspirin, 81 mg, Daily  [START ON 9/9/2023] atorvastatin, 20 mg, Daily With Dinner  cefepime, 2,000 mg, Q12H  chlorhexidine, 15 mL, Q12H [address]  [START ON 9/9/2023] finasteride, 5 mg, Daily  heparin (porcine), 5,000 Units, Q8H [address]  [START ON 9/9/2023] vancomycin, 750 mg, Q24H          Continuous    sodium chloride, 50 mL/hr, Last Rate: 50 mL/hr (09/08/23 1517)         Labs:    CBC    Recent Labs     09/08/23  1057 09/08/23  1552   WBC 22.57* 18.38*   HGB 11.1* 9.8*   HCT 35.1* 31.3*    164     BMP    Recent Labs     09/08/23  1057 09/08/23  1552   SODIUM 126* 129*   K 5.3 4.1   CL 95* 101   CO2 23 20*   AGAP 8 8   BUN 46* 38*   CREATININE 1.20 0.94   CALCIUM 9.8 8.9       Coags    Recent Labs     09/08/23  1057   INR 1.15   PTT 33        Additional Electrolytes  Recent Labs     09/08/23  1552   MG 1.9   PHOS 3.7          Blood Gas    No recent results  No recent results LFTs  Recent Labs     09/08/23  1057   ALT 11   AST 29   ALKPHOS 58   ALB 3.3*   TBILI 0.81       Infectious  Recent Labs     09/08/23  1057   PROCALCITONI 1.03*     Glucose  Recent Labs     09/08/23  1057 09/08/23  1552   GLUC 148* 163*             Critical Care Time Delivered: Upon my evaluation, this patient had a high probability of imminent or life-threatening deterioration due to Severe sepsis, CHICO, which required my direct attention, intervention, and personal management. I have personally provided 30 minutes of critical care time, exclusive of procedures, teaching, family meetings, and any prior time recorded by providers other than myself.    Anticipated Length of Stay is > 2 midnights  KAREEM Kidd

## 2023-09-08 NOTE — CONSULTS
Consultation - Urology   Blanca Flores 80 y.o. 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  -hx of markedly enlarged prostate and chronic urinary bladder obstruction and mildly thickening bladder wall  -Unclear timeline of erosion of penis, but it was present on last admission 7/6/23  -Discussed with urology [facility], patient would benefit from suprapubic catheter placement   -Discussed with patient, but was unable to reach patient's wife this evening  -Will need outpatient follow up as well for evaluation of bladder thickening with cystoscopy    UTI  -Presented with altered mental status  -Chronic indwelling conde catheter  -UA: large leukocytes, positive nitrite, 1+protein, small occult blood  -Urine micro: innumerable WBC, occas bacteria  -Continue antibiotics  -Urine cultures pending    Severe sepsis  -WBC 22.57  -Initial Lactic 3.0, 2hr improved 1.9  -Procal 1.03  -Source UTI vs LLL consolidation  -Also with large left chest wall fungating mass  -Recent admission 7/6/23 for same with enterobacter cloacae bacteremia and klebsiella UTI  -Hypotension initially that responded to IV fluids  -Continue Cefepime/vancomycin  -Blood, urine cultures pending  -trend vitals/labs    History of Present Illness   Attending: Madalyn Kelly MD  Reason for Consult / Principal Problem: wound of penis, UTI  HPI: Blanca Flores is a 80y.o. year old male with history of HLD, HTN, chronic conde, recurrent UTI, chest wall mass, who presented to the hospital after being found unresponsive by wife at home this morning. Per note, he was in baseline state of health last night. He has a chronic indwelling conde catheter and klebsiella UTI with sepsis on last admission 7/6/23. According to photos he had erosion of penis/urethra on last admission as well that appears similar to today. Unclear how long it has been present. Patient denies pain or issues with catheter, however, he is a poor historian. Information gathered from chart and primary team. Attempted calling patient's wife without answer. He was admitted with severe sepsis with UTI vs pneumonia as source, acute metabolic encephalopathy, hyponatremia, CHICO, and elevated troponin. Inpatient consult to Urology  Consult performed by: Juan Manuel Uribe PA-C  Consult ordered by: KAREEM Davis          Review of Systems   Unable to perform ROS: Mental status change   Cardiovascular: Negative for chest pain. Genitourinary: Positive for genital sores. Negative for dysuria and penile pain. Historical Information   Past Medical History:   Diagnosis Date   • Acute metabolic encephalopathy 1/3/6799   • Coronary artery disease    • Diabetes mellitus ([address])    • Renal disorder      Past Surgical History:   Procedure Laterality Date   • CORONARY ANGIOPLASTY WITH STENT PLACEMENT       Social History   Social History     Substance and Sexual Activity   Alcohol Use Never     @DRUGHX  E-Cigarette/Vaping   • E-Cigarette Use Never User      E-Cigarette/Vaping Substances   • Nicotine No    • Flavoring No      Social History     Tobacco Use   Smoking Status Former   • Types: Cigarettes   Smokeless Tobacco Never     Family History: History reviewed. No pertinent family history. Meds/Allergies   PTA meds:   Prior to Admission Medications   Prescriptions Last Dose Informant Patient Reported?  Taking?   aspirin 81 mg chewable tablet 9/8/2023  Yes Yes   Sig: Chew 81 mg daily   atorvastatin (LIPITOR) 20 mg tablet 9/8/2023  Yes Yes   Sig: Take 20 mg by mouth daily   finasteride (PROSCAR) 5 mg tablet 9/8/2023  Yes Yes   Sig: Take 5 mg by mouth daily   losartan (COZAAR) 50 mg tablet 9/8/2023  Yes Yes   Sig: Take 25 mg by mouth daily   metoprolol succinate (TOPROL-XL) 50 mg 24 hr tablet 9/7/2023  Yes Yes   Sig: Take 50 mg by mouth daily   phenazopyridine (PYRIDIUM) 95 MG tablet 9/8/2023 Spouse/Significant Other Yes Yes   Sig: Take 95 mg by mouth 3 (three) times a day as needed for bladder spasms   tamsulosin (FLOMAX) 0.4 mg 9/8/2023  No Yes   Sig: Take 1 capsule (0.4 mg total) by mouth daily with dinner      Facility-Administered Medications: None     Allergies   Allergen Reactions   • Levaquin [Levofloxacin] Confusion       Objective   Vitals: Blood pressure 142/58, pulse 81, temperature 97.7 °F (36.5 °C), temperature source Temporal, resp. rate 18, height 5' 7" (1.702 m), weight 61.2 kg (135 lb), SpO2 98 %. I/O last 24 hours: In: 2030 [IV Piggyback:2030]  Out: -     Invasive Devices     Peripheral Intravenous Line  Duration           Peripheral IV 09/08/23 Left;Ventral (anterior) Forearm <1 day          Drain  Duration           Urethral Catheter Double-lumen 16 Fr. <1 day                Physical Exam  Vitals reviewed. Constitutional:       General: He is not in acute distress. HENT:      Head: Normocephalic. Mouth/Throat:      Mouth: Mucous membranes are dry. Eyes:      Extraocular Movements: Extraocular movements intact. Cardiovascular:      Rate and Rhythm: Normal rate and regular rhythm. Heart sounds: No murmur heard. Pulmonary:      Effort: Pulmonary effort is normal. No respiratory distress. Breath sounds: Normal breath sounds. No stridor. No wheezing, rhonchi or rales. Abdominal:      General: Abdomen is flat. There is no distension. Palpations: Abdomen is soft. Tenderness: There is no abdominal tenderness. There is no guarding or rebound. Genitourinary:     Comments: Erosion of penis/urethra with small amount of white drainage surrounding catheter. No erythema or bleeding. Urine mehrdad and slightly cloudy without gross hematuria. Skin:     General: Skin is warm and dry. Findings: Lesion (left chest wall fungating mass) present. Neurological:      Mental Status: He is alert. He is disoriented.    Psychiatric:         Mood and Affect: Mood normal.             Lab Results:   I have personally reviewed pertinent reports. CBC:   Lab Results   Component Value Date    WBC 18.38 (H) 09/08/2023    HGB 9.8 (L) 09/08/2023    HCT 31.3 (L) 09/08/2023    MCV 94 09/08/2023     09/08/2023    RBC 3.34 (L) 09/08/2023    MCH 29.3 09/08/2023    MCHC 31.3 (L) 09/08/2023    RDW 15.6 (H) 09/08/2023    MPV 9.6 09/08/2023    NRBC 0 09/08/2023     CMP:   Lab Results   Component Value Date    SODIUM 129 (L) 09/08/2023     09/08/2023    CO2 20 (L) 09/08/2023    BUN 38 (H) 09/08/2023    CREATININE 0.94 09/08/2023    CALCIUM 8.9 09/08/2023    AST 29 09/08/2023    ALT 11 09/08/2023    ALKPHOS 58 09/08/2023    EGFR 69 09/08/2023     Urinalysis:   Lab Results   Component Value Date    COLORU Chris Button 09/08/2023    CLARITYU Slightly Cloudy 09/08/2023    SPECGRAV 1.015 09/08/2023    PHUR 6.0 09/08/2023    LEUKOCYTESUR Large (A) 09/08/2023    NITRITE Positive (A) 09/08/2023    GLUCOSEU Negative 09/08/2023    KETONESU Negative 09/08/2023    BILIRUBINUR Negative 09/08/2023    BLOODU Small (A) 09/08/2023     Urine Culture: No results found for: "URINECX"  Imaging Studies: I have personally reviewed pertinent reports. EKG, Pathology, and Other Studies: I have personally reviewed pertinent reports.     VTE Prophylaxis: Heparin    Code Status: Level 3 - DNAR and DNI  Advance Directive and Living Will:      Power of :    POLST:

## 2023-09-08 NOTE — ASSESSMENT & PLAN NOTE
· Presented to the hospital after being found unresponsive at home. In the ED was found to have a dirty UA: + nitrate, Lg Leukocytes, innumerable WBC. CT CAP was also significant for  Left lower lobe consolidation atelectasis vs PNA. · Patient also found to have a large open wound on the anterior left chest with purulent drainage present  · Recently admitted 07/2023 for UTI/bacteremia- found to have Enterobacter cloacae bacteremia and klebsiella UTI  · Initial lactate 3.0 > 1.9, Procalcitonin 1.03. WBC 22.57  · Initially was hypotensive which improved with 2 L IVF resuscitation  · Received empiric Cefepime in the ED    Plan:  · Continue with Cefepime  · Start Vancomycin IV  · Pharmacy consult for vancomycin management  · MAP goal > 65 mmHg. BP stable currently. Can give additional fluids vs. Starting pressors if patient becomes hypotensive  · Follow up urine/blood cultures  · Check Strep pneumo/Legionella   · Culture L.  Chest wound

## 2023-09-08 NOTE — SEPSIS NOTE
Sepsis Note   Cristina Hilton 80 y.o. male MRN: [id number]  Unit/Bed#: ED 01 Encounter: [id number]       Initial Sepsis Screening     [address] Name 09/08/23 1246                Is the patient's history suggestive of a new or worsening infection? Yes (Proceed)  -CD        Suspected source of infection urinary tract infection  -CD        Indicate SIRS criteria Leukocytosis (WBC > 67217 IJL) OR Leukopenia (WBC <4000 IJL) OR Bandemia (WBC >10% bands); Tachycardia > 90 bpm  -CD        Are two or more of the above signs & symptoms of infection both present and new to the patient? Yes (Proceed)  -CD        Assess for evidence of organ dysfunction: Are any of the below criteria present within 6 hours of suspected infection and SIRS criteria that are NOT considered to be chronic conditions? Lactate > 2.0  -CD        Date of presentation of severe sepsis 09/08/23  -CD        Time of presentation of severe sepsis 1200  -CD        Sepsis Note: Click "NEXT" below (NOT "close") to generate sepsis note based on above information. YES (proceed by clicking "NEXT")  -CD              User Key  (r) = Recorded By, (t) = Taken By, (c) = Cosigned By    [address] [place] [address] Name Provider Type    CD Huma Chavis PA-C Physician Assistant                    There is no height or weight on file to calculate BMI.   Wt Readings from Last 1 Encounters:   07/05/23 62 kg (136 lb 11 oz)        Ideal body weight: 68.4 kg (150 lb 12.7 oz)

## 2023-09-08 NOTE — ASSESSMENT & PLAN NOTE
· By history  · Penis with wound to posterior aspect  · Recurrent UTI's    Plan:  · Urology consult-appreciate recommendations

## 2023-09-09 LAB
AMMONIA PLAS-SCNC: 20 UMOL/L (ref 18–72)
ANION GAP SERPL CALCULATED.3IONS-SCNC: 3 MMOL/L
ANION GAP SERPL CALCULATED.3IONS-SCNC: 5 MMOL/L
ANISOCYTOSIS BLD QL SMEAR: PRESENT
BACTERIA UR CULT: NORMAL
BASE EX.OXY STD BLDV CALC-SCNC: 35 % (ref 60–80)
BASE EXCESS BLDV CALC-SCNC: -0.5 MMOL/L
BASOPHILS # BLD MANUAL: 0 THOUSAND/UL (ref 0–0.1)
BASOPHILS NFR MAR MANUAL: 0 % (ref 0–1)
BUN SERPL-MCNC: 27 MG/DL (ref 5–25)
BUN SERPL-MCNC: 31 MG/DL (ref 5–25)
CALCIUM SERPL-MCNC: 8.7 MG/DL (ref 8.4–10.2)
CALCIUM SERPL-MCNC: 8.9 MG/DL (ref 8.4–10.2)
CHLORIDE SERPL-SCNC: 106 MMOL/L (ref 96–108)
CHLORIDE SERPL-SCNC: 106 MMOL/L (ref 96–108)
CO2 SERPL-SCNC: 21 MMOL/L (ref 21–32)
CO2 SERPL-SCNC: 25 MMOL/L (ref 21–32)
CREAT SERPL-MCNC: 0.57 MG/DL (ref 0.6–1.3)
CREAT SERPL-MCNC: 0.67 MG/DL (ref 0.6–1.3)
E CLOAC COMP DNA BLD POS NAA+NON-PROBE: DETECTED
EOSINOPHIL # BLD MANUAL: 0.37 THOUSAND/UL (ref 0–0.4)
EOSINOPHIL NFR BLD MANUAL: 3 % (ref 0–6)
ERYTHROCYTE [DISTWIDTH] IN BLOOD BY AUTOMATED COUNT: 15.2 % (ref 11.6–15.1)
FOLATE SERPL-MCNC: 7.6 NG/ML
GFR SERPL CREATININE-BSD FRML MDRD: 82 ML/MIN/1.73SQ M
GFR SERPL CREATININE-BSD FRML MDRD: 88 ML/MIN/1.73SQ M
GLUCOSE SERPL-MCNC: 122 MG/DL (ref 65–140)
GLUCOSE SERPL-MCNC: 144 MG/DL (ref 65–140)
HCO3 BLDV-SCNC: 24.8 MMOL/L (ref 24–30)
HCT VFR BLD AUTO: 30.5 % (ref 36.5–49.3)
HGB BLD-MCNC: 9.3 G/DL (ref 12–17)
L PNEUMO1 AG UR QL IA.RAPID: NEGATIVE
LYMPHOCYTES # BLD AUTO: 1.62 THOUSAND/UL (ref 0.6–4.47)
LYMPHOCYTES # BLD AUTO: 12 % (ref 14–44)
MAGNESIUM SERPL-MCNC: 1.9 MG/DL (ref 1.9–2.7)
MCH RBC QN AUTO: 29.4 PG (ref 26.8–34.3)
MCHC RBC AUTO-ENTMCNC: 30.5 G/DL (ref 31.4–37.4)
MCV RBC AUTO: 97 FL (ref 82–98)
MONOCYTES # BLD AUTO: 0 THOUSAND/UL (ref 0–1.22)
MONOCYTES NFR BLD: 0 % (ref 4–12)
MRSA NOSE QL CULT: NORMAL
NEUTROPHILS # BLD MANUAL: 10.46 THOUSAND/UL (ref 1.85–7.62)
NEUTS BAND NFR BLD MANUAL: 1 % (ref 0–8)
NEUTS SEG NFR BLD AUTO: 83 % (ref 43–75)
O2 CT BLDV-SCNC: 4.7 ML/DL
OSMOLALITY UR/SERPL-RTO: 287 MMOL/KG (ref 282–298)
PCO2 BLDV: 43.1 MM HG (ref 42–50)
PH BLDV: 7.38 [PH] (ref 7.3–7.4)
PHOSPHATE SERPL-MCNC: 3.4 MG/DL (ref 2.3–4.1)
PLATELET # BLD AUTO: 130 THOUSANDS/UL (ref 149–390)
PLATELET BLD QL SMEAR: ADEQUATE
PMV BLD AUTO: 10.3 FL (ref 8.9–12.7)
PO2 BLDV: 22.3 MM HG (ref 35–45)
POTASSIUM SERPL-SCNC: 3.6 MMOL/L (ref 3.5–5.3)
POTASSIUM SERPL-SCNC: 4 MMOL/L (ref 3.5–5.3)
PROCALCITONIN SERPL-MCNC: 0.76 NG/ML
RBC # BLD AUTO: 3.16 MILLION/UL (ref 3.88–5.62)
S PNEUM AG UR QL: NEGATIVE
SODIUM SERPL-SCNC: 132 MMOL/L (ref 135–147)
SODIUM SERPL-SCNC: 134 MMOL/L (ref 135–147)
VANCOMYCIN SERPL-MCNC: 10.5 UG/ML (ref 10–20)
VARIANT LYMPHS # BLD AUTO: 1 %
VIT B12 SERPL-MCNC: 275 PG/ML (ref 180–914)
WBC # BLD AUTO: 12.45 THOUSAND/UL (ref 4.31–10.16)

## 2023-09-09 PROCEDURE — 83930 ASSAY OF BLOOD OSMOLALITY: CPT

## 2023-09-09 PROCEDURE — 99232 SBSQ HOSP IP/OBS MODERATE 35: CPT | Performed by: INTERNAL MEDICINE

## 2023-09-09 PROCEDURE — 82140 ASSAY OF AMMONIA: CPT | Performed by: INTERNAL MEDICINE

## 2023-09-09 PROCEDURE — 85027 COMPLETE CBC AUTOMATED: CPT

## 2023-09-09 PROCEDURE — 99223 1ST HOSP IP/OBS HIGH 75: CPT | Performed by: SURGERY

## 2023-09-09 PROCEDURE — 80202 ASSAY OF VANCOMYCIN: CPT

## 2023-09-09 PROCEDURE — 85007 BL SMEAR W/DIFF WBC COUNT: CPT

## 2023-09-09 PROCEDURE — 0HB5XZX EXCISION OF CHEST SKIN, EXTERNAL APPROACH, DIAGNOSTIC: ICD-10-PCS | Performed by: SURGERY

## 2023-09-09 PROCEDURE — 84100 ASSAY OF PHOSPHORUS: CPT

## 2023-09-09 PROCEDURE — 84145 PROCALCITONIN (PCT): CPT

## 2023-09-09 PROCEDURE — NC001 PR NO CHARGE: Performed by: PHYSICIAN ASSISTANT

## 2023-09-09 PROCEDURE — 82805 BLOOD GASES W/O2 SATURATION: CPT | Performed by: INTERNAL MEDICINE

## 2023-09-09 PROCEDURE — 80048 BASIC METABOLIC PNL TOTAL CA: CPT

## 2023-09-09 PROCEDURE — 83735 ASSAY OF MAGNESIUM: CPT

## 2023-09-09 PROCEDURE — 88305 TISSUE EXAM BY PATHOLOGIST: CPT | Performed by: PATHOLOGY

## 2023-09-09 PROCEDURE — 99232 SBSQ HOSP IP/OBS MODERATE 35: CPT | Performed by: PHYSICIAN ASSISTANT

## 2023-09-09 RX ORDER — LIDOCAINE HYDROCHLORIDE 10 MG/ML
10 INJECTION, SOLUTION EPIDURAL; INFILTRATION; INTRACAUDAL; PERINEURAL ONCE
Status: COMPLETED | OUTPATIENT
Start: 2023-09-09 | End: 2023-09-09

## 2023-09-09 RX ORDER — MAGNESIUM SULFATE HEPTAHYDRATE 40 MG/ML
2 INJECTION, SOLUTION INTRAVENOUS ONCE
Status: COMPLETED | OUTPATIENT
Start: 2023-09-09 | End: 2023-09-09

## 2023-09-09 RX ORDER — POTASSIUM CHLORIDE 14.9 MG/ML
20 INJECTION INTRAVENOUS
Status: COMPLETED | OUTPATIENT
Start: 2023-09-09 | End: 2023-09-09

## 2023-09-09 RX ADMIN — CEFEPIME HYDROCHLORIDE 2000 MG: 2 INJECTION, SOLUTION INTRAVENOUS at 13:30

## 2023-09-09 RX ADMIN — VANCOMYCIN HYDROCHLORIDE 1250 MG: 5 INJECTION, POWDER, LYOPHILIZED, FOR SOLUTION INTRAVENOUS at 08:35

## 2023-09-09 RX ADMIN — FINASTERIDE 5 MG: 5 TABLET, FILM COATED ORAL at 09:00

## 2023-09-09 RX ADMIN — LIDOCAINE HYDROCHLORIDE 3 ML: 10 INJECTION, SOLUTION EPIDURAL; INFILTRATION; INTRACAUDAL; PERINEURAL at 17:25

## 2023-09-09 RX ADMIN — CHLORHEXIDINE GLUCONATE 15 ML: 1.2 RINSE ORAL at 20:30

## 2023-09-09 RX ADMIN — POTASSIUM CHLORIDE 20 MEQ: 14.9 INJECTION, SOLUTION INTRAVENOUS at 13:00

## 2023-09-09 RX ADMIN — CHLORHEXIDINE GLUCONATE 15 ML: 1.2 RINSE ORAL at 10:00

## 2023-09-09 RX ADMIN — HEPARIN SODIUM 5000 UNITS: 5000 INJECTION INTRAVENOUS; SUBCUTANEOUS at 14:51

## 2023-09-09 RX ADMIN — ATORVASTATIN CALCIUM 20 MG: 20 TABLET, FILM COATED ORAL at 15:20

## 2023-09-09 RX ADMIN — MAGNESIUM SULFATE HEPTAHYDRATE 2 G: 2 INJECTION, SOLUTION INTRAVENOUS at 11:15

## 2023-09-09 RX ADMIN — HEPARIN SODIUM 5000 UNITS: 5000 INJECTION INTRAVENOUS; SUBCUTANEOUS at 05:17

## 2023-09-09 RX ADMIN — SODIUM CHLORIDE 50 ML/HR: 0.9 INJECTION, SOLUTION INTRAVENOUS at 11:12

## 2023-09-09 RX ADMIN — POTASSIUM CHLORIDE 20 MEQ: 14.9 INJECTION, SOLUTION INTRAVENOUS at 10:59

## 2023-09-09 RX ADMIN — SILVER NITRATE APPLICATORS 3 APPLICATOR: 25; 75 STICK TOPICAL at 17:25

## 2023-09-09 RX ADMIN — ASPIRIN 81 MG CHEWABLE TABLET 81 MG: 81 TABLET CHEWABLE at 09:00

## 2023-09-09 RX ADMIN — HEPARIN SODIUM 5000 UNITS: 5000 INJECTION INTRAVENOUS; SUBCUTANEOUS at 21:16

## 2023-09-09 NOTE — ASSESSMENT & PLAN NOTE
· Presented to the hospital after being found unresponsive at home. In the ED was found to have a dirty UA: + nitrate, Lg Leukocytes, innumerable WBC. CT CAP was also significant for  Left lower lobe consolidation atelectasis vs PNA. · Patient also found to have a large open wound on the anterior left chest with purulent drainage present  · Recently admitted 07/2023 for UTI/bacteremia- found to have Enterobacter cloacae bacteremia and klebsiella UTI  · Initial lactate 3.0 > 1.9, Procalcitonin 1.03. WBC 22.57  · Initially was hypotensive which improved with 2 L IVF resuscitation  · Received empiric Cefepime in the ED    Plan:  · Continue with Cefepime and Vancomycin IV  · Pharmacy consult for vancomycin management  · MAP goal > 65 mmHg. BP stable currently. Can give additional fluids vs. Starting pressors if patient becomes hypotensive  · Follow up urine/blood cultures  · Check Strep pneumo/Legionella   · Culture L.  Chest wound

## 2023-09-09 NOTE — CONSULTS
Consultation -General Surgery  Joel Reddy 80 y.o. male MRN: [id number]  Unit/Bed#: -01 Encounter: [id number]        Inpatient Consult to Surgical Oncology  Consult performed by: Domitila Mccarthy PA-C  Consult ordered by: Erum Erazo PA-C          ASSESSMENT/PLAN:  Medical Problems     Problem List   Left chest wall mass    * (Principal) Severe sepsis (HCC)    Elevated troponin    Transaminitis    Hyponatremia    L3 vertebral fracture (HCC)    Acute metabolic encephalopathy    Gram-negative bacteremia    CHICO (acute kidney injury) ([address])    Chronic indwelling Conde catheter    HLD (hyperlipidemia)    HTN (hypertension)      79 yo M admitted with severe sepsis, acute metabolic encephalopathy, CHICO, hyponatremia and with chronic indwelling conde known to have left chest wall mass for years. CT chest does not show evidence of abscess. Will discuss concern for mass being the root cause of sepsis. Will  biopsy per Dr Vira Figueroa. Will follow culture results. Reason for Consult / Principal Problem: L chest wall mass     HPI: Joel Reddy is admitted with severe sepsis, acute metabolic encephalopathy, CHICO, hyponatremia and with chronic indwelling conde known to have left chest wall mass for >7 years. The patient's wife would like a biopsy at this time. Patient was seen in 7/23 consult and outpatient referral was made to outpatient oncology. She was not able to make outpatient appointment. Drainage on mass was cultured on admission and is growing 1+ gram negative rods. Critical care team believes it may be the cause of his sepsis, not urine. Blood cultures are positive 2/2 for gram negative rods. Patient was on Vancomycin which was discontinued today and currently on Cefepime. Urine culture 30-39k cfu/ml with mixed contaminants. No abscess or fluid collection seen on CT. Mass extends to [facility] tissue. Patient had bacteremia in 7/23. Blood cultures at that time grew Enterobacter cloacae.     CC team notes purulent drainage from chest wall mass but wife says at times it is dry and claims it is very friable. Review of Systems   Constitutional: Positive for activity change and appetite change. Respiratory: Negative for chest tightness and shortness of breath. Gastrointestinal: Negative for abdominal distention and abdominal pain. Genitourinary: Positive for penile pain (penile erosion from chronic conde). Skin: Positive for wound (open wound left chest for >7 years). Psychiatric/Behavioral: Positive for confusion. Historical Information   Past Medical History:   Diagnosis Date   • Acute metabolic encephalopathy 0/3/3165   • Coronary artery disease    • Diabetes mellitus ([address])     Chronic conde    • Renal disorder      Past Surgical History:   Procedure Laterality Date   • CORONARY ANGIOPLASTY WITH STENT PLACEMENT       Social History   Social History     Substance and Sexual Activity   Alcohol Use Never     Social History     Substance and Sexual Activity   Drug Use Never     Social History     Tobacco Use   Smoking Status Former   • Types: Cigarettes   Smokeless Tobacco Never     History reviewed. No pertinent family history.     Meds/Allergies     Medications Prior to Admission   Medication   • aspirin 81 mg chewable tablet   • atorvastatin (LIPITOR) 20 mg tablet   • finasteride (PROSCAR) 5 mg tablet   • losartan (COZAAR) 50 mg tablet   • metoprolol succinate (TOPROL-XL) 50 mg 24 hr tablet   • phenazopyridine (PYRIDIUM) 95 MG tablet   • tamsulosin (FLOMAX) 0.4 mg     Current Facility-Administered Medications   Medication Dose Route Frequency   • aspirin chewable tablet 81 mg  81 mg Oral Daily   • atorvastatin (LIPITOR) tablet 20 mg  20 mg Oral Daily With Dinner   • cefepime (MAXIPIME) IVPB (premix in dextrose) 2,000 mg 50 mL  2,000 mg Intravenous Q12H   • chlorhexidine (PERIDEX) 0.12 % oral rinse 15 mL  15 mL Mouth/Throat Q12H [address]   • finasteride (PROSCAR) tablet 5 mg  5 mg Oral Daily • heparin (porcine) subcutaneous injection 5,000 Units  5,000 Units Subcutaneous Q8H [address]   • magnesium sulfate 2 g/50 mL IVPB (premix) 2 g  2 g Intravenous Once   • potassium chloride 20 mEq IVPB (premix)  20 mEq Intravenous Q2H   • sodium chloride 0.9 % infusion  50 mL/hr Intravenous Continuous       Allergies   Allergen Reactions   • Levaquin [Levofloxacin] Confusion       Objective     Blood pressure 135/61, pulse 75, temperature 98.8 °F (37.1 °C), resp. rate 20, height 5' 7" (1.702 m), weight 57 kg (125 lb 10.6 oz), SpO2 99 %. Intake/Output Summary (Last 24 hours) at 9/9/2023 1149  Last data filed at 9/9/2023 1100  Gross per 24 hour   Intake 2195.83 ml   Output 1165 ml   Net 1030.83 ml       PHYSICAL EXAM  General appearance: arousable , answers simple questions  Head: Normocephalic, without obvious abnormality  Heart: regular rate and rhythm, S1, S2 normal, no murmur, click, rub or gallop  Lungs: clear to auscultation bilaterally  Abdomen: soft, non-tender; bowel sounds normal; no masses,  no organomegaly   L chest wall: 8 cm firm fungating mass without erythema. No odor. +friable.  White exudate on 2/3 of surface     Lab Results:   Admission on 09/08/2023   Component Date Value   • Ventricular Rate 09/08/2023 86    • Atrial Rate 09/08/2023 86    • RI Interval 09/08/2023 324    • QRSD Interval 09/08/2023 138    • QT Interval 09/08/2023 380    • QTC Interval 09/08/2023 454    • P Axis 09/08/2023 59    • QRS Axis 09/08/2023 -69    • T Wave Axis 09/08/2023 74    • WBC 09/08/2023 22.57 (H)    • RBC 09/08/2023 3.81 (L)    • Hemoglobin 09/08/2023 11.1 (L)    • Hematocrit 09/08/2023 35.1 (L)    • MCV 09/08/2023 92    • MCH 09/08/2023 29.1    • MCHC 09/08/2023 31.6    • RDW 09/08/2023 15.6 (H)    • MPV 09/08/2023 9.9    • Platelets 59/18/6420 231    • nRBC 09/08/2023 0    • Neutrophils Relative 09/08/2023 86 (H)    • Immat GRANS % 09/08/2023 1    • Lymphocytes Relative 09/08/2023 7 (L)    • Monocytes Relative 09/08/2023 6    • Eosinophils Relative 09/08/2023 0    • Basophils Relative 09/08/2023 0    • Neutrophils Absolute 09/08/2023 19.53 (H)    • Immature Grans Absolute 09/08/2023 0.25 (H)    • Lymphocytes Absolute 09/08/2023 1.50    • Monocytes Absolute 09/08/2023 1.24 (H)    • Eosinophils Absolute 09/08/2023 0.00    • Basophils Absolute 09/08/2023 0.05    • Sodium 09/08/2023 126 (L)    • Potassium 09/08/2023 5.3    • Chloride 09/08/2023 95 (L)    • CO2 09/08/2023 23    • ANION GAP 09/08/2023 8    • BUN 09/08/2023 46 (H)    • Creatinine 09/08/2023 1.20    • Glucose 09/08/2023 148 (H)    • Calcium 09/08/2023 9.8    • Corrected Calcium 09/08/2023 10.4 (H)    • AST 09/08/2023 29    • ALT 09/08/2023 11    • Alkaline Phosphatase 09/08/2023 58    • Total Protein 09/08/2023 7.4    • Albumin 09/08/2023 3.3 (L)    • Total Bilirubin 09/08/2023 0.81    • eGFR 09/08/2023 51    • LACTIC ACID 09/08/2023 3.0 (HH)    • Procalcitonin 09/08/2023 1.03 (H)    • Protime 09/08/2023 15.5 (H)    • INR 09/08/2023 1.15    • PTT 09/08/2023 33    • Gram Stain Result 09/08/2023 Gram negative rods (A)    • Gram Stain Result 09/08/2023 Gram negative rods (A)    • Color, UA 09/08/2023 Dark Brown    • Clarity, UA 09/08/2023 Slightly Cloudy    • Specific Gravity, UA 09/08/2023 1.015    • pH, UA 09/08/2023 6.0    • Leukocytes, UA 09/08/2023 Large (A)    • Nitrite, UA 09/08/2023 Positive (A)    • Protein, UA 09/08/2023 30 (1+) (A)    • Glucose, UA 09/08/2023 Negative    • Ketones, UA 09/08/2023 Negative    • Urobilinogen, UA 09/08/2023 <2.0    • Bilirubin, UA 09/08/2023 Negative    • Occult Blood, UA 09/08/2023 Small (A)    • Lipase 09/08/2023 19    • SARS-CoV-2 09/08/2023 Negative    • INFLUENZA A PCR 09/08/2023 Negative    • INFLUENZA B PCR 09/08/2023 Negative    • RSV PCR 09/08/2023 Negative    • hs TnI 0hr 09/08/2023 89 (H)    • LACTIC ACID 09/08/2023 1.9    • hs TnI 2hr 09/08/2023 146 (H)    • Delta 2hr hsTnI 09/08/2023 57 (H)    • hs TnI 4hr 09/08/2023 192 (H)    • Delta 4hr hsTnI 09/08/2023 103 (H)    • RBC, UA 09/08/2023 1-2    • WBC, UA 09/08/2023 Innumerable (A)    • Epithelial Cells 09/08/2023 None Seen    • Bacteria, UA 09/08/2023 Occasional    • Urine Culture 09/08/2023 30,000-39,000 cfu/ml    • WBC 09/08/2023 18.38 (H)    • RBC 09/08/2023 3.34 (L)    • Hemoglobin 09/08/2023 9.8 (L)    • Hematocrit 09/08/2023 31.3 (L)    • MCV 09/08/2023 94    • MCH 09/08/2023 29.3    • MCHC 09/08/2023 31.3 (L)    • RDW 09/08/2023 15.6 (H)    • MPV 09/08/2023 9.6    • Platelets 80/78/3278 164    • Sodium 09/08/2023 129 (L)    • Potassium 09/08/2023 4.1    • Chloride 09/08/2023 101    • CO2 09/08/2023 20 (L)    • ANION GAP 09/08/2023 8    • BUN 09/08/2023 38 (H)    • Creatinine 09/08/2023 0.94    • Glucose 09/08/2023 163 (H)    • Calcium 09/08/2023 8.9    • eGFR 09/08/2023 69    • Magnesium 09/08/2023 1.9    • Phosphorus 09/08/2023 3.7    • Strep pneumoniae antigen* 09/08/2023 Negative    • Legionella Urinary Antig* 09/08/2023 Negative    • Gram Stain Result 09/08/2023 1+ Gram negative rods (A)    • Gram Stain Result 09/08/2023 No polys seen (A)    • Ventricular Rate 09/08/2023 82    • Atrial Rate 09/08/2023 67    • QRSD Interval 09/08/2023 136    • QT Interval 09/08/2023 400    • QTC Interval 09/08/2023 467    • QRS Axis 09/08/2023 -68    • T Wave Axis 09/08/2023 72    • Segmented % 09/08/2023 88 (H)    • Bands % 09/08/2023 1    • Lymphocytes % 09/08/2023 5 (L)    • Monocytes % 09/08/2023 5    • Eosinophils, % 09/08/2023 1    • Basophils % 09/08/2023 0    • Absolute Neutrophils 09/08/2023 16.36 (H)    • Lymphocytes Absolute 09/08/2023 0.92    • Monocytes Absolute 09/08/2023 0.92    • Eosinophils Absolute 09/08/2023 0.18    • Basophils Absolute 09/08/2023 0.00    • Toxic Granulation 09/08/2023 Present    • Platelet Estimate 90/24/3252 Adequate    • Anisocytosis 09/08/2023 Present    • HS TnI random 09/08/2023 172 (H)    • Sodium, Ur 09/08/2023 14    • Osmolality, Ur 09/08/2023 324    • Osmolality Serum 09/09/2023 287    • Vitamin B-12 09/08/2023 275    • Folate 09/08/2023 7.6    • TSH 3RD GENERATON 09/08/2023 1.521    • Sodium 09/09/2023 132 (L)    • Potassium 09/09/2023 4.0    • Chloride 09/09/2023 106    • CO2 09/09/2023 21    • ANION GAP 09/09/2023 5    • BUN 09/09/2023 31 (H)    • Creatinine 09/09/2023 0.67    • Glucose 09/09/2023 144 (H)    • Calcium 09/09/2023 8.7    • eGFR 09/09/2023 82    • Enterobacter cloacae com* 09/08/2023 Detected (A)    • WBC 09/09/2023 12.45 (H)    • RBC 09/09/2023 3.16 (L)    • Hemoglobin 09/09/2023 9.3 (L)    • Hematocrit 09/09/2023 30.5 (L)    • MCV 09/09/2023 97    • MCH 09/09/2023 29.4    • MCHC 09/09/2023 30.5 (L)    • RDW 09/09/2023 15.2 (H)    • MPV 09/09/2023 10.3    • Platelets 42/47/8497 130 (L)    • Magnesium 09/09/2023 1.9    • Phosphorus 09/09/2023 3.4    • Procalcitonin 09/09/2023 0.76 (H)    • Vancomycin Rm 09/09/2023 10.5    • Ammonia 09/09/2023 20    • pH, Ezequiel 09/09/2023 7.377    • pCO2, Ezequiel 09/09/2023 43.1    • pO2, Ezequiel 09/09/2023 22.3 (L)    • HCO3, Ezequiel 09/09/2023 24.8    • Base Excess, Ezequiel 09/09/2023 -0.5    • O2 Content, Ezequiel 09/09/2023 4.7    • O2 HGB, VENOUS 09/09/2023 35.0 (L)    • Sodium 09/09/2023 134 (L)    • Potassium 09/09/2023 3.6    • Chloride 09/09/2023 106    • CO2 09/09/2023 25    • ANION GAP 09/09/2023 3    • BUN 09/09/2023 27 (H)    • Creatinine 09/09/2023 0.57 (L)    • Glucose 09/09/2023 122    • Calcium 09/09/2023 8.9    • eGFR 09/09/2023 88    • Segmented % 09/09/2023 83 (H)    • Bands % 09/09/2023 1    • Lymphocytes % 09/09/2023 12 (L)    • Monocytes % 09/09/2023 0 (L)    • Eosinophils, % 09/09/2023 3    • Basophils % 09/09/2023 0    • Atypical Lymphocytes % 09/09/2023 1 (H)    • Absolute Neutrophils 09/09/2023 10.46 (H)    • Lymphocytes Absolute 09/09/2023 1.62    • Monocytes Absolute 09/09/2023 0.00    • Eosinophils Absolute 09/09/2023 0.37    • Basophils Absolute 09/09/2023 0.00    • Platelet Estimate 09/09/2023 Adequate    • Anisocytosis 09/09/2023 Present      Imaging Studies: I have personally reviewed pertinent reports. 9/8-CT CAP -There is a mass along the left chest wall involving the skin and the superficial tissue, measuring 7.7 cm extending into the superficial subcutaneous fat. No significant interval change. Consolidation in the inferior left lower lobe, probably atelectatic but correlate to exclude symptoms of pneumonia. Trace bilateral pleural fluid. No acute intra-abdominal pathology identified. Cholelithiasis. No sonographic cholecystitis. Counseling / Coordination of Care  Total time spent today  45 minutes. Greater than 50% of total time was spent with the patient and / or family counseling and / or coordination of care.

## 2023-09-09 NOTE — QUICK NOTE
Discussed suprapubic catheter placement with patient's wife. She is agreeable and would like to speak with IR.   Consult placed

## 2023-09-09 NOTE — TELEMEDICINE
e-Consult (IPC)  - Interventional Radiology  Michelle Gibson 80 y.o. male MRN: [id number]  Unit/Bed#: -01 Encounter: [id number]          Interventional Radiology has been consulted to evaluate [address] to IR  Consult performed by: Toribio Robledo MD  Consult ordered by: Sofía Arthur PA-C        09/09/23    Assessment/Recommendation:   89M presented with generalized weakness and AMS. Hx of uti, chronic conde. Noted to have penile erosion. Ir consulted for suprapubic tube placement. Order placed. Timing TBD. Ask [facility] IR Monday morning. NPO order placed just in case. 5-10 minutes, >50% of the total time devoted to medical consultative verbal/EMR discussion between providers. Written report will be generated in the EMR. Thank you for allowing Interventional Radiology to participate in the care of Michelle Gibson. Please don't hesitate to call or TigerText us with any questions.      Toribio Robledo MD

## 2023-09-09 NOTE — ASSESSMENT & PLAN NOTE
· Serum sodium 126 on admission  · Awaiting repeat BMP  · Received 2 L 0.9% NS in the ED    Plan:  · Urine osmo, urine sodium, serum osmo   · BMP Q8 hours- monitor and trend Na+  · Currently NPO.  Continue with 0.9% NS @ 50ml/hr  · Goal serum Na+ 132-134 in the next 24 hours

## 2023-09-09 NOTE — PROCEDURES
Incision and drain    Date/Time: 9/9/2023 6:15 PM    Performed by: John Lundberg PA-C  Authorized by: John Lundberg PA-C  Universal Protocol:  Consent: Verbal consent obtained. Written consent obtained. Risks and benefits: risks, benefits and alternatives were discussed  Consent given by: patient and spouse  Time out: Immediately prior to procedure a "time out" was called to verify the correct patient, procedure, equipment, support staff and site/side marked as required. Timeout called at: 9/9/2023 5:50 PM.  Patient understanding: patient does not state understanding of the procedure being performed (patient's POA understands)  Patient consent: the patient's understanding of the procedure does not match consent given (patient's POA understands)  Procedure consent: procedure consent matches procedure scheduled  Relevant documents: relevant documents present and verified  Test results: test results available and properly labeled  Site marked: the operative site was not marked  Radiology Images displayed and confirmed. If images not available, report reviewed: imaging studies available      Patient location:  Bedside  Location:     Indications for incision and drainage: mass of left chest wall. Size:  8 x8 cm    Location:  Trunk    Trunk location:  Chest  Pre-procedure details:     Skin preparation:  Betadine  Anesthesia (see MAR for exact dosages): Anesthesia method:  Local infiltration    Local anesthetic:  Lidocaine 1% w/o epi (8 cc)  Procedure details:     Complexity:  Simple    Needle aspiration: no      Incision types: Other (comment) (punch biopsy)    Incision depth:  Subcutaneous    Techniques: silver nitrate stick x3. Drainage characteristics: none. Wound treatment:  Wound left open    Packing materials:  None  Post-procedure details:     Patient tolerance of procedure: Tolerated well, no immediate complications    Complication (if applicable):  No active bleeding.  Patient tolerated well  Comments:      Adaptic and ABD dressing applied.

## 2023-09-09 NOTE — PROGRESS NOTES
Dallas Webb is a 80 y.o. male who is currently ordered Vancomycin IV with management by the Pharmacy Consult service. Relevant clinical data and objective / subjective history reviewed. Vancomycin Assessment:  Indication and Goal AUC/Trough: Pneumonia (goal -600, trough >10)  Clinical Status: stable  Micro:     Renal Function:  SCr: 0.67 mg/dL  CrCl: 56.3 mL/min  Renal replacement: Not on dialysis  Days of Therapy: 2  Current Dose: 750mg every 24 hours  Vancomycin Plan: random level resulted 10.5, since the level is <15 after the first dose, dose changed as below. New Dosinmg every 24 hours  Estimated AUC: 486 mcg*hr/mL  Estimated Trough: 13.9 mcg/mL  Next Level: 23 at 0600  Renal Function Monitoring: Daily BMP and [place] will continue to follow closely for s/sx of nephrotoxicity, infusion reactions and appropriateness of therapy. BMP and CBC will be ordered per protocol. We will continue to follow the patient’s culture results and clinical progress daily. Also, cefepime will be adjusted renally if necessary.     Holland Jameson, Pharmacist, PharmD, BCPS

## 2023-09-09 NOTE — PROGRESS NOTES
Progress Note -Surgery PA  Sarbjit Temple 80 y.o. 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 No new changes. Wife called today to see if she is agreeable with SPC but no answer. If she will give consent for procedure urology will place IR consult for said procedure. VTE Pharmacologic Prophylaxis: Heparin    Subjective/Objective     Subjective: no complaints/events    Objective/Physical Exam: Blood pressure 126/61, pulse 75, temperature 99.1 °F (37.3 °C), temperature source Bladder, resp. rate 20, height 5' 7" (1.702 m), weight 57 kg (125 lb 10.6 oz), SpO2 99 %. ,Body mass index is 19.68 kg/m².     General appearance: NAD  Heart: regular rate and rhythm, S1, S2 normal, no murmur, click, rub or gallop  Lungs: clear to auscultation bilaterally  Abdomen: soft, non-tender; bowel sounds normal; no masses,  no organomegaly , fungating mass L chest  Groin: conde in place with penile erosion, no erythema    Current Facility-Administered Medications:   •  aspirin chewable tablet 81 mg, 81 mg, Oral, Daily, KAREEM Prabhakar  •  atorvastatin (LIPITOR) tablet 20 mg, 20 mg, Oral, Daily With Dinner, KAREEM Prabhakar  •  cefepime (MAXIPIME) IVPB (premix in dextrose) 2,000 mg 50 mL, 2,000 mg, Intravenous, Q12H, KAREEM Prabhakar, Last Rate: 100 mL/hr at 09/08/23 2308, 2,000 mg at 09/08/23 2308  •  chlorhexidine (PERIDEX) 0.12 % oral rinse 15 mL, 15 mL, Mouth/Throat, Q12H [address], KAREEM Prabhakar, 15 mL at 09/08/23 2312  •  finasteride (PROSCAR) tablet 5 mg, 5 mg, Oral, Daily, KAREEM Flores  •  heparin (porcine) subcutaneous injection 5,000 Units, 5,000 Units, Subcutaneous, Q8H [facility], KAREEM Flores, 5,000 Units at 09/09/23 0319  •  magnesium sulfate 2 g/50 mL IVPB (premix) 2 g, 2 g, Intravenous, Once, Selvin Grubbs PA-C  •  potassium chloride 20 mEq IVPB (premix), 20 mEq, Intravenous, Q2H, Selvin Grubbs PA-C  •  sodium chloride 0.9 % infusion, 50 mL/hr, Intravenous, Continuous, KAREEM Flores, Last Rate: 50 mL/hr at 09/08/23 1517, 50 mL/hr at 09/08/23 1517  •  vancomycin (VANCOCIN) 1,250 mg in sodium chloride 0.9 % 250 mL IVPB, 1,250 mg, Intravenous, Q24H, Gavi Cardoso MD, Last Rate: 166.7 mL/hr at 09/09/23 0835, 1,250 mg at 09/09/23 0835     Lab, Imaging and other studies:  Admission on 09/08/2023   Component Date Value Ref Range Status   • Ventricular Rate 09/08/2023 86  BPM Incomplete   • Atrial Rate 09/08/2023 86  BPM Incomplete   • AK Interval 09/08/2023 324  ms Incomplete   • QRSD Interval 09/08/2023 138  ms Incomplete   • QT Interval 09/08/2023 380  ms Incomplete   • QTC Interval 09/08/2023 454  ms Incomplete   • P Axis 09/08/2023 59  degrees Incomplete   • QRS Axis 09/08/2023 -69  degrees Incomplete   • T Wave Axis 09/08/2023 74  degrees Incomplete   • WBC 09/08/2023 22.57 (H)  4.31 - 10.16 Thousand/uL Final   • RBC 09/08/2023 3.81 (L)  3.88 - 5.62 Million/uL Final   • Hemoglobin 09/08/2023 11.1 (L)  12.0 - 17.0 g/dL Final   • Hematocrit 09/08/2023 35.1 (L)  36.5 - 49.3 % Final   • MCV 09/08/2023 92  82 - 98 fL Final   • MCH 09/08/2023 29.1  26.8 - 34.3 pg Final   • MCHC 09/08/2023 31.6  31.4 - 37.4 g/dL Final   • RDW 09/08/2023 15.6 (H)  11.6 - 15.1 % Final   • MPV 09/08/2023 9.9  8.9 - 12.7 fL Final   • Platelets 37/88/6380 231  149 - 390 Thousands/uL Final   • nRBC 09/08/2023 0  /100 WBCs Final   • Neutrophils Relative 09/08/2023 86 (H)  43 - 75 % Final   • Immat GRANS % 09/08/2023 1  0 - 2 % Final   • Lymphocytes Relative 09/08/2023 7 (L)  14 - 44 % Final   • Monocytes Relative 09/08/2023 6  4 - 12 % Final   • Eosinophils Relative 09/08/2023 0  0 - 6 % Final   • Basophils Relative 09/08/2023 0  0 - 1 % Final   • Neutrophils Absolute 09/08/2023 19.53 (H)  1.85 - 7.62 Thousands/µL Final   • Immature Grans Absolute 09/08/2023 0.25 (H)  0.00 - 0.20 Thousand/uL Final   • Lymphocytes Absolute 09/08/2023 1.50  0.60 - 4.47 Thousands/µL Final   • Monocytes Absolute 09/08/2023 1.24 (H)  0.17 - 1.22 Thousand/µL Final   • Eosinophils Absolute 09/08/2023 0.00  0.00 - 0.61 Thousand/µL Final   • Basophils Absolute 09/08/2023 0.05  0.00 - 0.10 Thousands/µL Final   • Sodium 09/08/2023 126 (L)  135 - 147 mmol/L Final   • Potassium 09/08/2023 5.3  3.5 - 5.3 mmol/L Final   • Chloride 09/08/2023 95 (L)  96 - 108 mmol/L Final   • CO2 09/08/2023 23  21 - 32 mmol/L Final   • ANION GAP 09/08/2023 8  mmol/L Final   • BUN 09/08/2023 46 (H)  5 - 25 mg/dL Final   • Creatinine 09/08/2023 1.20  0.60 - 1.30 mg/dL Final   • Glucose 09/08/2023 148 (H)  65 - 140 mg/dL Final   • Calcium 09/08/2023 9.8  8.4 - 10.2 mg/dL Final   • Corrected Calcium 09/08/2023 10.4 (H)  8.3 - 10.1 mg/dL Final   • AST 09/08/2023 29  13 - 39 U/L Final   • ALT 09/08/2023 11  7 - 52 U/L Final   • Alkaline Phosphatase 09/08/2023 58  34 - 104 U/L Final   • Total Protein 09/08/2023 7.4  6.4 - 8.4 g/dL Final   • Albumin 09/08/2023 3.3 (L)  3.5 - 5.0 g/dL Final   • Total Bilirubin 09/08/2023 0.81  0.20 - 1.00 mg/dL Final   • eGFR 09/08/2023 51  ml/min/1.73sq m Final   • LACTIC ACID 09/08/2023 3.0 (HH)  0.5 - 2.0 mmol/L Final   • Procalcitonin 09/08/2023 1.03 (H)  <=0.25 ng/ml Final   • Protime 09/08/2023 15.5 (H)  11.6 - 14.5 seconds Final   • INR 09/08/2023 1.15  0.84 - 1.19 Final   • PTT 09/08/2023 33  23 - 37 seconds Final   • Gram Stain Result 09/08/2023 Gram negative rods (A)   Preliminary   • Gram Stain Result 09/08/2023 Gram negative rods (A)   Preliminary   • Color, UA 09/08/2023 Dark Brown   Final   • Clarity, UA 09/08/2023 Slightly Cloudy   Final   • Specific Gravity, UA 09/08/2023 1.015  1.005 - 1.030 Final   • pH, UA 09/08/2023 6.0  4.5, 5.0, 5.5, 6.0, 6.5, 7.0, 7.5, 8.0 Final   • Leukocytes, UA 09/08/2023 Large (A)  Negative Final   • Nitrite, UA 09/08/2023 Positive (A)  Negative Final   • Protein, UA 09/08/2023 30 (1+) (A)  Negative mg/dl Final   • Glucose, UA 09/08/2023 Negative  Negative mg/dl Final   • Ketones, UA 09/08/2023 Negative  Negative mg/dl Final   • Urobilinogen, UA 09/08/2023 <2.0  <2.0 mg/dl mg/dl Final   • Bilirubin, UA 09/08/2023 Negative  Negative Final   • Occult Blood, UA 09/08/2023 Small (A)  Negative Final   • Lipase 09/08/2023 19  11 - 82 u/L Final   • SARS-CoV-2 09/08/2023 Negative  Negative Final   • INFLUENZA A PCR 09/08/2023 Negative  Negative Final   • INFLUENZA B PCR 09/08/2023 Negative  Negative Final   • RSV PCR 09/08/2023 Negative  Negative Final   • hs TnI 0hr 09/08/2023 89 (H)  "Refer to ACS Flowchart"- see link ng/L Final   • LACTIC ACID 09/08/2023 1.9  0.5 - 2.0 mmol/L Final   • hs TnI 2hr 09/08/2023 146 (H)  "Refer to ACS Flowchart"- see link ng/L Final   • Delta 2hr hsTnI 09/08/2023 57 (H)  <20 ng/L Final   • hs TnI 4hr 09/08/2023 192 (H)  "Refer to ACS Flowchart"- see link ng/L Final   • Delta 4hr hsTnI 09/08/2023 103 (H)  <20 ng/L Final   • RBC, UA 09/08/2023 1-2  None Seen, 0-1, 1-2, 2-4, 0-5 /hpf Final   • WBC, UA 09/08/2023 Innumerable (A)  None Seen, 0-1, 1-2, 0-5, 2-4 /hpf Final   • Epithelial Cells 09/08/2023 None Seen  None Seen, Occasional /hpf Final   • Bacteria, UA 09/08/2023 Occasional  None Seen, Occasional /hpf Final   • Urine Culture 09/08/2023 30,000-39,000 cfu/ml   Final   • WBC 09/08/2023 18.38 (H)  4.31 - 10.16 Thousand/uL Final   • RBC 09/08/2023 3.34 (L)  3.88 - 5.62 Million/uL Final   • Hemoglobin 09/08/2023 9.8 (L)  12.0 - 17.0 g/dL Final   • Hematocrit 09/08/2023 31.3 (L)  36.5 - 49.3 % Final   • MCV 09/08/2023 94  82 - 98 fL Final   • MCH 09/08/2023 29.3  26.8 - 34.3 pg Final   • MCHC 09/08/2023 31.3 (L)  31.4 - 37.4 g/dL Final   • RDW 09/08/2023 15.6 (H)  11.6 - 15.1 % Final   • MPV 09/08/2023 9.6  8.9 - 12.7 fL Final   • Platelets 71/10/4075 164  149 - 390 Thousands/uL Final   • Sodium 09/08/2023 129 (L)  135 - 147 mmol/L Final   • Potassium 09/08/2023 4.1  3.5 - 5.3 mmol/L Final   • Chloride 09/08/2023 101  96 - 108 mmol/L Final   • CO2 09/08/2023 20 (L)  21 - 32 mmol/L Final   • ANION GAP 09/08/2023 8  mmol/L Final   • BUN 09/08/2023 38 (H)  5 - 25 mg/dL Final   • Creatinine 09/08/2023 0.94  0.60 - 1.30 mg/dL Final   • Glucose 09/08/2023 163 (H)  65 - 140 mg/dL Final   • Calcium 09/08/2023 8.9  8.4 - 10.2 mg/dL Final   • eGFR 09/08/2023 69  ml/min/1.73sq m Final   • Magnesium 09/08/2023 1.9  1.9 - 2.7 mg/dL Final   • Phosphorus 09/08/2023 3.7  2.3 - 4.1 mg/dL Final   • Strep pneumoniae antigen, urine 09/08/2023 Negative  Negative Final   • Legionella Urinary Antigen 09/08/2023 Negative  Negative Final   • Gram Stain Result 09/08/2023 1+ Gram negative rods (A)   Preliminary   • Gram Stain Result 09/08/2023 No polys seen (A)   Preliminary   • Ventricular Rate 09/08/2023 82  BPM Incomplete   • Atrial Rate 09/08/2023 67  BPM Incomplete   • QRSD Interval 09/08/2023 136  ms Incomplete   • QT Interval 09/08/2023 400  ms Incomplete   • QTC Interval 09/08/2023 467  ms Incomplete   • QRS Axis 09/08/2023 -68  degrees Incomplete   • T Wave Axis 09/08/2023 72  degrees Incomplete   • Segmented % 09/08/2023 88 (H)  43 - 75 % Final   • Bands % 09/08/2023 1  0 - 8 % Final   • Lymphocytes % 09/08/2023 5 (L)  14 - 44 % Final   • Monocytes % 09/08/2023 5  4 - 12 % Final   • Eosinophils, % 09/08/2023 1  0 - 6 % Final   • Basophils % 09/08/2023 0  0 - 1 % Final   • Absolute Neutrophils 09/08/2023 16.36 (H)  1.85 - 7.62 Thousand/uL Final   • Lymphocytes Absolute 09/08/2023 0.92  0.60 - 4.47 Thousand/uL Final   • Monocytes Absolute 09/08/2023 0.92  0.00 - 1.22 Thousand/uL Final   • Eosinophils Absolute 09/08/2023 0.18  0.00 - 0.40 Thousand/uL Final   • Basophils Absolute 09/08/2023 0.00  0.00 - 0.10 Thousand/uL Final   • Toxic Granulation 09/08/2023 Present   Final   • Platelet Estimate 36/43/1750 Adequate  Adequate Final   • Anisocytosis 09/08/2023 Present   Final   • HS TnI random 09/08/2023 172 (H)  8 - 18 ng/L Final   • Sodium, Ur 09/08/2023 14  Reference range not established.  Final   • Osmolality, Ur 09/08/2023 324  250 - 900 mmol/KG Final   • Osmolality Serum 09/09/2023 287  282 - 298 mmol/KG Final   • Vitamin B-12 09/08/2023 275  180 - 914 pg/mL Final   • Folate 09/08/2023 7.6  >5.9 ng/mL Final   • TSH 3RD GENERATON 09/08/2023 1.521  0.450 - 4.500 uIU/mL Final   • Sodium 09/09/2023 132 (L)  135 - 147 mmol/L Final   • Potassium 09/09/2023 4.0  3.5 - 5.3 mmol/L Final   • Chloride 09/09/2023 106  96 - 108 mmol/L Final   • CO2 09/09/2023 21  21 - 32 mmol/L Final   • ANION GAP 09/09/2023 5  mmol/L Final   • BUN 09/09/2023 31 (H)  5 - 25 mg/dL Final   • Creatinine 09/09/2023 0.67  0.60 - 1.30 mg/dL Final   • Glucose 09/09/2023 144 (H)  65 - 140 mg/dL Final   • Calcium 09/09/2023 8.7  8.4 - 10.2 mg/dL Final   • eGFR 09/09/2023 82  ml/min/1.73sq m Final   • Enterobacter cloacae complex 09/08/2023 Detected (A)  Not Detected Preliminary   • WBC 09/09/2023 12.45 (H)  4.31 - 10.16 Thousand/uL Final   • RBC 09/09/2023 3.16 (L)  3.88 - 5.62 Million/uL Final   • Hemoglobin 09/09/2023 9.3 (L)  12.0 - 17.0 g/dL Final   • Hematocrit 09/09/2023 30.5 (L)  36.5 - 49.3 % Final   • MCV 09/09/2023 97  82 - 98 fL Final   • MCH 09/09/2023 29.4  26.8 - 34.3 pg Final   • MCHC 09/09/2023 30.5 (L)  31.4 - 37.4 g/dL Final   • RDW 09/09/2023 15.2 (H)  11.6 - 15.1 % Final   • MPV 09/09/2023 10.3  8.9 - 12.7 fL Final   • Platelets 64/94/3197 130 (L)  149 - 390 Thousands/uL Final   • Magnesium 09/09/2023 1.9  1.9 - 2.7 mg/dL Final   • Phosphorus 09/09/2023 3.4  2.3 - 4.1 mg/dL Final   • Procalcitonin 09/09/2023 0.76 (H)  <=0.25 ng/ml Final   • Vancomycin Rm 09/09/2023 10.5  10.0 - 20.0 ug/mL Final   • Ammonia 09/09/2023 20  18 - 72 umol/L Final   • pH, Ezequiel 09/09/2023 7.377  7.300 - 7.400 Final   • pCO2, Ezequiel 09/09/2023 43.1  42.0 - 50.0 mm Hg Final   • pO2, Ezequiel 09/09/2023 22.3 (L)  35.0 - 45.0 mm Hg Final   • HCO3, Ezequiel 09/09/2023 24.8  24 - 30 mmol/L Final   • Base Excess, Ezequiel 09/09/2023 -0.5  mmol/L Final   • O2 Content, [facility] 09/09/2023 4.7  ml/dL Final   • O2 HGB, VENOUS 09/09/2023 35.0 (L)  60.0 - 80.0 % Final   • Sodium 09/09/2023 134 (L)  135 - 147 mmol/L Final   • Potassium 09/09/2023 3.6  3.5 - 5.3 mmol/L Final   • Chloride 09/09/2023 106  96 - 108 mmol/L Final   • CO2 09/09/2023 25  21 - 32 mmol/L Final   • ANION GAP 09/09/2023 3  mmol/L Final   • BUN 09/09/2023 27 (H)  5 - 25 mg/dL Final   • Creatinine 09/09/2023 0.57 (L)  0.60 - 1.30 mg/dL Final   • Glucose 09/09/2023 122  65 - 140 mg/dL Final   • Calcium 09/09/2023 8.9  8.4 - 10.2 mg/dL Final   • eGFR 09/09/2023 88  ml/min/1.73sq m Final   • Segmented % 09/09/2023 83 (H)  43 - 75 % Final   • Bands % 09/09/2023 1  0 - 8 % Final   • Lymphocytes % 09/09/2023 12 (L)  14 - 44 % Final   • Monocytes % 09/09/2023 0 (L)  4 - 12 % Final   • Eosinophils, % 09/09/2023 3  0 - 6 % Final   • Basophils % 09/09/2023 0  0 - 1 % Final   • Atypical Lymphocytes % 09/09/2023 1 (H)  <=0 % Final   • Absolute Neutrophils 09/09/2023 10.46 (H)  1.85 - 7.62 Thousand/uL Final   • Lymphocytes Absolute 09/09/2023 1.62  0.60 - 4.47 Thousand/uL Final   • Monocytes Absolute 09/09/2023 0.00  0.00 - 1.22 Thousand/uL Final   • Eosinophils Absolute 09/09/2023 0.37  0.00 - 0.40 Thousand/uL Final   • Basophils Absolute 09/09/2023 0.00  0.00 - 0.10 Thousand/uL Final   • Platelet Estimate 44/83/9001 Adequate  Adequate Final   • Anisocytosis 09/09/2023 Present   Final

## 2023-09-09 NOTE — PROGRESS NOTES
Critical Care Interval Transfer Note:    Please refer to progress note from earlier today for full details. Barriers to discharge:   · Continue IV antibiotics  · Awaiting plan from IR regarding SPT  · Awaiting plan from surgery regarding biopsy of L chest wall mass  · Follow-up wound culture and urine culture     Consults: IP CONSULT TO CASE MANAGEMENT  IP CONSULT TO PHARMACY  IP CONSULT TO UROLOGY  IP CONSULT TO NUTRITION SERVICES  IP CONSULT TO SURGICAL ONCOLOGY  INPATIENT CONSULT TO IR    Recommended to review admission imaging for incidental findings and document in discharge navigator: Chart reviewed, no known incidental findings noted at this time. Discharge Plan: Anticipate discharge in >72 hrs to pending PT/OT eval  Conde Plan: chronic conde            Patient seen and evaluated by Critical Care today and deemed to be appropriate for transfer to Med Surg. Spoke to Dr. Wolf Giles MD from [address] [place] [address] to accept transfer. Critical care can be contacted via [employer] with any questions or concerns. For shingles vaccine call [phone] ask for pharmacy department.    PSYCHIATRY [phone]- Dr. Israel

## 2023-09-09 NOTE — PROGRESS NOTES
[facility]  Progress Note  Name: Chika Mena  MRN: [id number]  Unit/Bed#: -01 I Date of Admission: 9/8/2023   Date of Service: 9/9/2023 I Hospital Day: 1    Assessment/Plan   * Severe sepsis [facility])  Assessment & Plan  · Presented to the hospital after being found unresponsive at home. In the ED was found to have a dirty UA: + nitrate, Lg Leukocytes, innumerable WBC. CT CAP was also significant for  Left lower lobe consolidation atelectasis vs PNA. · Patient also found to have a large open wound on the anterior left chest with purulent drainage present  · Recently admitted 07/2023 for UTI/bacteremia- found to have Enterobacter cloacae bacteremia and klebsiella UTI  · Initial lactate 3.0 > 1.9, Procalcitonin 1.03. WBC 22.57  · Initially was hypotensive which improved with 2 L IVF resuscitation  · Received empiric Cefepime in the ED    Plan:  · Continue with Cefepime and Vancomycin IV  · Pharmacy consult for vancomycin management  · MAP goal > 65 mmHg. BP stable currently. Can give additional fluids vs. Starting pressors if patient becomes hypotensive  · Follow up urine/blood cultures  · Check Strep pneumo/Legionella   · Culture L. Chest wound    Acute metabolic encephalopathy  Assessment & Plan  · Per the patients wife, patient was in normal state of health last night. Patient was found unresponsive this morning. · CTH negative  · Likely in the setting of sepsis and hypotension  · Patient now more awake and alert. A/O x 2. Unable to tell time or situation. Per patients wife at bedside patient is much improved in mentation but not at baseline. · Per wife-Patient is usually a/o x 2. And unable to tell time or situation. Patient is hard of hearing at baseline. Patient has not been able to make his own medical decision for a while, and is usually unable to tell the time of the year or month. At baseline patient is able to be assisted out of bed and placed in wheelchair. Plan:  · Neuro checks Q4 hours  · Treatment of sepsis as above  · Delirium precautions     Hyponatremia  Assessment & Plan  · Serum sodium 126 on admission  · Awaiting repeat BMP  · Received 2 L 0.9% NS in the ED    Plan:  · Urine osmo, urine sodium, serum osmo   · BMP Q8 hours- monitor and trend Na+  · Currently NPO. Continue with 0.9% NS @ 50ml/hr  · Goal serum Na+ 132-134 in the next 24 hours    CHICO (acute kidney injury) ([address])  Assessment & Plan  · Patients baseline creatinine 0.4-0.6. Presents with creatinine at 1.2  · Likely pre-renal in the setting of sepsis and hypotension  · S/p 2 L IVF in the ED  · Chronic conde    Plan:  · Continue with 0.9% NS @ 50 ml/hr for hydration  · BMP Q8 hrs. Follow and trend renal indicies  · Avoid nephrotoxic agents  · Renally dose medications    HTN (hypertension)  Assessment & Plan  · Home regimen: Losartan    Plan:  · Hold in the setting of low BP and sepsis    HLD (hyperlipidemia)  Assessment & Plan  · Continue home dose statin    Chronic indwelling Conde catheter  Assessment & Plan  · By history  · Penis with wound to posterior aspect  · Recurrent UTI's    Plan:  · Urology consult-appreciate recommendations    Chest wall mass  Assessment & Plan  · Present on 07/2023 admission. Per wife, patient has had wound for a number of year and has not had it evaluated by a doctor. At that time the recommendation was for the patient to follow up with surgical oncology outpatient for a biopsy. Per wife- Have not been able to follow up yet.   Wound with purulent drainage (see media)    Plan:  · Follow up with wound culture results  · Wound nurse consult  · Recommended to follow up with surgical oncology outpatient for biopsy       Elevated troponin  Assessment & Plan  · Troponin elevated on admission 89> 146> 192. 4 hour delta 103  · EKG SR with RBBB- unchanged from previous EKG from 07/2023  · Without complaints of CP  · Likely demand in the setting of sepsis, hypotension, CHICO    Plan:  · Continue home dose ASA  · Troponin Q4 hr until peak  · Consider cardiology consult   · Maintain telemetry monitoring  · VS per unit policy             ICU Core Measures     A: Assess, Prevent, and Manage Pain · Has pain been assessed? Yes  · Need for changes to pain regimen? No   B: Both SAT/SAT  · N/A   C: Choice of Sedation · RASS Goal: 0 Alert and Calm  · Need for changes to sedation or analgesia regimen? No   D: Delirium · CAM-ICU: Negative   E: Early Mobility  · Plan for early mobility? Yes   F: Family Engagement · Plan for family engagement today? Yes       Antibiotic Review: Awaiting culture results. Review of Invasive Devices: Karla Plan: Continue for accurate I/O monitoring for 48 hours        Prophylaxis:  VTE VTE covered by:  heparin (porcine), Subcutaneous, 5,000 Units at 09/08/23 2308       Stress Ulcer  not ordered       Subjective   HPI/24hr events: Continue antibiotics while awaiting cultures. Sodium improving on gentle hydration. Pending urine and lyte studies. Hemodynamically stable and could be downgraded. Review of Systems   All other systems reviewed and are negative. Objective                            Vitals I/O      Most Recent Min/Max in 24hrs   Temp (!) 97.2 °F (36.2 °C) Temp  Min: 97.2 °F (36.2 °C)  Max: 98.5 °F (36.9 °C)   Pulse 70 Pulse  Min: 66  Max: 83   Resp 16 Resp  Min: 16  Max: 20   /54 BP  Min: 73/43  Max: 142/58   O2 Sat 92 % SpO2  Min: 86 %  Max: 98 %      Intake/Output Summary (Last 24 hours) at 9/9/2023 0438  Last data filed at 9/8/2023 2243  Gross per 24 hour   Intake 2363.33 ml   Output 925 ml   Net 1438.33 ml         Diet NPO; Sips with meds     Invasive Monitoring Physical exam   None Physical Exam  Eyes:      Pupils: Pupils are equal, round, and reactive to light. Skin:     General: Skin is warm. Findings: Lesion (large mass on chest) present. Comments: Scattered ecchymosis.     HENT:      Mouth/Throat:      Mouth: Mucous membranes are dry. Cardiovascular:      Rate and Rhythm: Normal rate and regular rhythm. Pulses: Normal pulses. Abdominal:      Palpations: Abdomen is soft. Pulmonary:      Effort: Pulmonary effort is normal.      Breath sounds: Normal breath sounds. Neurological:      General: No focal deficit present. Mental Status: He is alert. He is disoriented to time. Diagnostic Studies      EKG: NSR   Imaging:  I have personally reviewed pertinent films in PACS     Medications:  Scheduled PRN   aspirin, 81 mg, Daily  atorvastatin, 20 mg, Daily With Dinner  cefepime, 2,000 mg, Q12H  chlorhexidine, 15 mL, Q12H THA  finasteride, 5 mg, Daily  heparin (porcine), 5,000 Units, Q8H [facility]  vancomycin, 750 mg, Q24H          Continuous    sodium chloride, 50 mL/hr, Last Rate: 50 mL/hr (09/08/23 1517)         Labs:    CBC    Recent Labs     09/08/23  1057 09/08/23  1552   WBC 22.57* 18.38*   HGB 11.1* 9.8*   HCT 35.1* 31.3*    164   BANDSPCT  --  1     BMP    Recent Labs     09/08/23  1552 09/09/23  0024   SODIUM 129* 132*   K 4.1 4.0    106   CO2 20* 21   AGAP 8 5   BUN 38* 31*   CREATININE 0.94 0.67   CALCIUM 8.9 8.7       Coags    Recent Labs     09/08/23  1057   INR 1.15   PTT 33        Additional Electrolytes  Recent Labs     09/08/23  1552   MG 1.9   PHOS 3.7          Blood Gas    No recent results  No recent results LFTs  Recent Labs     09/08/23  1057   ALT 11   AST 29   ALKPHOS 58   ALB 3.3*   TBILI 0.81       Infectious  Recent Labs     09/08/23  1057   PROCALCITONI 1.03*     Glucose  Recent Labs     09/08/23  1057 09/08/23  1552 09/09/23  0024   GLUC 148* 163* 144*               No critical care time spent.     KAREEM Goldstein

## 2023-09-10 PROBLEM — Z71.89 GOALS OF CARE, COUNSELING/DISCUSSION: Status: ACTIVE | Noted: 2023-09-10

## 2023-09-10 PROBLEM — N17.9 AKI (ACUTE KIDNEY INJURY) (HCC): Status: RESOLVED | Noted: 2023-09-08 | Resolved: 2023-09-10

## 2023-09-10 PROBLEM — E87.1 HYPONATREMIA: Status: RESOLVED | Noted: 2023-07-06 | Resolved: 2023-09-10

## 2023-09-10 LAB
ANION GAP SERPL CALCULATED.3IONS-SCNC: 3 MMOL/L
ATRIAL RATE: 67 BPM
ATRIAL RATE: 86 BPM
BUN SERPL-MCNC: 22 MG/DL (ref 5–25)
CALCIUM SERPL-MCNC: 8.5 MG/DL (ref 8.4–10.2)
CHLORIDE SERPL-SCNC: 108 MMOL/L (ref 96–108)
CO2 SERPL-SCNC: 24 MMOL/L (ref 21–32)
CREAT SERPL-MCNC: 0.5 MG/DL (ref 0.6–1.3)
ERYTHROCYTE [DISTWIDTH] IN BLOOD BY AUTOMATED COUNT: 15 % (ref 11.6–15.1)
GFR SERPL CREATININE-BSD FRML MDRD: 93 ML/MIN/1.73SQ M
GLUCOSE SERPL-MCNC: 117 MG/DL (ref 65–140)
HCT VFR BLD AUTO: 25.5 % (ref 36.5–49.3)
HGB BLD-MCNC: 7.7 G/DL (ref 12–17)
MAGNESIUM SERPL-MCNC: 2.1 MG/DL (ref 1.9–2.7)
MCH RBC QN AUTO: 28.3 PG (ref 26.8–34.3)
MCHC RBC AUTO-ENTMCNC: 30.2 G/DL (ref 31.4–37.4)
MCV RBC AUTO: 94 FL (ref 82–98)
P AXIS: 59 DEGREES
PLATELET # BLD AUTO: 137 THOUSANDS/UL (ref 149–390)
PMV BLD AUTO: 9.7 FL (ref 8.9–12.7)
POTASSIUM SERPL-SCNC: 4.1 MMOL/L (ref 3.5–5.3)
PR INTERVAL: 324 MS
QRS AXIS: -68 DEGREES
QRS AXIS: -69 DEGREES
QRSD INTERVAL: 136 MS
QRSD INTERVAL: 138 MS
QT INTERVAL: 380 MS
QT INTERVAL: 400 MS
QTC INTERVAL: 454 MS
QTC INTERVAL: 467 MS
RBC # BLD AUTO: 2.72 MILLION/UL (ref 3.88–5.62)
SODIUM SERPL-SCNC: 135 MMOL/L (ref 135–147)
T WAVE AXIS: 72 DEGREES
T WAVE AXIS: 74 DEGREES
VENTRICULAR RATE: 82 BPM
VENTRICULAR RATE: 86 BPM
WBC # BLD AUTO: 6.28 THOUSAND/UL (ref 4.31–10.16)

## 2023-09-10 PROCEDURE — 85027 COMPLETE CBC AUTOMATED: CPT | Performed by: INTERNAL MEDICINE

## 2023-09-10 PROCEDURE — 83735 ASSAY OF MAGNESIUM: CPT | Performed by: INTERNAL MEDICINE

## 2023-09-10 PROCEDURE — 93010 ELECTROCARDIOGRAM REPORT: CPT | Performed by: INTERNAL MEDICINE

## 2023-09-10 PROCEDURE — 92610 EVALUATE SWALLOWING FUNCTION: CPT

## 2023-09-10 PROCEDURE — 80048 BASIC METABOLIC PNL TOTAL CA: CPT | Performed by: INTERNAL MEDICINE

## 2023-09-10 PROCEDURE — 99233 SBSQ HOSP IP/OBS HIGH 50: CPT | Performed by: INTERNAL MEDICINE

## 2023-09-10 RX ADMIN — CEFEPIME HYDROCHLORIDE 2000 MG: 2 INJECTION, SOLUTION INTRAVENOUS at 12:06

## 2023-09-10 RX ADMIN — ATORVASTATIN CALCIUM 20 MG: 20 TABLET, FILM COATED ORAL at 16:03

## 2023-09-10 RX ADMIN — HEPARIN SODIUM 5000 UNITS: 5000 INJECTION INTRAVENOUS; SUBCUTANEOUS at 22:09

## 2023-09-10 RX ADMIN — HEPARIN SODIUM 5000 UNITS: 5000 INJECTION INTRAVENOUS; SUBCUTANEOUS at 14:48

## 2023-09-10 RX ADMIN — HEPARIN SODIUM 5000 UNITS: 5000 INJECTION INTRAVENOUS; SUBCUTANEOUS at 07:53

## 2023-09-10 RX ADMIN — CEFEPIME HYDROCHLORIDE 2000 MG: 2 INJECTION, SOLUTION INTRAVENOUS at 22:08

## 2023-09-10 RX ADMIN — ASPIRIN 81 MG CHEWABLE TABLET 81 MG: 81 TABLET CHEWABLE at 09:29

## 2023-09-10 RX ADMIN — FINASTERIDE 5 MG: 5 TABLET, FILM COATED ORAL at 09:29

## 2023-09-10 NOTE — ASSESSMENT & PLAN NOTE
Patient had chronic indwelling Acosta catheter  Urology consulted-recommendations for [facility]  Patient's wife however does not want SPC. She stated someone would call her regarding the procedure, and no one talked to her. Without knowing the risks, she does not any procedure to be done.

## 2023-09-10 NOTE — PLAN OF CARE
Problem: Potential for Falls  Goal: Patient will remain free of falls  Description: INTERVENTIONS:  - Educate patient/family on patient safety including physical limitations  - Instruct patient to call for assistance with activity   - Consult OT/PT to assist with strengthening/mobility   - Keep Call bell within reach  - Keep bed low and locked with side rails adjusted as appropriate  - Keep care items and personal belongings within reach  - Initiate and maintain comfort rounds  - Make Fall Risk Sign visible to staff  - Offer Toileting every 2 Hours, in advance of need  - Initiate/Maintain bed/ chair alarm  - Obtain necessary fall risk management equipment: walker  - Apply yellow socks and bracelet for high fall risk patients  - Consider moving patient to room near nurses station  Outcome: Progressing     Problem: MOBILITY - ADULT  Goal: Maintain or return to baseline ADL function  Description: INTERVENTIONS:  - Educate patient/family on patient safety including physical limitations  - Instruct patient to call for assistance with activity   - Consult OT/PT to assist with strengthening/mobility   - Keep Call bell within reach  - Keep bed low and locked with side rails adjusted as appropriate  - Keep care items and personal belongings within reach  - Initiate and maintain comfort rounds  - Make Fall Risk Sign visible to staff  - Offer Toileting every 2 Hours, in advance of need  - Initiate/Maintain bed/ chair alarm  - Obtain necessary fall risk management equipment: n/a  - Apply yellow socks and bracelet for high fall risk patients  - Consider moving patient to room near nurses station  Outcome: Progressing  Goal: Maintains/Returns to pre admission functional level  Description: INTERVENTIONS:  - Perform BMAT or MOVE assessment daily.   - Set and communicate daily mobility goal to care team and patient/family/caregiver.    - Collaborate with rehabilitation services on mobility goals if consulted  - Perform Range of Motion 3 times a day. - Reposition patient every 2 hours.   - Dangle patient 3 times a day  - Stand patient n/a  - Ambulate patient n/a  - Out of bed to chair 3 times a day   - Out of bed for meals 3 times a day  - Out of bed for toileting  - Record patient progress and toleration of activity level   Outcome: Progressing     Problem: Prexisting or High Potential for Compromised Skin Integrity  Goal: Skin integrity is maintained or improved  Description: INTERVENTIONS:  - Identify patients at risk for skin breakdown  - Assess and monitor skin integrity  - Assess and monitor nutrition and hydration status  - Monitor labs   - Assess for incontinence   - Turn and reposition patient  - Assist with mobility/ambulation  - Relieve pressure over bony prominences  - Avoid friction and shearing  - Provide appropriate hygiene as needed including keeping skin clean and dry  - Evaluate need for skin moisturizer/barrier cream  - Collaborate with interdisciplinary team   - Patient/family teaching  - Consider wound care consult   Outcome: Progressing     Problem: PAIN - ADULT  Goal: Verbalizes/displays adequate comfort level or baseline comfort level  Description: Interventions:  - Encourage patient to monitor pain and request assistance  - Assess pain using appropriate pain scale  - Administer analgesics based on type and severity of pain and evaluate response  - Implement non-pharmacological measures as appropriate and evaluate response  - Consider cultural and social influences on pain and pain management  - Notify physician/advanced practitioner if interventions unsuccessful or patient reports new pain  Outcome: Progressing     Problem: INFECTION - ADULT  Goal: Absence or prevention of progression during hospitalization  Description: INTERVENTIONS:  - Assess and monitor for signs and symptoms of infection  - Monitor lab/diagnostic results  - Monitor all insertion sites, i.e. indwelling lines, tubes, and drains  - Monitor endotracheal if appropriate and nasal secretions for changes in amount and color  - [place] appropriate cooling/warming therapies per order  - Administer medications as ordered  - Instruct and encourage patient and family to use good hand hygiene technique  - Identify and instruct in appropriate isolation precautions for identified infection/condition  Outcome: Progressing  Goal: Absence of fever/infection during neutropenic period  Description: INTERVENTIONS:  - Monitor WBC    Outcome: Progressing     Problem: SAFETY ADULT  Goal: Patient will remain free of falls  Description: INTERVENTIONS:  - Educate patient/family on patient safety including physical limitations  - Instruct patient to call for assistance with activity   - Consult OT/PT to assist with strengthening/mobility   - Keep Call bell within reach  - Keep bed low and locked with side rails adjusted as appropriate  - Keep care items and personal belongings within reach  - Initiate and maintain comfort rounds  - Make Fall Risk Sign visible to staff  - Offer Toileting every 2 Hours, in advance of need  - Initiate/Maintain bed/ chair alarm  - Obtain necessary fall risk management equipment: walker  - Apply yellow socks and bracelet for high fall risk patients  - Consider moving patient to room near nurses station  Outcome: Progressing  Goal: Maintain or return to baseline ADL function  Description: INTERVENTIONS:  - Educate patient/family on patient safety including physical limitations  - Instruct patient to call for assistance with activity   - Consult OT/PT to assist with strengthening/mobility   - Keep Call bell within reach  - Keep bed low and locked with side rails adjusted as appropriate  - Keep care items and personal belongings within reach  - Initiate and maintain comfort rounds  - Make Fall Risk Sign visible to staff  - Offer Toileting every 2 Hours, in advance of need  - Initiate/Maintain bed/ chair alarm  - Obtain necessary fall risk management equipment: n/a  - Apply yellow socks and bracelet for high fall risk patients  - Consider moving patient to room near nurses station  Outcome: Progressing  Goal: Maintains/Returns to pre admission functional level  Description: INTERVENTIONS:  - Perform BMAT or MOVE assessment daily.   - Set and communicate daily mobility goal to care team and patient/family/caregiver. - Collaborate with rehabilitation services on mobility goals if consulted  - Perform Range of Motion 3 times a day. - Reposition patient every 2 hours. - Dangle patient 3 times a day  - Stand patient n/a  - Ambulate patient n/a  - Out of bed to chair 3 times a day   - Out of bed for meals 3 times a day  - Out of bed for toileting  - Record patient progress and toleration of activity level   Outcome: Progressing     Problem: DISCHARGE PLANNING  Goal: Discharge to home or other facility with appropriate resources  Description: INTERVENTIONS:  - Identify barriers to discharge w/patient and caregiver  - Arrange for needed discharge resources and transportation as appropriate  - Identify discharge learning needs (meds, wound care, etc.)  - Arrange for interpretive services to assist at discharge as needed  - Refer to Case Management Department for coordinating discharge planning if the patient needs post-hospital services based on physician/advanced practitioner order or complex needs related to functional status, cognitive ability, or social support system  Outcome: Progressing     Problem: Knowledge Deficit  Goal: Patient/family/caregiver demonstrates understanding of disease process, treatment plan, medications, and discharge instructions  Description: Complete learning assessment and assess knowledge base.   Interventions:  - Provide teaching at level of understanding  - Provide teaching via preferred learning methods  Outcome: Progressing

## 2023-09-10 NOTE — SPEECH THERAPY NOTE
Speech Language/Pathology  Speech-Language Pathology Bedside Swallow Evaluation      Patient Name: Deven Vance    CMULL'V Date: 9/10/2023     Problem List  Principal Problem:    Severe sepsis [facility])  Active Problems:    Elevated troponin    Chest wall mass    Hyponatremia    Acute metabolic encephalopathy    CHICO (acute kidney injury) ([address])    Chronic indwelling Acosta catheter    HLD (hyperlipidemia)    HTN (hypertension)      Past Medical History  Past Medical History:   Diagnosis Date   • Acute metabolic encephalopathy 8/6/5666   • Coronary artery disease    • Diabetes mellitus ([address])    • Renal disorder        Past Surgical History  Past Surgical History:   Procedure Laterality Date   • CORONARY ANGIOPLASTY WITH STENT PLACEMENT         Summary   Pt presented with functional appearing oral and pharyngeal stage swallowing skills with materials administered today. He reports not having dentures but able to tolerate solids that his wife cooks at home. No difficulty w/ any trials offered today. No current or recent pna or other URI, no fevers. Recommended Diet: regular diet and thin liquids -choose the softer material, add sauce and gravy, cut up for pt   Recommended Form of Meds: as desired   Aspiration precautions and swallowing strategies: upright posture, only feed when fully alert, slow rate of feeding and alternating bites and sips  Other Recommendations: Continue frequent oral care, no f/u needed at this time        Current Medical Status  Pt is a 80 y.o. male who presented to [address] [place] [address] with fatigue, weakness, AMS. Pt w/ UTI vs LLL consolidation. Also noted a Large L chest wall fungating mass (present for 7 years). CT chest did not show abscess. Mass biopsied this stay.       Current Precautions:  Fall  Aspiration      Allergies:  No known food allergies    Past medical history:  Please see H&P for details  h/o sepsis in July 2023 due to UTI, HTN  Special Studies:  CT head-No acute intracranial abnormality. Involutional changes.     Social/Education/Vocational Hx:  Pt lives with family    Swallow Information   Current Risks for Dysphagia & Aspiration: known history of dysphagia  Current Symptoms/Concerns: unknown  Current Diet: soft/level 3 diet and thin liquids   Baseline Diet: regular diet and thin liquids      Baseline Assessment   Behavior/Cognition: alert  Speech/Language Status: able to participate in conversation and able to follow commands  Patient Positioning: upright in bed  Pain Status/Interventions/Response to Interventions:   No report of or nonverbal indications of pain. Swallow Mechanism Exam  Facial: symmetrical  Labial: WFL  Lingual: WFL  Velum: symmetrical  Mandible: adequate ROM  Dentition: natural with missing teeth  Vocal quality:clear/adequate   Volitional Cough: strong/productive   Respiratory Status: on RA       Consistencies Assessed and Performance   Consistencies Administered: thin liquids, puree, soft solids and hard solids      Oral Stage: WFL  Mastication was adequate with the materials administered today. Mildly slow movement but effective. Bolus formation and transfer were functional with no significant oral residue noted. No overt s/s reduced oral control. Pharyngeal Stage: WFL  Swallow Mechanics:  Swallowing initiation appeared prompt. Laryngeal rise was palpated and judged to be within functional limits. No coughing, throat clearing, change in vocal quality or respiratory status noted today. Esophageal Concerns: none reported    Strategies and Efficacy: -    Summary and Recommendations (see above)    Results Reviewed with: patient and RN     Treatment Recommended: not at this time.

## 2023-09-10 NOTE — ASSESSMENT & PLAN NOTE
Blood cultures x2 growing Enterobacter  Prelim ID panel Enterobacter  Continue cefepime-day 3  Unsure of the source- ?GI, no colonoscopy on record  ID consulted

## 2023-09-10 NOTE — PLAN OF CARE
Problem: Potential for Falls  Goal: Patient will remain free of falls  Description: INTERVENTIONS:  - Educate patient/family on patient safety including physical limitations  - Instruct patient to call for assistance with activity   - Consult OT/PT to assist with strengthening/mobility   - Keep Call bell within reach  - Keep bed low and locked with side rails adjusted as appropriate  - Keep care items and personal belongings within reach  - Initiate and maintain comfort rounds  - Make Fall Risk Sign visible to staff  - Offer Toileting every 2 Hours, in advance of need  - Initiate/Maintain bed alarm  - Obtain necessary fall risk management equipment: alarm  Problem: PAIN - ADULT  Goal: Verbalizes/displays adequate comfort level or baseline comfort level  Description: Interventions:  - Encourage patient to monitor pain and request assistance  - Assess pain using appropriate pain scale  - Administer analgesics based on type and severity of pain and evaluate response  - Implement non-pharmacological measures as appropriate and evaluate response  - Consider cultural and social influences on pain and pain management  - Notify physician/advanced practitioner if interventions unsuccessful or patient reports new pain  Outcome: Progressing     Problem: INFECTION - ADULT  Goal: Absence or prevention of progression during hospitalization  Description: INTERVENTIONS:  - Assess and monitor for signs and symptoms of infection  - Monitor lab/diagnostic results  - Monitor all insertion sites, i.e. indwelling lines, tubes, and drains  - Monitor endotracheal if appropriate and nasal secretions for changes in amount and color  - [place] appropriate cooling/warming therapies per order  - Administer medications as ordered  - Instruct and encourage patient and family to use good hand hygiene technique  - Identify and instruct in appropriate isolation precautions for identified infection/condition  Outcome: Progressing  Goal: Absence of fever/infection during neutropenic period  Description: INTERVENTIONS:  - Monitor WBC    Outcome: Progressing     - Apply yellow socks and bracelet for high fall risk patients  - Consider moving patient to room near nurses station  Outcome: Progressing

## 2023-09-10 NOTE — ASSESSMENT & PLAN NOTE
Present on admission  Cultures growing gram-negative bacteria, Enterobacter  Continue cefepime  Will consult ID

## 2023-09-10 NOTE — ASSESSMENT & PLAN NOTE
Patient is awake, alert, oriented x2  He had negative for acute intracranial abnormalities  Every 4 neurochecks  Delirium precautions

## 2023-09-10 NOTE — ASSESSMENT & PLAN NOTE
Disagreement between wife and spouse wishes, during previous admission regarding goals of care  Patient wife would like to change the code status to full code  Will consult Palliative again for goals of care

## 2023-09-10 NOTE — PROGRESS NOTES
[facility] [facility]  Progress Note  Name: Glenice Goldmann  MRN: [id number]  Unit/Bed#: -01 I Date of Admission: 9/8/2023   Date of Service: 9/10/2023 I Hospital Day: 2    Assessment/Plan   * Severe sepsis [facility])  Assessment & Plan  Present on admission  Cultures growing gram-negative bacteria, Enterobacter  Continue cefepime  Will consult ID    Goals of care, counseling/discussion  Assessment & Plan  Disagreement between wife and spouse wishes, during previous admission regarding goals of care  Patient wife would like to change the code status to full code  Will consult Palliative again for goals of care     Chronic indwelling Acosta catheter  Assessment & Plan  Patient had chronic indwelling Acosta catheter  Urology consulted-recommendations for [facility]  Patient's wife however does not want SPC. She stated someone would call her regarding the procedure, and no one talked to her. Without knowing the risks, she does not any procedure to be done. Gram-negative bacteremia  Assessment & Plan  Blood cultures x2 growing Enterobacter  Prelim ID panel Enterobacter  Continue cefepime-day 3  Unsure of the source- ?GI, no colonoscopy on record  ID consulted    Acute metabolic encephalopathy  Assessment & Plan  Patient is awake, alert, oriented x2  He had negative for acute intracranial abnormalities  Every 4 neurochecks  Delirium precautions    Chest wall mass  Assessment & Plan  Patient had fungating mass for about 3 years  Previous admission, patient was recommended to follow-up with surgical oncology outpatient. Wound culture growing Pseudomonas  Biopsy, I&D done bedside  Follow-up with pathology  Continue cefepime         VTE Pharmacologic Prophylaxis:   High Risk (Score >/= 5) - Pharmacological DVT Prophylaxis Ordered: heparin. Sequential Compression Devices Ordered. Patient Centered Rounds: I performed bedside rounds with nursing staff today.   Discussions with Specialists or Other Care Team Provider: IR, Nursing    Education and Discussions with Family / Patient: Updated  (wife) at bedside. Total Time Spent on Date of Encounter in care of patient: 45 minutes This time was spent on one or more of the following: performing physical exam; counseling and coordination of care; obtaining or reviewing history; documenting in the medical record; reviewing/ordering tests, medications or procedures; communicating with other healthcare professionals and discussing with patient's family/caregivers. Current Length of Stay: 2 day(s)  Current Patient Status: Inpatient   Certification Statement: The patient will continue to require additional inpatient hospital stay due to gram negative bacteremeia  Discharge Plan: Anticipate discharge in 24-48 hrs to pending PT eval    Code Status: Level 1 - Full Code    Subjective:   Denies complaints this AM  No acute overnight events    Objective:     Vitals:   Temp (24hrs), Av °F (36.7 °C), Min:96.8 °F (36 °C), Max:99.7 °F (37.6 °C)    Temp:  [96.8 °F (36 °C)-99.7 °F (37.6 °C)] 97 °F (36.1 °C)  HR:  [75-81] 81  Resp:  [18-24] 20  BP: (116-142)/(56-69) 135/65  SpO2:  [94 %-96 %] 96 %  Body mass index is 20.44 kg/m². Input and Output Summary (last 24 hours): Intake/Output Summary (Last 24 hours) at 9/10/2023 1801  Last data filed at 9/10/2023 1211  Gross per 24 hour   Intake 400 ml   Output 850 ml   Net -450 ml       Physical Exam:   Physical Exam  HENT:      Head: Normocephalic and atraumatic. Mouth/Throat:      Mouth: Mucous membranes are moist.      Pharynx: Oropharynx is clear. Eyes:      General: No scleral icterus. Right eye: No discharge. Left eye: No discharge. Conjunctiva/sclera: Conjunctivae normal.   Cardiovascular:      Rate and Rhythm: Normal rate. Pulmonary:      Effort: Pulmonary effort is normal. No respiratory distress. Skin:     General: Skin is warm and dry.       Capillary Refill: Capillary refill takes 2 to 3 seconds. Comments: Mass with bandage, discharge+  Hyperkeratosis   Neurological:      Mental Status: He is alert. Mental status is at baseline. Additional Data:     Labs:  Results from last 7 days   Lab Units 09/10/23  0321 09/09/23  0512 09/08/23  1552 09/08/23  1057   WBC Thousand/uL 6.28 12.45*   < > 22.57*   HEMOGLOBIN g/dL 7.7* 9.3*   < > 11.1*   HEMATOCRIT % 25.5* 30.5*   < > 35.1*   PLATELETS Thousands/uL 137* 130*   < > 231   BANDS PCT %  --  1   < >  --    NEUTROS PCT %  --   --   --  86*   LYMPHS PCT %  --   --   --  7*   LYMPHO PCT %  --  12*   < >  --    MONOS PCT %  --   --   --  6   MONO PCT %  --  0*   < >  --    EOS PCT %  --  3   < > 0    < > = values in this interval not displayed. Results from last 7 days   Lab Units 09/10/23  0321 09/08/23  1552 09/08/23  1057   SODIUM mmol/L 135   < > 126*   POTASSIUM mmol/L 4.1   < > 5.3   CHLORIDE mmol/L 108   < > 95*   CO2 mmol/L 24   < > 23   BUN mg/dL 22   < > 46*   CREATININE mg/dL 0.50*   < > 1.20   ANION GAP mmol/L 3   < > 8   CALCIUM mg/dL 8.5   < > 9.8   ALBUMIN g/dL  --   --  3.3*   TOTAL BILIRUBIN mg/dL  --   --  0.81   ALK PHOS U/L  --   --  58   ALT U/L  --   --  11   AST U/L  --   --  29   GLUCOSE RANDOM mg/dL 117   < > 148*    < > = values in this interval not displayed.      Results from last 7 days   Lab Units 09/08/23  1057   INR  1.15             Results from last 7 days   Lab Units 09/09/23 0512 09/08/23  1333 09/08/23  1057   LACTIC ACID mmol/L  --  1.9 3.0*   PROCALCITONIN ng/ml 0.76*  --  1.03*       Lines/Drains:  Invasive Devices     Peripheral Intravenous Line  Duration           Peripheral IV 09/08/23 Right;Ventral (anterior) Forearm 2 days    Peripheral IV 09/09/23 Distal;Right;Upper;Ventral (anterior) Arm 1 day          Drain  Duration           Urethral Catheter Double-lumen 16 Fr. 2 days              Urinary Catheter:  Goal for removal: N/A - Chronic Acosta               Imaging: Reviewed radiology reports from this admission including: abdominal/pelvic CT and CT head    Recent Cultures (last 7 days):   Results from last 7 days   Lab Units 09/08/23  1606 09/08/23  1605 09/08/23  1237 09/08/23  1057   BLOOD CULTURE   --   --   --  Enterobacter cloacae complex*  Enterobacter cloacae complex*   GRAM STAIN RESULT  1+ Gram negative rods*  No polys seen*  --   --  Gram negative rods*  Gram negative rods*   URINE CULTURE   --   --  30,000-39,000 cfu/ml  --    WOUND CULTURE  3+ Growth of Pseudomonas aeruginosa*  --   --   --    LEGIONELLA URINARY ANTIGEN   --  Negative  --   --        Last 24 Hours Medication List:   Current Facility-Administered Medications   Medication Dose Route Frequency Provider Last Rate   • aspirin  81 mg Oral Daily Haldey Villavicencio PA-C     • atorvastatin  20 mg Oral Daily With [facility] [facility] [place]     • cefepime  2,000 mg Intravenous Q12H Hadley Villavicencio PA-C 2,000 mg (09/10/23 1206)   • finasteride  5 mg Oral Daily Hadley Villavicencio PA-C     • heparin (porcine)  5,000 Units Subcutaneous [facility] [place]          Today, Patient Was Seen By: Luis Armando Bhakta MD    **Please Note: This note may have been constructed using a voice recognition system. **

## 2023-09-10 NOTE — ASSESSMENT & PLAN NOTE
Patient had fungating mass for about 3 years  Previous admission, patient was recommended to follow-up with surgical oncology outpatient.   Wound culture growing Pseudomonas  Biopsy, I&D done bedside  Follow-up with pathology  Continue cefepime

## 2023-09-10 NOTE — QUICK NOTE
Pt POD #1 s/p chest wall mass biopsy    Site is clean without excessive bleeding. Await path results.

## 2023-09-10 NOTE — NUTRITION
09/10/23 1131   Biochemical Data,Medical Tests, and Procedures   Biochemical Data/Medical Tests/Procedures Lab values reviewed; Meds reviewed   Labs (Comment) GFR 93   Meds (Comment) reviewed   Other Diagnostic/Procedures (Comment) POD #1 s/p chest wall mass biopsy   Speech Therapy Recommendations (Comment) pending   Nutrition-Focused Physical Exam   Nutrition-Focused Physical Exam Findings RN skin assessment reviewed   Nutrition-Focused Physical Exam Findings non-pitting BUE, +2 BLE edema, multiple wound, pressure injury to sacrum   Medical-Related Concerns hyperlipidemia, hypertension, fungating chest mass, 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 Patient had been in normal state of health but had increasing lethargy, difficult to arouse. Hypotensive in the emergency room, SBP in the 70s, admitted with severe sepsis   Adequacy of Intake   Nutrition Modality PO   Current PO Intake   Current Diet Order dysphagia 3 dental soft   Current Meal Intake %   Estimated Calorie Intake %   Estimated Protein Intake  50-75%   Estimated Fluid Intake %   Estimated calorie intake compared to estimated need good PO intake, likely not meeting increase protein needs for wound healing   PES Statement   Problem Intake   Nutrient (5) Increased nutrient needs (specify) NI-5.1  (energy, protein)   Related to Wound (s)   As evidenced by:  Wound healing   Recommendations/Interventions   Malnutrition/BMI Present No   Interventions/Recommendations Continue current diet order;Supplement initiate   Recommendations to Provider Will add Ensure Plus chocolate bid to optimize nutrition, promote wound healing; Monitor willingness to try Randal- pt declined at this time   Education Assessment Education Patient declined nutrition education   Patient Nutrition Goals   Goal Adequate intake; Improve skin integrity; Avoid weight loss   Goal Status Initiated   Timeframe to complete goal by next f/u   Nutrition Complexity Risk   Nutrition complexity level Moderate risk   Follow up date 09/17/23

## 2023-09-11 LAB
ALBUMIN SERPL BCP-MCNC: 2.4 G/DL (ref 3.5–5)
ALP SERPL-CCNC: 54 U/L (ref 34–104)
ALT SERPL W P-5'-P-CCNC: 15 U/L (ref 7–52)
ANION GAP SERPL CALCULATED.3IONS-SCNC: 2 MMOL/L
AST SERPL W P-5'-P-CCNC: 11 U/L (ref 13–39)
BACTERIA BLD CULT: ABNORMAL
BACTERIA BLD CULT: ABNORMAL
BACTERIA WND AEROBE CULT: ABNORMAL
BACTERIA WND AEROBE CULT: ABNORMAL
BILIRUB SERPL-MCNC: 0.33 MG/DL (ref 0.2–1)
BUN SERPL-MCNC: 19 MG/DL (ref 5–25)
CALCIUM ALBUM COR SERPL-MCNC: 9.6 MG/DL (ref 8.3–10.1)
CALCIUM SERPL-MCNC: 8.3 MG/DL (ref 8.4–10.2)
CHLORIDE SERPL-SCNC: 108 MMOL/L (ref 96–108)
CO2 SERPL-SCNC: 24 MMOL/L (ref 21–32)
CREAT SERPL-MCNC: 0.37 MG/DL (ref 0.6–1.3)
E CLOAC COMP DNA BLD POS NAA+NON-PROBE: DETECTED
ERYTHROCYTE [DISTWIDTH] IN BLOOD BY AUTOMATED COUNT: 14.9 % (ref 11.6–15.1)
GFR SERPL CREATININE-BSD FRML MDRD: 105 ML/MIN/1.73SQ M
GLUCOSE SERPL-MCNC: 120 MG/DL (ref 65–140)
GRAM STN SPEC: ABNORMAL
HCT VFR BLD AUTO: 25.4 % (ref 36.5–49.3)
HGB BLD-MCNC: 8 G/DL (ref 12–17)
MCH RBC QN AUTO: 29.5 PG (ref 26.8–34.3)
MCHC RBC AUTO-ENTMCNC: 31.5 G/DL (ref 31.4–37.4)
MCV RBC AUTO: 94 FL (ref 82–98)
PLATELET # BLD AUTO: 138 THOUSANDS/UL (ref 149–390)
PMV BLD AUTO: 9.8 FL (ref 8.9–12.7)
POTASSIUM SERPL-SCNC: 3.6 MMOL/L (ref 3.5–5.3)
PROT SERPL-MCNC: 5.4 G/DL (ref 6.4–8.4)
RBC # BLD AUTO: 2.71 MILLION/UL (ref 3.88–5.62)
SODIUM SERPL-SCNC: 134 MMOL/L (ref 135–147)
WBC # BLD AUTO: 5.46 THOUSAND/UL (ref 4.31–10.16)

## 2023-09-11 PROCEDURE — 99223 1ST HOSP IP/OBS HIGH 75: CPT | Performed by: PHYSICIAN ASSISTANT

## 2023-09-11 PROCEDURE — 87040 BLOOD CULTURE FOR BACTERIA: CPT | Performed by: PHYSICIAN ASSISTANT

## 2023-09-11 PROCEDURE — 99232 SBSQ HOSP IP/OBS MODERATE 35: CPT | Performed by: PHYSICIAN ASSISTANT

## 2023-09-11 PROCEDURE — 80053 COMPREHEN METABOLIC PANEL: CPT | Performed by: INTERNAL MEDICINE

## 2023-09-11 PROCEDURE — 85027 COMPLETE CBC AUTOMATED: CPT | Performed by: INTERNAL MEDICINE

## 2023-09-11 PROCEDURE — 99497 ADVNCD CARE PLAN 30 MIN: CPT | Performed by: PHYSICIAN ASSISTANT

## 2023-09-11 RX ORDER — CEFEPIME HYDROCHLORIDE 2 G/50ML
2000 INJECTION, SOLUTION INTRAVENOUS EVERY 8 HOURS
Status: DISCONTINUED | OUTPATIENT
Start: 2023-09-11 | End: 2023-09-14 | Stop reason: HOSPADM

## 2023-09-11 RX ORDER — METOPROLOL SUCCINATE 25 MG/1
25 TABLET, EXTENDED RELEASE ORAL DAILY
Status: DISCONTINUED | OUTPATIENT
Start: 2023-09-12 | End: 2023-09-14 | Stop reason: HOSPADM

## 2023-09-11 RX ADMIN — FINASTERIDE 5 MG: 5 TABLET, FILM COATED ORAL at 09:14

## 2023-09-11 RX ADMIN — HEPARIN SODIUM 5000 UNITS: 5000 INJECTION INTRAVENOUS; SUBCUTANEOUS at 14:23

## 2023-09-11 RX ADMIN — CEFEPIME HYDROCHLORIDE 2000 MG: 2 INJECTION, SOLUTION INTRAVENOUS at 18:29

## 2023-09-11 RX ADMIN — HEPARIN SODIUM 5000 UNITS: 5000 INJECTION INTRAVENOUS; SUBCUTANEOUS at 05:03

## 2023-09-11 RX ADMIN — ATORVASTATIN CALCIUM 20 MG: 20 TABLET, FILM COATED ORAL at 16:08

## 2023-09-11 RX ADMIN — ASPIRIN 81 MG CHEWABLE TABLET 81 MG: 81 TABLET CHEWABLE at 09:14

## 2023-09-11 RX ADMIN — CEFEPIME HYDROCHLORIDE 2000 MG: 2 INJECTION, SOLUTION INTRAVENOUS at 11:29

## 2023-09-11 RX ADMIN — HEPARIN SODIUM 5000 UNITS: 5000 INJECTION INTRAVENOUS; SUBCUTANEOUS at 22:01

## 2023-09-11 NOTE — ASSESSMENT & PLAN NOTE
· Antihypertensives initially held on admission due to hypotension/severe sepsis  · Blood pressure has since improved.   Will restart metoprolol at reduced dose of 25 mg and uptitrate as needed

## 2023-09-11 NOTE — ASSESSMENT & PLAN NOTE
· Blood cultures x2 growing Enterobacter  · Continue IV cefepime  · Appears urine may have been collected after initiation of antibiotic.   Urine culture growing mixed contaminants  · Follow-up repeat blood cultures x2  · ID consult pending

## 2023-09-11 NOTE — PLAN OF CARE
Problem: Potential for Falls  Goal: Patient will remain free of falls  Description: INTERVENTIONS:  - Educate patient/family on patient safety including physical limitations  - Instruct patient to call for assistance with activity   - Consult OT/PT to assist with strengthening/mobility   - Keep Call bell within reach  - Keep bed low and locked with side rails adjusted as appropriate  - Keep care items and personal belongings within reach  - Initiate and maintain comfort rounds  - Make Fall Risk Sign visible to staff  - Offer Toileting every 2 Hours, in advance of need  - Initiate/Maintain bed/ chair alarm  - Obtain necessary fall risk management equipment: walker  - Apply yellow socks and bracelet for high fall risk patients  - Consider moving patient to room near nurses station  Outcome: Progressing       Problem: PAIN - ADULT  Goal: Verbalizes/displays adequate comfort level or baseline comfort level  Description: Interventions:  - Encourage patient to monitor pain and request assistance  - Assess pain using appropriate pain scale  - Administer analgesics based on type and severity of pain and evaluate response  - Implement non-pharmacological measures as appropriate and evaluate response  - Consider cultural and social influences on pain and pain management  - Notify physician/advanced practitioner if interventions unsuccessful or patient reports new pain  Outcome: Progressing     Problem: INFECTION - ADULT  Goal: Absence or prevention of progression during hospitalization  Description: INTERVENTIONS:  - Assess and monitor for signs and symptoms of infection  - Monitor lab/diagnostic results  - Monitor all insertion sites, i.e. indwelling lines, tubes, and drains  - Monitor endotracheal if appropriate and nasal secretions for changes in amount and color  - [place] appropriate cooling/warming therapies per order  - Administer medications as ordered  - Instruct and encourage patient and family to use good hand hygiene technique  - Identify and instruct in appropriate isolation precautions for identified infection/condition  Outcome: Progressing  Goal: Absence of fever/infection during neutropenic period  Description: INTERVENTIONS:  - Monitor WBC    Outcome: Progressing

## 2023-09-11 NOTE — CASE MANAGEMENT
Case Management Assessment & Discharge Planning Note    Patient name Ivette Aguero  Location [address] [facility] [place] 220/-32 MRN [id number]  : 1929 Date 2023       Current Admission Date: 2023  Current Admission Diagnosis:Severe sepsis [facility])   Patient Active Problem List    Diagnosis Date Noted   • Goals of care, counseling/discussion 09/10/2023   • Chronic indwelling Acosta catheter 2023   • HLD (hyperlipidemia) 2023   • HTN (hypertension) 2023   • Transaminitis 2023   • Chest wall mass 2023   • L3 vertebral fracture ([address]) 2023   • Acute metabolic encephalopathy    • Gram-negative bacteremia 2023   • Severe sepsis ([address]) 2023   • Elevated troponin 2023      LOS (days): 3  Geometric Mean LOS (GMLOS) (days): 3.50  Days to GMLOS:0.6     OBJECTIVE:    Risk of Unplanned Readmission Score: 15.46         Current admission status: Inpatient       Preferred Pharmacy:   [facility] [address] MARTA PALACIOS [id number] [place] [address] [address] [address] [address] [address]  Phone: [phone] Fax: [phone]    Primary Care Provider: No primary care provider on file.     Primary Insurance: MEDICARE  Secondary Insurance: [address] [place] [address]    ASSESSMENT:  [address] [address]   Primary Phone: [phone] (Mobile)               Advance Directives  Does patient have a [address]?: Yes  Does patient have Advance Directives?: Yes  Advance Directives: Living will, Power of  for health care  Primary Contact: Zack Brown, spouse         Readmission Root Cause  30 Day Readmission: No    Patient Information  Admitted from[de-identified] Home  Mental Status: Alert  During Assessment patient was accompanied by: Not accompanied during assessment  Assessment information provided by[de-identified] Spouse  Primary Caregiver: Spouse  Caregiver's Name[de-identified] Zack Brown  Caregiver's Relationship to Patient[de-identified] Significant Other  Caregiver's Telephone Number[de-identified] [phone]  Support Systems: Spouse/significant other  [place] of Residence: [address] do you live in?: [address] [place] [address] Place entry access options.  Select all that apply.: Stairs  Number of steps to enter home.:  (1/2 step)  Type of Current Residence: [place]  In the last 12 months, was there a time when you were not able to pay the mortgage or rent on time?: No  In the last 12 months, how many places have you lived?: 1  In the last 12 months, was there a time when you did not have a steady place to sleep or slept in a shelter (including now)?: No  Homeless/housing insecurity resource given?: N/A  Living Arrangements: Lives w/ Spouse/significant other    Activities of Daily Living Prior to Admission  Functional Status: Assistance  Completes ADLs independently?: No  Level of ADL dependence: Assistance  Ambulates independently?: No  Level of ambulatory dependence: Assistance  Does patient use assisted devices?: Yes  Assisted Devices (DME) used: Norma Mosque, Wheelchair, Other (Comment) (RTS)  Does patient currently own DME?: Yes  What DME does the patient currently own?: Norma Mosque, Wheelchair, Other (Comment) (RTS)  Does patient have a history of Outpatient Therapy (PT/OT)?: No  Does the patient have a history of Short-Term Rehab?: Yes  Does patient have a history of HHC?: No  Does patient currently have [facility]?: No         Patient Information Continued  Income Source: Pension/jail  Does patient have prescription coverage?: Yes  Within the past 12 months, you worried that your food would run out before you got the money to buy more.: Never true  Within the past 12 months, the food you bought just didn't last and you didn't have money to get more.: Never true  Food insecurity resource given?: N/A  Does patient receive dialysis treatments?: No  Does patient have a history of substance abuse?: No  Does patient have a history of Mental Health Diagnosis?: No         Means of Transportation  Means of Transport to Appts[de-identified] Family transport  In the past 12 months, has lack of transportation kept you from medical appointments or from getting medications?: No  In the past 12 months, has lack of transportation kept you from meetings, work, or from getting things needed for daily living?: No  Was application for public transport provided?: N/A        DISCHARGE DETAILS:    Discharge planning discussed with[de-identified] Patient's spouse  Freedom of Choice: Yes     CM contacted family/caregiver?: Yes  Were Treatment Team discharge recommendations reviewed with patient/caregiver?: Yes  Did patient/caregiver verbalize understanding of patient care needs?: Yes  Were patient/caregiver advised of the risks associated with not following Treatment Team discharge recommendations?: Yes    Contacts  Patient Contacts: Kelsey Mann: wife  Relationship to Patient[de-identified] Family  Contact Method: Phone  Phone Number: [phone]  Reason/Outcome: Emergency Contact, Discharge [address]         Is the patient interested in [facility] at discharge?: No    DME Referral Provided  Referral made for DME?: No              Treatment Team Recommendation: Home  Discharge Destination Plan[de-identified] Home  Transport at Discharge : Family                                      Additional Comments: Met with pt and spouse about role of CM and any needs prior to discharge. Pt lives w/ spouse in ranch style home w/ 1/2 step to enter. Pt needs assistance with ambulation and ADLs. Pt owns/uses RW, WC, RTS. No hx OP PT/HH/DA/MH. HX STR. Spouse will transport at discharge.

## 2023-09-11 NOTE — CONSULTS
Consultation - Palliative and Supportive Care   Taurus Quesada 80 y.o. male [id number]    Assessment/Problems Actively Addressed:  Goals of care counseling  Encounter for palliative and supportive care   Severe sepsis  Gram negative bacteremia  Acute metabolic encephalopathy   Chest wall mass   Urinary retention / Chronic Acosta catheter    Plan:  1. Symptom management:  · Deferred to primary     2. Goals / Recommendations:    · Jennifer Aponte demonstrates a reasonable grasp of the patient's poor prognosis and is accepting that he is gradually approaching end-of-life. · For now, continue conservative medical management. Suprapubic catheter to be discussed as an OP. · Awaiting chest wall mass bx results. Pt has expressed that he is not interested in tx for this. · Level 1 code status - discussed extensively with wife/Kimberly. At this time, she elects to continue level 1 full code status however she will continue to think about it. She does not believe the pt would want resuscitation however is not ready to change code status and would like to make this decision after the code is initiated. She accepts risks of CPR and poor statistics of recovery. Continue to encourage her to think of pt's wishes and best interest.   · For now, palliative and supportive care will sign off however please reach out to the on-call provider if additional assistance is needed and we will gladly reengage. Social support:  • Time spent providing supportive listening. • Patient reports good support from his wife  • Validated pt and family experience. I have reviewed the patient's controlled substance dispensing history in the Prescription Drug Monitoring Program in compliance with the [facility] regulations before prescribing any controlled substances. Last refills - PDMP reviewed. Decisional apparatus:  Patient is not competent on exam today.   If competence is lost, patient's substitute decision maker would default to spouse by [place] Act 169. Advance Directive/Living Will/POLST: not on file. Pt confirms that spouse is POA. She has been asked to bring documents in. We appreciate the invitation to be involved in this patient's care. We will continue to follow throughout this hospitalization. Please do not hesitate to reach our on call provider through our clinic answering service at [phone] should you have acute symptom control concerns. Zarina Vickers PA-C  Palliative and Supportive Care  Clinic/Answering Service: [phone]  You can find me on [employer]! IDENTIFICATION:  Inpatient consult to Palliative Care  Consult performed by: Zarina Vickers PA-C  Consult ordered by: Obdulio Chu MD        Physician Requesting Consult: Sandra Pinto MD  Reason for Consult / Principal Problem: GOC counseling and SM secondary to sepsis, untreated chest wall mass. History of Present Illness:  Amparo Lopez is a 80 y.o. male who presents with unresponsive episode. 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 He had recent admission for UTI due to Klebsiella Enterococcus species. At home he had an unresponsive episode and wife brought him to the ED for evaluation. He was again found to have severe sepsis and septic shock and was admitted to the ICU for blood pressure support. The patient was initially DNR/DNI upon admission however his wife reversed this. Palliative and supportive care was consulted to facilitate goals of care discussions. Review of Systems:   Review of Systems   Unable to perform ROS: dementia   Constitutional: Positive for malaise/fatigue. Negative for decreased appetite. Cardiovascular: Negative for chest pain. Gastrointestinal: Negative for bloating and abdominal pain. Neurological: Positive for weakness.        Past Medical History:   Diagnosis Date   • Acute metabolic encephalopathy 7/6/2023   • Coronary artery disease    • Diabetes mellitus ([address] [facility])    • Renal disorder      Past Surgical History:   Procedure Laterality Date   • CORONARY ANGIOPLASTY WITH STENT PLACEMENT       Social History     Socioeconomic History   • Marital status: /Civil Union     Spouse name: Not on file   • Number of children: Not on file   • Years of education: Not on file   • Highest education level: Not on file   Occupational History   • Not on file   Tobacco Use   • Smoking status: Former     Types: Cigarettes   • Smokeless tobacco: Never   Vaping Use   • Vaping Use: Never used   Substance and Sexual Activity   • Alcohol use: Never   • Drug use: Never   • Sexual activity: Not on file   Other Topics Concern   • Not on file   Social History Narrative   • Not on file     Social Determinants of Health     Financial Resource Strain: Not on file   Food Insecurity: No Food Insecurity (9/11/2023)    Hunger Vital Sign    • Worried About Running Out of Food in the Last Year: Never true    • Ran Out of Food in the Last Year: Never true   Transportation Needs: No Transportation Needs (9/11/2023)    PRAPARE - Transportation    • Lack of Transportation (Medical): No    • Lack of Transportation (Non-Medical): No   Physical Activity: Not on file   Stress: Not on file   Social Connections: Not on file   Intimate Partner Violence: Not on file   Housing Stability: Low Risk  (9/11/2023)    Housing Stability Vital Sign    • Unable to Pay for Housing in the Last Year: No    • Number of Places Lived in the Last Year: 1    • Unstable Housing in the Last Year: No     History reviewed. No pertinent family history.     Medications:  all current active meds have been reviewed and current meds:   Current Facility-Administered Medications   Medication Dose Route Frequency   • aspirin chewable tablet 81 mg  81 mg Oral Daily   • atorvastatin (LIPITOR) tablet 20 mg  20 mg Oral Daily With Dinner   • cefepime (MAXIPIME) IVPB (premix in dextrose) 2,000 mg 50 mL  2,000 mg Intravenous Q8H   • finasteride (PROSCAR) tablet 5 mg  5 mg Oral Daily   • heparin (porcine) subcutaneous injection 5,000 Units  5,000 Units Subcutaneous Q8H [address]   • [START ON 9/12/2023] metoprolol succinate (TOPROL-XL) 24 hr tablet 25 mg  25 mg Oral Daily       Allergies   Allergen Reactions   • Levaquin [Levofloxacin] Confusion         Medications    Current Facility-Administered Medications:   •  aspirin chewable tablet 81 mg, 81 mg, Oral, Daily, Selvin Grubbs PA-C, 81 mg at 09/11/23 4191  •  atorvastatin (LIPITOR) tablet 20 mg, 20 mg, Oral, Daily With Dinner, Selvin Grubbs PA-C, 20 mg at 09/10/23 1603  •  cefepime (MAXIPIME) IVPB (premix in dextrose) 2,000 mg 50 mL, 2,000 mg, Intravenous, Q8H, Chadwick Davey PA-C, Last Rate: 100 mL/hr at 09/11/23 1129, 2,000 mg at 09/11/23 1129  •  finasteride (PROSCAR) tablet 5 mg, 5 mg, Oral, Daily, Selvin Grubbs PA-C, 5 mg at 09/11/23 1617  •  heparin (porcine) subcutaneous injection 5,000 Units, 5,000 Units, Subcutaneous, Q8H [address], Selvin Grubbs PA-C, 5,000 Units at 09/11/23 0503  •  [START ON 9/12/2023] metoprolol succinate (TOPROL-XL) 24 hr tablet 25 mg, 25 mg, Oral, Daily, Matilde Lopez PA-C    Objective  /65   Pulse 68   Temp (!) 96.8 °F (36 °C)   Resp 18   Ht 5' 7" (1.702 m)   Wt 60 kg (132 lb 4.4 oz)   SpO2 98%   BMI 20.72 kg/m²     Physical Exam  Vitals and nursing note reviewed. Exam conducted with a chaperone present. Constitutional:       General: He is sleeping. He is not in acute distress. Appearance: He is ill-appearing. Comments: Pleasant. Hard of hearing. Returns to sleep quickly however easily aroused. Believes the year is 0 and cannot name the month. He is aware he is in the [address] [place] [address] Street:      Head: Normocephalic and atraumatic. Eyes:      Conjunctiva/sclera: Conjunctivae normal.   Cardiovascular:      Rate and Rhythm: Regular rhythm.    Pulmonary: Effort: Pulmonary effort is normal. No respiratory distress. Abdominal:      Tenderness: There is no abdominal tenderness. Musculoskeletal:         General: No swelling. Cervical back: Neck supple. Skin:     General: Skin is warm and dry. Capillary Refill: Capillary refill takes less than 2 seconds. Coloration: Skin is pale. Findings: Lesion present. Neurological:      Mental Status: He is easily aroused. He is disoriented. Psychiatric:         Mood and Affect: Mood normal.       Lab Results:   I have personally reviewed pertinent labs. , CBC:   Lab Results   Component Value Date    WBC 5.46 09/11/2023    HGB 8.0 (L) 09/11/2023    HCT 25.4 (L) 09/11/2023    MCV 94 09/11/2023     (L) 09/11/2023    RBC 2.71 (L) 09/11/2023    MCH 29.5 09/11/2023    MCHC 31.5 09/11/2023    RDW 14.9 09/11/2023    MPV 9.8 09/11/2023   , CMP:   Lab Results   Component Value Date    SODIUM 134 (L) 09/11/2023    K 3.6 09/11/2023     09/11/2023    CO2 24 09/11/2023    BUN 19 09/11/2023    CREATININE 0.37 (L) 09/11/2023    CALCIUM 8.3 (L) 09/11/2023    AST 11 (L) 09/11/2023    ALT 15 09/11/2023    ALKPHOS 54 09/11/2023    EGFR 105 09/11/2023     Imaging Studies: I have personally reviewed pertinent reports. EKG, Pathology, and Other Studies: I have personally reviewed pertinent reports. Counseling / Coordination of Care  Total floor / unit time spent today 90 minutes. Greater than 50% of total time was spent with the patient and / or family counseling and / or coordination of care.  A description of the counseling / coordination of care: reviewed chart, reviewed lab values, reviewed imaging, provided medical updates, discussed palliative care and symptom management, discussed goals of care, discussed code status, discussed advanced directives, assessed POA/HCA, provided supportive listening, provided anticipatory guidance, provided psychosocial and emotional support, assessed competency and decision-making and facilitated interdisciplinary communication. Reviewed with SLIM team, RN and CM. Portions of this document may have been created using dictation software and as such some "sound alike" terms may have been generated by the system. Do not hesitate to contact me with any questions or clarifications.

## 2023-09-11 NOTE — QUICK NOTE
Aware of request for ID consult. Pt remains afebrile and WBC count has decreased to nml. Bld cx's grew  Enterobacter. Left chest wound cx grew Pseudomonas. It appears that Pt received dose of Cefepime before urine specimen was collected which could affect the cx results since there was no growth.     - Cont Cefepime  - Full consult will be done by Dr. Bc Solis in AM

## 2023-09-11 NOTE — PROGRESS NOTES
[facility]  Progress Note  Name: Iain Chi  MRN: [id number]  Unit/Bed#: -01 I Date of Admission: 9/8/2023   Date of Service: 9/11/2023 I Hospital Day: 3    Assessment/Plan   * Severe sepsis [facility])  Assessment & Plan  · Patient met severe sepsis criteria with leukocytosis, tachypnea with lactic acidosis and initial hypotension improved s/p IVF  · In setting of Enterobacter bacteremia  · Continues on IV cefepime -chest wound culture growing Pseudomonas. Urine culture with mixed contaminants however appear this may have been obtained following initiation of antibiotics  · Repeat blood cultures pending x2  · Afebrile. Leukocytosis resolved  · Trend WBC and fever curve  · Consult ID    Goals of care, counseling/discussion  Assessment & Plan  · Discussed with palliative care AP and wife via phone 9/11  · Patient states he defers medical decision making to his wife at this time although is somewhat confused  · Wife confirms level 1 full code at this time - please see ACP note    HTN (hypertension)  Assessment & Plan  · Antihypertensives initially held on admission due to hypotension/severe sepsis  · Blood pressure has since improved. 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 Can consider suprapubic catheter. Initially was ordered as inpatient but wife requests to defer as outpatient at this time. Urology in agreement and will follow up as outpt    Gram-negative bacteremia  Assessment & Plan  · Blood cultures x2 growing Enterobacter  · Continue IV cefepime  · Appears urine may have been collected after initiation of antibiotic.   Urine culture growing mixed contaminants  · Follow-up repeat blood cultures x2  · ID consult pending    Acute metabolic encephalopathy  Assessment & Plan  · Patient with reported increased lethargy on admission  · Patient currently awake, alert and oriented x2  · CT head negative for acute intracranial abnormalities  · Suspect in setting of bacteremia  · Appears to be near baseline mentation - likely has some cognitive deficits at baseline    Chest wall mass  Assessment & Plan  · Patient had fungating mass for about 3 years  · Previous admission, patient was recommended to follow-up with surgical oncology outpatient. · Wound culture growing Pseudomonas  · Bedside biopsy completed by general surgery  · Follow-up pathology -pending  · Continue cefepime  · Consult wound care        VTE Pharmacologic Prophylaxis: VTE Score: 5 High Risk (Score >/= 5) - Pharmacological DVT Prophylaxis Ordered: heparin. Sequential Compression Devices Ordered. Patient Centered Rounds: I performed bedside rounds with nursing staff today. Discussions with Specialists or Other Care Team Provider: CM, palliative care    Education and Discussions with Family / Patient: Updated  (wife) via phone. Total Time Spent on Date of Encounter in care of patient: 45 minutes This time was spent on one or more of the following: performing physical exam; counseling and coordination of care; obtaining or reviewing history; documenting in the medical record; reviewing/ordering tests, medications or procedures; communicating with other healthcare professionals and discussing with patient's family/caregivers. Current Length of Stay: 3 day(s)  Current Patient Status: Inpatient   Certification Statement: The patient will continue to require additional inpatient hospital stay due to Bacteremia  Discharge Plan: Anticipate discharge in 48-72 hrs to home with home services. Code Status: Level 1 - Full Code    Subjective:   Patient appears pleasantly confused. He is aware he is in the hospital due to an infection. Denies chest pain/palpitations, shortness of breath, nausea/vomiting, abdominal pain, fever/chills. Objective:     Vitals:   Temp (24hrs), Av.9 °F (36.1 °C), Min:96.8 °F (36 °C), Max:97 °F (36.1 °C)    Temp:  [96.8 °F (36 °C)-97 °F (36.1 °C)] 96.8 °F (36 °C)  HR:  [68-81] 68  Resp:  [18-20] 18  BP: (135-159)/(65) 159/65  SpO2:  [96 %-98 %] 98 %  Body mass index is 20.72 kg/m². Input and Output Summary (last 24 hours): Intake/Output Summary (Last 24 hours) at 2023 1407  Last data filed at 2023 0719  Gross per 24 hour   Intake 460 ml   Output 850 ml   Net -390 ml       Physical Exam:   Physical Exam  Vitals and nursing note reviewed. Constitutional:       General: He is not in acute distress. Appearance: He is underweight. Comments: No acute distress   HENT:      Head: Normocephalic and atraumatic. Eyes:      General: No scleral icterus. Extraocular Movements: Extraocular movements intact. Conjunctiva/sclera: Conjunctivae normal.   Cardiovascular:      Rate and Rhythm: Normal rate and regular rhythm. Heart sounds: No murmur heard. Pulmonary:      Effort: Pulmonary effort is normal. No respiratory distress. Breath sounds: Normal breath sounds. No wheezing, rhonchi or rales. Abdominal:      Palpations: Abdomen is soft. Tenderness: There is no abdominal tenderness. Musculoskeletal:         General: No swelling. Cervical back: Normal range of motion. Comments: Able to move upper/lower extremities, no edema   Skin:     General: Skin is warm and dry. Capillary Refill: Capillary refill takes less than 2 seconds. Comments: Left sided chest wall mass   Neurological:      Mental Status: He is alert. Mental status is at baseline.    Psychiatric:         Mood and Affect: Mood normal.          Additional Data:     Labs:  Results from last 7 days   Lab Units 23  0512 09/10/23  0321 23  0512 23  1552 23  1057   WBC Thousand/uL 5.46   < > 12.45*   < > 22.57*   HEMOGLOBIN g/dL 8.0*   < > 9.3*   < > 11.1*   HEMATOCRIT % 25.4*   < > 30.5*   < > 35.1*   PLATELETS Thousands/uL 138*   < > 130*   < > 231   BANDS PCT %  --   --  1   < >  --    NEUTROS PCT %  --   --   --   --  86*   LYMPHS PCT %  --   --   --   --  7*   LYMPHO PCT %  --   --  12*   < >  --    MONOS PCT %  --   --   --   --  6   MONO PCT %  --   --  0*   < >  --    EOS PCT %  --   --  3   < > 0    < > = values in this interval not displayed. Results from last 7 days   Lab Units 09/11/23  0512   SODIUM mmol/L 134*   POTASSIUM mmol/L 3.6   CHLORIDE mmol/L 108   CO2 mmol/L 24   BUN mg/dL 19   CREATININE mg/dL 0.37*   ANION GAP mmol/L 2   CALCIUM mg/dL 8.3*   ALBUMIN g/dL 2.4*   TOTAL BILIRUBIN mg/dL 0.33   ALK PHOS U/L 54   ALT U/L 15   AST U/L 11*   GLUCOSE RANDOM mg/dL 120     Results from last 7 days   Lab Units 09/08/23  1057   INR  1.15             Results from last 7 days   Lab Units 09/09/23  0512 09/08/23  1333 09/08/23  1057   LACTIC ACID mmol/L  --  1.9 3.0*   PROCALCITONIN ng/ml 0.76*  --  1.03*       Lines/Drains:  Invasive Devices     Peripheral Intravenous Line  Duration           Peripheral IV 09/08/23 Right;Ventral (anterior) Forearm 2 days    Peripheral IV 09/09/23 Distal;Right;Upper;Ventral (anterior) Arm 2 days          Drain  Duration           Urethral Catheter Double-lumen 16 Fr. 3 days              Urinary Catheter:  Goal for removal: N/A - Chronic Acosta               Imaging: Reviewed radiology reports from this admission including: chest CT scan and abdominal/pelvic CT    Recent Cultures (last 7 days):   Results from last 7 days   Lab Units 09/11/23  0826 09/08/23  1606 09/08/23  1605 09/08/23  1237 09/08/23  1057   BLOOD CULTURE  Received in Microbiology Lab. Culture in Progress. Received in Microbiology Lab. Culture in Progress.   --   --   --  Enterobacter cloacae*  Enterobacter cloacae complex*   GRAM STAIN RESULT   --  1+ Gram negative rods*  No polys seen*  --   --  Gram negative rods*  Gram negative rods*   URINE CULTURE   --   --   --  30,000-39,000 cfu/ml  --    WOUND CULTURE   --  3+ Growth of Pseudomonas aeruginosa*  1+ Growth of  --   --   --    LEGIONELLA URINARY ANTIGEN   --   --  Negative  --   --        Last 24 Hours Medication List:   Current Facility-Administered Medications   Medication Dose Route Frequency Provider Last Rate   • aspirin  81 mg Oral Daily Caridad Spurling, PA-C     • atorvastatin  20 mg Oral Daily With [address]     • cefepime  2,000 mg Intravenous Q8H Clyde Pilot Juana PA-C 2,000 mg (09/11/23 1129)   • finasteride  5 mg Oral Daily Caridad Spurling, PA-C     • heparin (porcine)  5,000 Units Subcutaneous [facility] Caridad Spurling, PA-C     • [START ON 9/12/2023] metoprolol succinate  25 mg Oral Daily John Cullen PA-C          Today, Patient Was Seen By: John Cullen PA-C    **Please Note: This note may have been constructed using a voice recognition system. **

## 2023-09-11 NOTE — ACP (ADVANCE CARE PLANNING)
Advanced Care Planning Progress Note    Serious Illness Conversation    1. What is your understanding now of where you are with your illness? Prognostic Understanding: appropriate understanding of prognosis     2. If you become sicker, how much are you willing to go through for the possibility of gaining more time? Be in the hospital: Yes    Be in the ICU: Yes    Be on a ventilator: Yes    3. How much does your proxy and family know about your priorities and wishes? Discussion Discussion: extensive discussion with family about goals and wishes     Discussed with patient's wife Tod Favre via phone regarding goals of care with palliative care AP present. Chart review completed with wife via phone. Per report patient was initially listed as DNR/DNI and yesterday wife requested full code status. Wife also had reported wanting to postpone suprapubic catheter placement at this time which urology was in agreement. Attempted to discuss with patient who appears to have some cognitive deficit at baseline but patient clearly states he defers medical decision making to his wife, Tod Favre. Discussed in detail with assistance of palliative care regarding what a resuscitation effort would entail to which Tod Favre verbalized understanding. Given that patient is currently stable, she does not wish to adjust his code status at this time. Should patient suffer cardiac or respiratory arrest, Tod Favre requests phone call from staff to discuss whether to continue aggressive measures at that point. Current goal is clear to continue treatment of infection with ultimate discharge to home. Per Kimberly's prior discussion with patient he would not be interested in rehab on discharge should this be recommended.     I have spent 20 minutes speaking with my patient's wife/POA via phone per patient request on advanced care planning today or during this visit      Cha Duvall PA-C

## 2023-09-11 NOTE — ASSESSMENT & PLAN NOTE
· Patient had fungating mass for about 3 years  · Previous admission, patient was recommended to follow-up with surgical oncology outpatient.   · Wound culture growing Pseudomonas  · Bedside biopsy completed by general surgery  · Follow-up pathology -pending  · Continue cefepime  · Consult wound care

## 2023-09-11 NOTE — WOUND OSTOMY CARE
Consult Note - Wound   Ed Simon 80 y.o. male MRN: [id number]  Unit/Bed#: -01 Encounter: [id number]        History and Present Illness:  Patient is 79 yo male admitted to [address] [place] [address] with severe sepsis. Patient has history of HLD and HTN. Patient is incontinent of bowel and has chronic conde catheter with erosion. Patient is min assist with turning from side to side for assessment. Patient is independent with meals. Assessment Findings:   Sacral/buttocks and B/L heels intact and blanching, preventative skin care orders placed. 1. POA stage II right buttocks- partial thickness skin loss with pink tissue, periwound skin is pink, scant serosanguineous drainage. 2. POA wound to back- wound of unclear etiology of partial thickness skin loss with pink tissue, periwound skin is fragile, small serosanguineous drainage present. 3. 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 No induration, fluctuance, odor, warmth/temperature differences, redness, or purulence noted to the above noted wounds and skin areas assessed. New dressings applied per orders listed below. Patient tolerated well- no s/s of non-verbal pain or discomfort observed during the encounter. Bedside nurse aware of plan of care. See flow sheets for more detailed assessment findings. Wound care will continue to follow. Skin care Plan:  1-Cleanse sacro-buttocks and back with soap and water. Apply Xeroform gauze to wound bed and cover with Allevyn foam. Michele with T for treatment. Change every other day and PRN. 2-Turn/reposition q2h or when medically stable for pressure re-distribution on skin . 3-Elevate heels to offload pressure  4-Moisturize skin daily with skin nourishing cream  5-Ehob cushion in chair when out of bed. 6-Hydraguard to bilateral heels BID and PRN.     7-Cleanse left chest wound with normal saline, Apply maxorb AG to wound bed, cover with large sacral Allevyn foam. Change every other day and PRN Soilage/displacement. Wounds:  Wound 07/05/23 Chest Left (Active)   Wound Image   09/11/23 1052   Wound Description Hypergranulation;Pink;Yellow 09/11/23 1052   Katherine-wound Assessment Fragile 09/11/23 1052   Wound Length (cm) 7.5 cm 09/11/23 1052   Wound Width (cm) 9 cm 09/11/23 1052   Wound Depth (cm) 0.2 cm 09/11/23 1052   Wound Surface Area (cm^2) 67.5 cm^2 09/11/23 1052   Wound Volume (cm^3) 13.5 cm^3 09/11/23 1052   Calculated Wound Volume (cm^3) 13.5 cm^3 09/11/23 1052   Change in Wound Size % -33.93 09/11/23 1052   Tunneling 0 cm 09/11/23 1052   Tunneling in depth located at 0 09/11/23 1052   Undermining 0 09/11/23 1052   Undermining is depth extending from 0 09/11/23 1052   Wound Site Closure SAVANAH 09/11/23 1052   Drainage Amount Moderate 09/11/23 1052   Drainage Description Serosanguineous 09/11/23 1052   Non-staged Wound Description Full thickness 09/11/23 1052   Treatments Cleansed 09/11/23 1052   Dressing Foam, Silicon (eg. Allevyn, etc); Calcium Alginate with Silver 09/11/23 1052   Wound packed? No 09/11/23 1052   Packing- # removed 0 09/11/23 1052   Packing- # inserted 0 09/11/23 1052   Dressing Changed Changed 09/11/23 1052   Patient Tolerance Tolerated well 09/11/23 1052   Dressing Status Clean;Dry; Intact 09/11/23 1052       Wound 07/05/23 Pressure Injury Sacrum Right (Active)   Wound Image   09/11/23 1055   Wound Description Granulation tissue 09/11/23 1055   Pressure Injury Stage 2 09/11/23 1055   Katherine-wound Assessment Fragile;Pink 09/11/23 1055   Wound Length (cm) 0.2 cm 09/11/23 1055   Wound Width (cm) 0.2 cm 09/11/23 1055   Wound Depth (cm) 0.1 cm 09/11/23 1055   Wound Surface Area (cm^2) 0.04 cm^2 09/11/23 1055   Wound Volume (cm^3) 0.004 cm^3 09/11/23 1055   Calculated Wound Volume (cm^3) 0 cm^3 09/11/23 1055   Change in Wound Size % 100 09/11/23 1055   Tunneling 0 cm 09/11/23 1055   Tunneling in depth located at 0 09/11/23 1055   Undermining 0 09/11/23 1055   Undermining is depth extending from 0 09/11/23 1055   Wound Site Closure SAVANAH 09/11/23 1055   Drainage Amount Scant 09/11/23 1055   Drainage Description Serosanguineous 09/11/23 1055   Non-staged Wound Description Partial thickness 09/11/23 1055   Treatments Cleansed 09/11/23 1055   Dressing Foam, Silicon (eg. Allevyn, etc) 09/11/23 1055   Wound packed? No 09/11/23 1055   Packing- # removed 0 09/11/23 1055   Packing- # inserted 0 09/11/23 1055   Dressing Changed New 09/11/23 1055   Patient Tolerance Tolerated well 09/11/23 1055   Dressing Status Clean; Intact;Dry 09/11/23 1055       Wound 07/05/23 Back Bilateral (Active)   Wound Image   09/11/23 1056   Wound Description Beefy red;Epithelialization;Fragile 09/11/23 1056   Katherine-wound Assessment Clean;Dry; Intact 09/11/23 1056   Wound Length (cm) 2 cm 09/11/23 1056   Wound Width (cm) 3 cm 09/11/23 1056   Wound Depth (cm) 0.1 cm 09/11/23 1056   Wound Surface Area (cm^2) 6 cm^2 09/11/23 1056   Wound Volume (cm^3) 0.6 cm^3 09/11/23 1056   Calculated Wound Volume (cm^3) 0.6 cm^3 09/11/23 1056   Change in Wound Size % 86.96 09/11/23 1056   Tunneling 0 cm 09/11/23 1056   Tunneling in depth located at 0 09/11/23 1056   Undermining 0 09/11/23 1056   Undermining is depth extending from 0 09/11/23 1056   Wound Site Closure SAVANAH 09/11/23 1056   Drainage Amount Small 09/11/23 1056   Drainage Description Serosanguineous 09/11/23 1056   Non-staged Wound Description Partial thickness 09/11/23 1056   Treatments Cleansed 09/11/23 1056   Dressing Foam, Silicon (eg. Allevyn, etc) 09/11/23 1056   Wound packed? No 09/11/23 1056   Packing- # removed 0 09/11/23 1056   Packing- # inserted 0 09/11/23 1056   Dressing Changed New 09/11/23 1056   Patient Tolerance Tolerated well 09/11/23 1056   Dressing Status Clean;Dry; Intact 09/11/23 1056           Melody MARTINEZN, RN, [employer]

## 2023-09-11 NOTE — ASSESSMENT & PLAN NOTE
· Patient with reported increased lethargy on admission  · Patient currently awake, alert and oriented x2  · CT head negative for acute intracranial abnormalities  · Suspect in setting of bacteremia  · Appears to be near baseline mentation - likely has some cognitive deficits at baseline

## 2023-09-11 NOTE — ASSESSMENT & PLAN NOTE
· Discussed with palliative care AP and wife via phone 9/11  · Patient states he defers medical decision making to his wife at this time although is somewhat confused  · Wife confirms level 1 full code at this time - please see ACP note

## 2023-09-11 NOTE — ACP (ADVANCE CARE PLANNING)
Advanced Care Planning Progress Note    Serious Illness Conversation    1. What is your understanding now of where you are with your illness? Prognostic Understanding: appropriate understanding of prognosis  The patient and wife/Kimberly demonstrate good acceptance of poor long term prognosis. The patient is not fully competent and defers decision-making to Nana Rubinstein. 2. How much information about what is likely to be ahead with your illness would you like to have? Information: patient wants to be informed of big picture, but not details  The patient requests general updates, however he defers specifics to his wife. Nana Rubinstein would like to be fully informed. 3. What did you (clinician) communicate to the patient? Prognostic Communication: Uncertain - It can be difficult to predict what will happen with your illness. I hope you will continue to live well for a long time but I’m worried that you could get sick quickly, and I think it is important to prepare for that possibility. Explained that pt will likely have recurrent infections requiring repeat hospitalizations. 4. If your health situation worsens, what are your most important goals? Goals: have my medical decisions respected, be at home, be physically comfortable     5. What are the biggest fears and worries about the future and your health? Fears/Worries: being an emotional burden, burdening others     6. What abilities are so critical to your life that you cannot imagine living without them? Unacceptable Function: being unable to interact with others     7. What gives you strength as you think about the future with your illness? 8. If you become sicker, how much are you willing to go through for the possibility of gaining more time?   Be in the hospital: Yes    Be in the ICU: Yes Live in a nursing home: No   Be on a ventilator: Yes    Estella Rubinstein would like to maintain level 1 full code however she will continue to consider this as she knows the pt would not want long -term intubation. 9. How much does your proxy and family know about your priorities and wishes? Discussion Discussion: extensive discussion with family about goals and wishes      I have spent 25 minutes speaking with my patient on advanced care planning today or during this visit     Advanced directives - Addie Rowley reports that his wife is POA. Requested that she bring in official documentation.           Cody Solorzano PA-C

## 2023-09-11 NOTE — ASSESSMENT & PLAN NOTE
· Patient met severe sepsis criteria with leukocytosis, tachypnea with lactic acidosis and initial hypotension improved s/p IVF  · In setting of Enterobacter bacteremia  · Continues on IV cefepime -chest wound culture growing Pseudomonas. Urine culture with mixed contaminants however appear this may have been obtained following initiation of antibiotics  · Repeat blood cultures pending x2  · Afebrile.   Leukocytosis resolved  · Trend WBC and fever curve  · Consult ID

## 2023-09-11 NOTE — DISCHARGE INSTR - OTHER ORDERS
Skin care Plan:  1-Cleanse sacro-buttocks and back with soap and water. Apply Xeroform gauze to wound bed and cover with Allevyn foam. Michele with T for treatment. Change every other day and PRN. 2-Turn/reposition q2h or when medically stable for pressure re-distribution on skin . 3-Elevate heels to offload pressure  4-Moisturize skin daily with skin nourishing cream  5-Ehob cushion in chair when out of bed. 6-Hydraguard to bilateral heels BID and PRN. 7-Cleanse left chest wound with normal saline, Apply maxorb AG to wound bed, cover with large sacral Allevyn foam. Change every other day and PRN Soilage/displacement.

## 2023-09-11 NOTE — PLAN OF CARE
Problem: Potential for Falls  Goal: Patient will remain free of falls  Description: INTERVENTIONS:  - Educate patient/family on patient safety including physical limitations  - Instruct patient to call for assistance with activity   - Consult OT/PT to assist with strengthening/mobility   - Keep Call bell within reach  - Keep bed low and locked with side rails adjusted as appropriate  - Keep care items and personal belongings within reach  - Initiate and maintain comfort rounds  - Make Fall Risk Sign visible to staff  - Offer Toileting every 2 Hours, in advance of need  - Initiate/Maintain bed/ chair alarm  - Obtain necessary fall risk management equipment: walker  - Apply yellow socks and bracelet for high fall risk patients  - Consider moving patient to room near nurses station  Outcome: Progressing     Problem: MOBILITY - ADULT  Goal: Maintain or return to baseline ADL function  Description: INTERVENTIONS:  - Educate patient/family on patient safety including physical limitations  - Instruct patient to call for assistance with activity   - Consult OT/PT to assist with strengthening/mobility   - Keep Call bell within reach  - Keep bed low and locked with side rails adjusted as appropriate  - Keep care items and personal belongings within reach  - Initiate and maintain comfort rounds  - Make Fall Risk Sign visible to staff  - Offer Toileting every 2 Hours, in advance of need  - Initiate/Maintain bed/ chair alarm  - Obtain necessary fall risk management equipment: n/a  - Apply yellow socks and bracelet for high fall risk patients  - Consider moving patient to room near nurses station  Outcome: Progressing  Goal: Maintains/Returns to pre admission functional level  Description: INTERVENTIONS:  - Perform BMAT or MOVE assessment daily.   - Set and communicate daily mobility goal to care team and patient/family/caregiver.    - Collaborate with rehabilitation services on mobility goals if consulted  - Perform Range of Motion 3 times a day. - Reposition patient every 2 hours.   - Dangle patient 3 times a day  - Stand patient 3 times a day  - Ambulate patient 3 times a day  - Out of bed to chair 3 times a day   - Out of bed for meals 3 times a day  - Out of bed for toileting  - Record patient progress and toleration of activity level   Outcome: Progressing     Problem: Prexisting or High Potential for Compromised Skin Integrity  Goal: Skin integrity is maintained or improved  Description: INTERVENTIONS:  - Identify patients at risk for skin breakdown  - Assess and monitor skin integrity  - Assess and monitor nutrition and hydration status  - Monitor labs   - Assess for incontinence   - Turn and reposition patient  - Assist with mobility/ambulation  - Relieve pressure over bony prominences  - Avoid friction and shearing  - Provide appropriate hygiene as needed including keeping skin clean and dry  - Evaluate need for skin moisturizer/barrier cream  - Collaborate with interdisciplinary team   - Patient/family teaching  - Consider wound care consult   Outcome: Progressing     Problem: PAIN - ADULT  Goal: Verbalizes/displays adequate comfort level or baseline comfort level  Description: Interventions:  - Encourage patient to monitor pain and request assistance  - Assess pain using appropriate pain scale  - Administer analgesics based on type and severity of pain and evaluate response  - Implement non-pharmacological measures as appropriate and evaluate response  - Consider cultural and social influences on pain and pain management  - Notify physician/advanced practitioner if interventions unsuccessful or patient reports new pain  Outcome: Progressing     Problem: INFECTION - ADULT  Goal: Absence or prevention of progression during hospitalization  Description: INTERVENTIONS:  - Assess and monitor for signs and symptoms of infection  - Monitor lab/diagnostic results  - Monitor all insertion sites, i.e. indwelling lines, tubes, and drains  - Monitor endotracheal if appropriate and nasal secretions for changes in amount and color  - [place] appropriate cooling/warming therapies per order  - Administer medications as ordered  - Instruct and encourage patient and family to use good hand hygiene technique  - Identify and instruct in appropriate isolation precautions for identified infection/condition  Outcome: Progressing  Goal: Absence of fever/infection during neutropenic period  Description: INTERVENTIONS:  - Monitor WBC    Outcome: Progressing     Problem: SAFETY ADULT  Goal: Patient will remain free of falls  Description: INTERVENTIONS:  - Educate patient/family on patient safety including physical limitations  - Instruct patient to call for assistance with activity   - Consult OT/PT to assist with strengthening/mobility   - Keep Call bell within reach  - Keep bed low and locked with side rails adjusted as appropriate  - Keep care items and personal belongings within reach  - Initiate and maintain comfort rounds  - Make Fall Risk Sign visible to staff  - Offer Toileting every 2 Hours, in advance of need  - Initiate/Maintain bed/ chair alarm  - Obtain necessary fall risk management equipment: walker  - Apply yellow socks and bracelet for high fall risk patients  - Consider moving patient to room near nurses station  Outcome: Progressing  Goal: Maintain or return to baseline ADL function  Description: INTERVENTIONS:  - Educate patient/family on patient safety including physical limitations  - Instruct patient to call for assistance with activity   - Consult OT/PT to assist with strengthening/mobility   - Keep Call bell within reach  - Keep bed low and locked with side rails adjusted as appropriate  - Keep care items and personal belongings within reach  - Initiate and maintain comfort rounds  - Make Fall Risk Sign visible to staff  - Offer Toileting every 2 Hours, in advance of need  - Initiate/Maintain bed/ chair alarm  - Obtain necessary fall risk management equipment: n/a  - Apply yellow socks and bracelet for high fall risk patients  - Consider moving patient to room near nurses station  Outcome: Progressing  Goal: Maintains/Returns to pre admission functional level  Description: INTERVENTIONS:  - Perform BMAT or MOVE assessment daily.   - Set and communicate daily mobility goal to care team and patient/family/caregiver. - Collaborate with rehabilitation services on mobility goals if consulted  - Perform Range of Motion 3 times a day. - Reposition patient every 2 hours. - Dangle patient 3 times a day  - Stand patient 3 times a day  - Ambulate patient 3 times a day  - Out of bed to chair 3 times a day   - Out of bed for meals 3 times a day  - Out of bed for toileting  - Record patient progress and toleration of activity level   Outcome: Progressing     Problem: DISCHARGE PLANNING  Goal: Discharge to home or other facility with appropriate resources  Description: INTERVENTIONS:  - Identify barriers to discharge w/patient and caregiver  - Arrange for needed discharge resources and transportation as appropriate  - Identify discharge learning needs (meds, wound care, etc.)  - Arrange for interpretive services to assist at discharge as needed  - Refer to Case Management Department for coordinating discharge planning if the patient needs post-hospital services based on physician/advanced practitioner order or complex needs related to functional status, cognitive ability, or social support system  Outcome: Progressing     Problem: Knowledge Deficit  Goal: Patient/family/caregiver demonstrates understanding of disease process, treatment plan, medications, and discharge instructions  Description: Complete learning assessment and assess knowledge base.   Interventions:  - Provide teaching at level of understanding  - Provide teaching via preferred learning methods  Outcome: Progressing     Problem: Nutrition/Hydration-ADULT  Goal: Nutrient/Hydration intake appropriate for improving, restoring or maintaining nutritional needs  Description: Monitor and assess patient's nutrition/hydration status for malnutrition. Collaborate with interdisciplinary team and initiate plan and interventions as ordered. Monitor patient's weight and dietary intake as ordered or per policy. Utilize nutrition screening tool and intervene as necessary. Determine patient's food preferences and provide high-protein, high-caloric foods as appropriate.      INTERVENTIONS:  - Monitor oral intake, urinary output, labs, and treatment plans  - Assess nutrition and hydration status and recommend course of action  - Evaluate amount of meals eaten  - Assist patient with eating if necessary   - Allow adequate time for meals  - Recommend/ encourage appropriate diets, oral nutritional supplements, and vitamin/mineral supplements  - Order, calculate, and assess calorie counts as needed  - Recommend, monitor, and adjust tube feedings and TPN/PPN based on assessed needs  - Assess need for intravenous fluids  - Provide specific nutrition/hydration education as appropriate  - Include patient/family/caregiver in decisions related to nutrition  Outcome: Progressing

## 2023-09-11 NOTE — CASE MANAGEMENT
Case Management Discharge Planning Note    Patient name Nick Barnes  Location [address] [facility] [place] 220/-96 MRN [id number]  : 1929 Date 2023       Current Admission Date: 2023  Current Admission Diagnosis:Severe sepsis [facility])   Patient Active Problem List    Diagnosis Date Noted   • Goals of care, counseling/discussion 09/10/2023   • Chronic indwelling Acosta catheter 2023   • HLD (hyperlipidemia) 2023   • HTN (hypertension) 2023   • Transaminitis 2023   • Chest wall mass 2023   • L3 vertebral fracture ([address]) 2023   • Acute metabolic encephalopathy    • Gram-negative bacteremia 2023   • Severe sepsis ([address]) 2023   • Elevated troponin 2023      LOS (days): 3  Geometric Mean LOS (GMLOS) (days): 3.50  Days to GMLOS:0.5     OBJECTIVE:  Risk of Unplanned Readmission Score: 15.5         Current admission status: Inpatient   Preferred Pharmacy:   [facility] DR MARTA PALACIOS [address] [place] [address] - [address] [address] [address] [address]  Phone: [phone] Fax: [phone]    Primary Care Provider: No primary care provider on file. Primary Insurance: MEDICARE  Secondary Insurance: [facility] [employer] OPTIONS PROGRAM    DISCHARGE DETAILS:     Pt will need wound care at discharge and would benefit from SN via [facility]. Referrals made in A[place].

## 2023-09-11 NOTE — ASSESSMENT & PLAN NOTE
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 Can consider suprapubic catheter. Initially was ordered as inpatient but wife requests to defer as outpatient at this time.   Urology in agreement and will follow up as outpt

## 2023-09-12 LAB
ANION GAP SERPL CALCULATED.3IONS-SCNC: 3 MMOL/L
BASOPHILS # BLD AUTO: 0.01 THOUSANDS/ÂΜL (ref 0–0.1)
BASOPHILS NFR BLD AUTO: 0 % (ref 0–1)
BUN SERPL-MCNC: 18 MG/DL (ref 5–25)
CALCIUM SERPL-MCNC: 8.4 MG/DL (ref 8.4–10.2)
CHLORIDE SERPL-SCNC: 108 MMOL/L (ref 96–108)
CO2 SERPL-SCNC: 24 MMOL/L (ref 21–32)
CREAT SERPL-MCNC: 0.36 MG/DL (ref 0.6–1.3)
EOSINOPHIL # BLD AUTO: 0.11 THOUSAND/ÂΜL (ref 0–0.61)
EOSINOPHIL NFR BLD AUTO: 2 % (ref 0–6)
ERYTHROCYTE [DISTWIDTH] IN BLOOD BY AUTOMATED COUNT: 14.8 % (ref 11.6–15.1)
GFR SERPL CREATININE-BSD FRML MDRD: 106 ML/MIN/1.73SQ M
GLUCOSE SERPL-MCNC: 156 MG/DL (ref 65–140)
HCT VFR BLD AUTO: 26.9 % (ref 36.5–49.3)
HGB BLD-MCNC: 8.2 G/DL (ref 12–17)
IMM GRANULOCYTES # BLD AUTO: 0.03 THOUSAND/UL (ref 0–0.2)
IMM GRANULOCYTES NFR BLD AUTO: 1 % (ref 0–2)
LYMPHOCYTES # BLD AUTO: 0.96 THOUSANDS/ÂΜL (ref 0.6–4.47)
LYMPHOCYTES NFR BLD AUTO: 17 % (ref 14–44)
MCH RBC QN AUTO: 29.1 PG (ref 26.8–34.3)
MCHC RBC AUTO-ENTMCNC: 30.5 G/DL (ref 31.4–37.4)
MCV RBC AUTO: 95 FL (ref 82–98)
MONOCYTES # BLD AUTO: 0.39 THOUSAND/ÂΜL (ref 0.17–1.22)
MONOCYTES NFR BLD AUTO: 7 % (ref 4–12)
NEUTROPHILS # BLD AUTO: 4.11 THOUSANDS/ÂΜL (ref 1.85–7.62)
NEUTS SEG NFR BLD AUTO: 73 % (ref 43–75)
NRBC BLD AUTO-RTO: 0 /100 WBCS
PLATELET # BLD AUTO: 152 THOUSANDS/UL (ref 149–390)
PMV BLD AUTO: 9.7 FL (ref 8.9–12.7)
POTASSIUM SERPL-SCNC: 3.8 MMOL/L (ref 3.5–5.3)
RBC # BLD AUTO: 2.82 MILLION/UL (ref 3.88–5.62)
SODIUM SERPL-SCNC: 135 MMOL/L (ref 135–147)
WBC # BLD AUTO: 5.61 THOUSAND/UL (ref 4.31–10.16)

## 2023-09-12 PROCEDURE — 85025 COMPLETE CBC W/AUTO DIFF WBC: CPT | Performed by: HOSPITALIST

## 2023-09-12 PROCEDURE — 99233 SBSQ HOSP IP/OBS HIGH 50: CPT | Performed by: PHYSICIAN ASSISTANT

## 2023-09-12 PROCEDURE — 80048 BASIC METABOLIC PNL TOTAL CA: CPT | Performed by: HOSPITALIST

## 2023-09-12 RX ADMIN — CEFEPIME HYDROCHLORIDE 2000 MG: 2 INJECTION, SOLUTION INTRAVENOUS at 10:21

## 2023-09-12 RX ADMIN — ASPIRIN 81 MG CHEWABLE TABLET 81 MG: 81 TABLET CHEWABLE at 08:36

## 2023-09-12 RX ADMIN — HEPARIN SODIUM 5000 UNITS: 5000 INJECTION INTRAVENOUS; SUBCUTANEOUS at 14:48

## 2023-09-12 RX ADMIN — ATORVASTATIN CALCIUM 20 MG: 20 TABLET, FILM COATED ORAL at 16:16

## 2023-09-12 RX ADMIN — FINASTERIDE 5 MG: 5 TABLET, FILM COATED ORAL at 08:36

## 2023-09-12 RX ADMIN — METOPROLOL SUCCINATE 25 MG: 25 TABLET, FILM COATED, EXTENDED RELEASE ORAL at 08:36

## 2023-09-12 RX ADMIN — HEPARIN SODIUM 5000 UNITS: 5000 INJECTION INTRAVENOUS; SUBCUTANEOUS at 23:00

## 2023-09-12 RX ADMIN — CEFEPIME HYDROCHLORIDE 2000 MG: 2 INJECTION, SOLUTION INTRAVENOUS at 02:17

## 2023-09-12 RX ADMIN — HEPARIN SODIUM 5000 UNITS: 5000 INJECTION INTRAVENOUS; SUBCUTANEOUS at 05:45

## 2023-09-12 RX ADMIN — CEFEPIME HYDROCHLORIDE 2000 MG: 2 INJECTION, SOLUTION INTRAVENOUS at 17:52

## 2023-09-12 NOTE — PROGRESS NOTES
-- Patient:  -- MRN: [id number]  -- Aidin Request ID: [id number]  -- Level of care reserved: [address]  -- Partner Reserved: [id number], [facility] [phone]  -- Clinical needs requested:  -- Geography searched: [phone]  -- Start of Service:  -- Request sent: 2:37pm EDT on 9/11/2023 by Chantal Licona  -- Partner reserved: 10:01am EDT on 9/12/2023 by Chantal Licona  -- Choice list shared: 10:01am EDT on 9/12/2023 by Chantal Licona

## 2023-09-12 NOTE — ASSESSMENT & PLAN NOTE
· Patient had fungating mass for about 3 years  · Previous admission, patient was recommended to follow-up with surgical oncology outpatient.   · Wound culture growing Pseudomonas  · Bedside biopsy completed by general surgery  · Follow-up pathology -pending  · Per ID, pseudomonas likely a colonizer and no indication to treat   · Wound care recommendations in chart

## 2023-09-12 NOTE — ASSESSMENT & PLAN NOTE
· Continues to improve, Patient with reported increased lethargy on admission  · Patient currently awake, alert and oriented x3  · CT head negative for acute intracranial abnormalities  · Suspect in setting of bacteremia  · Appears to be near baseline mentation - likely has some cognitive deficits at baseline

## 2023-09-12 NOTE — CASE MANAGEMENT
Case Management Discharge Planning Note    Patient name Brai Camarillo  [facility] [id number]  : 1929 Date 2023       Current Admission Date: 2023  Current Admission Diagnosis:Severe sepsis [facility])   Patient Active Problem List    Diagnosis Date Noted   • Goals of care, counseling/discussion 09/10/2023   • Chronic indwelling Acosta catheter 2023   • HLD (hyperlipidemia) 2023   • HTN (hypertension) 2023   • Transaminitis 2023   • Chest wall mass 2023   • L3 vertebral fracture ([address]) 2023   • Acute metabolic encephalopathy    • Gram-negative bacteremia 2023   • Severe sepsis ([address]) 2023   • Elevated troponin 2023      LOS (days): 4  Geometric Mean LOS (GMLOS) (days): 3.50  Days to GMLOS:-0.3     OBJECTIVE:  Risk of Unplanned Readmission Score: 12.53         Current admission status: Inpatient   Preferred Pharmacy:   [facility] [address]ARTA PALACIOS  [place], [place] - [address] [address] [address] [address]  Phone: [phone] Fax: [phone]    Primary Care Provider: No primary care provider on file. Primary Insurance: MEDICARE  Secondary Insurance: [facility] [employer]GRAM    DISCHARGE DETAILS:           Patient will need wound care at home and only accepting agency is [facility]. [facility] reserved in [place].                      Requested [address] [facility]         Is the patient interested in [address] at discharge?: Yes  [facility] requested[de-identified] [facility] Name[de-identified] [facility] External Referral Reason (only applicable if external HHA name selected): Services not provided in network or near patient location  [address] [facility] Provider[de-identified] PCP  Home Health Services Needed[de-identified] Wound/Ostomy Care  Homebound Criteria Met[de-identified] Uses an Assist Device (i.e. cane, walker, etc)  Supporting Clincal Findings[de-identified] Limited Endurance         Other Referral/Resources/Interventions Provided:  Interventions: [address]

## 2023-09-12 NOTE — PROGRESS NOTES
[address] [facility]  Progress Note  Name: Iain Chi  MRN: [id number]  Unit/Bed#: -01 I Date of Admission: 9/8/2023   Date of Service: 9/12/2023 I Hospital Day: 4    Assessment/Plan   Goals of care, counseling/discussion  Assessment & Plan  · Discussed with palliative care AP and wife via phone 9/11  · Patient states he defers medical decision making to his wife at this time although is somewhat confused  · Wife confirms level 1 full code at this time - please see ACP note    HTN (hypertension)  Assessment & Plan  · Antihypertensives initially held on admission due to hypotension/severe sepsis  · Blood pressure has since improved. 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 Can consider suprapubic catheter. Initially was ordered as inpatient but wife requests to defer as outpatient at this time. Urology in agreement and will follow up as outpt    Gram-negative bacteremia  Assessment & Plan  · Blood cultures x2 growing Enterobacter  · Continue IV cefepime   · Appears urine may have been collected after initiation of antibiotic.   Urine culture growing mixed contaminants  · Follow-up repeat blood cultures x2  · ID consulted and had seen pt when he was hospitalized for Enterobacter previously, it was felt likely due to GB inflammation/translocation at that time but per nika tube/gastric emptying deferred as pt improved and given his overall age and function   · LFT's and Bili wnl here, therefore ID considering possible source from colon as well   · ID recommending discharge on PO Bactrim through until 9/18   · Consider further GB eval i.e. HIDA as outpatient if pt is a candidate and perhaps a colonoscopy again if pt a candidate, will place GI referral     Acute metabolic encephalopathy  Assessment & Plan  · Continues to improve, Patient with reported increased lethargy on admission  · Patient currently awake, alert and oriented x3  · CT head negative for acute intracranial abnormalities  · Suspect in setting of bacteremia  · Appears to be near baseline mentation - likely has some cognitive deficits at baseline    Chest wall mass  Assessment & Plan  · Patient had fungating mass for about 3 years  · Previous admission, patient was recommended to follow-up with surgical oncology outpatient. · Wound culture growing Pseudomonas  · Bedside biopsy completed by general surgery  · Follow-up pathology -pending  · Per ID, pseudomonas likely a colonizer and no indication to treat   · Wound care recommendations in chart    * Severe sepsis [facility])  Assessment & Plan  · Patient met severe sepsis criteria with leukocytosis, tachypnea with lactic acidosis and initial hypotension improved s/p IVF  · In setting of Enterobacter bacteremia  · Continues on IV cefepime -chest wound culture growing Pseudomonas. Urine culture with mixed contaminants however appear this may have been obtained following initiation of antibiotics  · Repeat blood cultures pending x2  · Afebrile. Leukocytosis resolved  · Trend WBC and fever curve  · Consult ID      VTE Pharmacologic Prophylaxis: VTE Score: 5 High Risk (Score >/= 5) - Pharmacological DVT Prophylaxis Ordered: heparin. Sequential Compression Devices Ordered. Patient Centered Rounds: I performed bedside rounds with nursing staff today. Discussions with Specialists or Other Care Team Provider: ID    Education and Discussions with Family / Patient: Updated  (wife) via phone. Total Time Spent on Date of Encounter in care of patient: 45 mins.  This time was spent on one or more of the following: performing physical exam; counseling and coordination of care; obtaining or reviewing history; documenting in the medical record; reviewing/ordering tests, medications or procedures; communicating with other healthcare professionals and discussing with patient's family/caregivers. Current Length of Stay: 4 day(s)  Current Patient Status: Inpatient   Certification Statement: The patient will continue to require additional inpatient hospital stay due to pending clear bc's and stable WBCs  Discharge Plan: Anticipate discharge in 24-48 hrs to home. Code Status: Level 1 - Full Code    Subjective:   Patient has minimal complaints today although wife tells me he continues ot be fatigued. He denies any fever, chills, SOB, CP, n/v/d or abd pain. Denies any pain. All ROS negative. He tells me we are at [facility], who the president is, the year and his full name. Objective:     Vitals:   Temp (24hrs), Av.8 °F (36 °C), Min:96.8 °F (36 °C), Max:96.8 °F (36 °C)    Temp:  [96.8 °F (36 °C)] 96.8 °F (36 °C)  HR:  [64-69] 64  Resp:  [20] 20  BP: (152-167)/(63-70) 158/64  SpO2:  [97 %] 97 %  Body mass index is 21.13 kg/m². Input and Output Summary (last 24 hours): Intake/Output Summary (Last 24 hours) at 2023 1719  Last data filed at 2023 1451  Gross per 24 hour   Intake 775 ml   Output 1925 ml   Net -1150 ml       Physical Exam:   Physical Exam  Vitals and nursing note reviewed. Constitutional:       General: He is not in acute distress. Appearance: Normal appearance. He is normal weight. He is not ill-appearing or toxic-appearing. HENT:      Head: Normocephalic and atraumatic. Right Ear: External ear normal.      Left Ear: External ear normal.      Nose: Nose normal.      Mouth/Throat:      Mouth: Mucous membranes are moist.      Pharynx: Oropharynx is clear. Eyes:      Conjunctiva/sclera: Conjunctivae normal.   Cardiovascular:      Rate and Rhythm: Normal rate and regular rhythm. Pulses: Normal pulses. Heart sounds: Normal heart sounds. No murmur heard.      No gallop. Pulmonary:      Effort: Pulmonary effort is normal. No respiratory distress. Breath sounds: Normal breath sounds. No stridor. No wheezing, rhonchi or rales. Abdominal:      General: Abdomen is flat. Bowel sounds are normal. There is no distension. Palpations: Abdomen is soft. There is no mass. Tenderness: There is no abdominal tenderness. There is no guarding or rebound. Hernia: No hernia is present. Musculoskeletal:      Cervical back: Normal range of motion. Right lower leg: No edema. Left lower leg: No edema. Skin:     General: Skin is warm. Comments: L anterior chest wall mass dressed, no drainage or erythema   Neurological:      Mental Status: He is alert. Mental status is at baseline. Psychiatric:         Mood and Affect: Mood normal.         Thought Content: Thought content normal.          Additional Data:     Labs:  Results from last 7 days   Lab Units 09/12/23  0337 09/10/23  0321 09/09/23  0512   WBC Thousand/uL 5.61   < > 12.45*   HEMOGLOBIN g/dL 8.2*   < > 9.3*   HEMATOCRIT % 26.9*   < > 30.5*   PLATELETS Thousands/uL 152   < > 130*   BANDS PCT %  --   --  1   NEUTROS PCT % 73  --   --    LYMPHS PCT % 17  --   --    LYMPHO PCT %  --   --  12*   MONOS PCT % 7  --   --    MONO PCT %  --   --  0*   EOS PCT % 2  --  3    < > = values in this interval not displayed.      Results from last 7 days   Lab Units 09/12/23  0337 09/11/23  0512   SODIUM mmol/L 135 134*   POTASSIUM mmol/L 3.8 3.6   CHLORIDE mmol/L 108 108   CO2 mmol/L 24 24   BUN mg/dL 18 19   CREATININE mg/dL 0.36* 0.37*   ANION GAP mmol/L 3 2   CALCIUM mg/dL 8.4 8.3*   ALBUMIN g/dL  --  2.4*   TOTAL BILIRUBIN mg/dL  --  0.33   ALK PHOS U/L  --  54   ALT U/L  --  15   AST U/L  --  11*   GLUCOSE RANDOM mg/dL 156* 120     Results from last 7 days   Lab Units 09/08/23  1057   INR  1.15             Results from last 7 days   Lab Units 09/09/23  0512 09/08/23  1333 09/08/23  1057   LACTIC ACID mmol/L --  1.9 3.0*   PROCALCITONIN ng/ml 0.76*  --  1.03*       Lines/Drains:  Invasive Devices     Peripheral Intravenous Line  Duration           Peripheral IV 09/08/23 Right;Ventral (anterior) Forearm 4 days    Peripheral IV 09/09/23 Distal;Right;Upper;Ventral (anterior) Arm 3 days          Drain  Duration           Urethral Catheter Double-lumen 16 Fr. 4 days              Urinary Catheter:  Goal for removal: N/A - Chronic Acosta               Imaging: No pertinent imaging reviewed. Recent Cultures (last 7 days):   Results from last 7 days   Lab Units 09/11/23  0826 09/08/23  1606 09/08/23  1605 09/08/23  1237 09/08/23  1057   BLOOD CULTURE  No Growth at 24 hrs. No Growth at 24 hrs.  --   --   --  Enterobacter cloacae*  Enterobacter cloacae complex*   GRAM STAIN RESULT   --  1+ Gram negative rods*  No polys seen*  --   --  Gram negative rods*  Gram negative rods*   URINE CULTURE   --   --   --  30,000-39,000 cfu/ml  --    WOUND CULTURE   --  3+ Growth of Pseudomonas aeruginosa*  1+ Growth of  --   --   --    LEGIONELLA URINARY ANTIGEN   --   --  Negative  --   --        Last 24 Hours Medication List:   Current Facility-Administered Medications   Medication Dose Route Frequency Provider Last Rate   • aspirin  81 mg Oral Daily Marvin Cherry PA-C     • atorvastatin  20 mg Oral Daily With [address]     • cefepime  2,000 mg Intravenous Q8H Grace Arias PA-C 2,000 mg (09/12/23 1021)   • finasteride  5 mg Oral Daily Marvin Cherry PA-C     • heparin (porcine)  5,000 Units Subcutaneous [facility] Marvin Cherry PA-C     • metoprolol succinate  25 mg Oral Daily Venkat Goodman PA-C          Today, Patient Was Seen By: Coby Lucas PA-C    **Please Note: This note may have been constructed using a voice recognition system. **

## 2023-09-12 NOTE — PLAN OF CARE
Problem: Potential for Falls  Goal: Patient will remain free of falls  Description: INTERVENTIONS:  - Educate patient/family on patient safety including physical limitations  - Instruct patient to call for assistance with activity   - Consult OT/PT to assist with strengthening/mobility   - Keep Call bell within reach  - Keep bed low and locked with side rails adjusted as appropriate  - Keep care items and personal belongings within reach  - Initiate and maintain comfort rounds  - Make Fall Risk Sign visible to staff  - Offer Toileting every 2 Hours, in advance of need  - Initiate/Maintain bed/ chair alarm  - Obtain necessary fall risk management equipment: walker  - Apply yellow socks and bracelet for high fall risk patients  - Consider moving patient to room near nurses station  Outcome: Progressing     Problem: MOBILITY - ADULT  Goal: Maintain or return to baseline ADL function  Description: INTERVENTIONS:  - Educate patient/family on patient safety including physical limitations  - Instruct patient to call for assistance with activity   - Consult OT/PT to assist with strengthening/mobility   - Keep Call bell within reach  - Keep bed low and locked with side rails adjusted as appropriate  - Keep care items and personal belongings within reach  - Initiate and maintain comfort rounds  - Make Fall Risk Sign visible to staff  - Offer Toileting every 2 Hours, in advance of need  - Initiate/Maintain bed/ chair alarm  - Obtain necessary fall risk management equipment: n/a  - Apply yellow socks and bracelet for high fall risk patients  - Consider moving patient to room near nurses station  Outcome: Progressing  Goal: Maintains/Returns to pre admission functional level  Description: INTERVENTIONS:  - Perform BMAT or MOVE assessment daily.   - Set and communicate daily mobility goal to care team and patient/family/caregiver.    - Collaborate with rehabilitation services on mobility goals if consulted  - Perform Range of Motion 3 times a day. - Reposition patient every 2 hours.   - Dangle patient 3 times a day  - Stand patient 3 times a day  - Ambulate patient 3 times a day  - Out of bed to chair 3 times a day   - Out of bed for meals 3 times a day  - Out of bed for toileting  - Record patient progress and toleration of activity level   Outcome: Progressing     Problem: Prexisting or High Potential for Compromised Skin Integrity  Goal: Skin integrity is maintained or improved  Description: INTERVENTIONS:  - Identify patients at risk for skin breakdown  - Assess and monitor skin integrity  - Assess and monitor nutrition and hydration status  - Monitor labs   - Assess for incontinence   - Turn and reposition patient  - Assist with mobility/ambulation  - Relieve pressure over bony prominences  - Avoid friction and shearing  - Provide appropriate hygiene as needed including keeping skin clean and dry  - Evaluate need for skin moisturizer/barrier cream  - Collaborate with interdisciplinary team   - Patient/family teaching  - Consider wound care consult   Outcome: Progressing     Problem: PAIN - ADULT  Goal: Verbalizes/displays adequate comfort level or baseline comfort level  Description: Interventions:  - Encourage patient to monitor pain and request assistance  - Assess pain using appropriate pain scale  - Administer analgesics based on type and severity of pain and evaluate response  - Implement non-pharmacological measures as appropriate and evaluate response  - Consider cultural and social influences on pain and pain management  - Notify physician/advanced practitioner if interventions unsuccessful or patient reports new pain  Outcome: Progressing     Problem: INFECTION - ADULT  Goal: Absence or prevention of progression during hospitalization  Description: INTERVENTIONS:  - Assess and monitor for signs and symptoms of infection  - Monitor lab/diagnostic results  - Monitor all insertion sites, i.e. indwelling lines, tubes, and drains  - Monitor endotracheal if appropriate and nasal secretions for changes in amount and color  - [place] appropriate cooling/warming therapies per order  - Administer medications as ordered  - Instruct and encourage patient and family to use good hand hygiene technique  - Identify and instruct in appropriate isolation precautions for identified infection/condition  Outcome: Progressing  Goal: Absence of fever/infection during neutropenic period  Description: INTERVENTIONS:  - Monitor WBC    Outcome: Progressing     Problem: SAFETY ADULT  Goal: Patient will remain free of falls  Description: INTERVENTIONS:  - Educate patient/family on patient safety including physical limitations  - Instruct patient to call for assistance with activity   - Consult OT/PT to assist with strengthening/mobility   - Keep Call bell within reach  - Keep bed low and locked with side rails adjusted as appropriate  - Keep care items and personal belongings within reach  - Initiate and maintain comfort rounds  - Make Fall Risk Sign visible to staff  - Offer Toileting every 2 Hours, in advance of need  - Initiate/Maintain bed/ chair alarm  - Obtain necessary fall risk management equipment: walker  - Apply yellow socks and bracelet for high fall risk patients  - Consider moving patient to room near nurses station  Outcome: Progressing  Goal: Maintain or return to baseline ADL function  Description: INTERVENTIONS:  - Educate patient/family on patient safety including physical limitations  - Instruct patient to call for assistance with activity   - Consult OT/PT to assist with strengthening/mobility   - Keep Call bell within reach  - Keep bed low and locked with side rails adjusted as appropriate  - Keep care items and personal belongings within reach  - Initiate and maintain comfort rounds  - Make Fall Risk Sign visible to staff  - Offer Toileting every 2 Hours, in advance of need  - Initiate/Maintain bed/ chair alarm  - Obtain necessary fall risk management equipment: n/a  - Apply yellow socks and bracelet for high fall risk patients  - Consider moving patient to room near nurses station  Outcome: Progressing  Goal: Maintains/Returns to pre admission functional level  Description: INTERVENTIONS:  - Perform BMAT or MOVE assessment daily.   - Set and communicate daily mobility goal to care team and patient/family/caregiver. - Collaborate with rehabilitation services on mobility goals if consulted  - Perform Range of Motion 3 times a day. - Reposition patient every 2 hours. - Dangle patient 3 times a day  - Stand patient 3 times a day  - Ambulate patient 3 times a day  - Out of bed to chair 3 times a day   - Out of bed for meals 3 times a day  - Out of bed for toileting  - Record patient progress and toleration of activity level   Outcome: Progressing     Problem: DISCHARGE PLANNING  Goal: Discharge to home or other facility with appropriate resources  Description: INTERVENTIONS:  - Identify barriers to discharge w/patient and caregiver  - Arrange for needed discharge resources and transportation as appropriate  - Identify discharge learning needs (meds, wound care, etc.)  - Arrange for interpretive services to assist at discharge as needed  - Refer to Case Management Department for coordinating discharge planning if the patient needs post-hospital services based on physician/advanced practitioner order or complex needs related to functional status, cognitive ability, or social support system  Outcome: Progressing     Problem: Knowledge Deficit  Goal: Patient/family/caregiver demonstrates understanding of disease process, treatment plan, medications, and discharge instructions  Description: Complete learning assessment and assess knowledge base.   Interventions:  - Provide teaching at level of understanding  - Provide teaching via preferred learning methods  Outcome: Progressing     Problem: Nutrition/Hydration-ADULT  Goal: Nutrient/Hydration intake appropriate for improving, restoring or maintaining nutritional needs  Description: Monitor and assess patient's nutrition/hydration status for malnutrition. Collaborate with interdisciplinary team and initiate plan and interventions as ordered. Monitor patient's weight and dietary intake as ordered or per policy. Utilize nutrition screening tool and intervene as necessary. Determine patient's food preferences and provide high-protein, high-caloric foods as appropriate.      INTERVENTIONS:  - Monitor oral intake, urinary output, labs, and treatment plans  - Assess nutrition and hydration status and recommend course of action  - Evaluate amount of meals eaten  - Assist patient with eating if necessary   - Allow adequate time for meals  - Recommend/ encourage appropriate diets, oral nutritional supplements, and vitamin/mineral supplements  - Order, calculate, and assess calorie counts as needed  - Recommend, monitor, and adjust tube feedings and TPN/PPN based on assessed needs  - Assess need for intravenous fluids  - Provide specific nutrition/hydration education as appropriate  - Include patient/family/caregiver in decisions related to nutrition  Outcome: Progressing

## 2023-09-12 NOTE — CONSULTS
/Consultation - Infectious Disease   Fidencio Logan 80 y.o. male MRN: [id number]  Unit/Bed#: -01 Encounter: [id number]      Assessment/Plan     Enterobacter bacteremia    Patient is a 81 yo male with recurrent enterobacter bacteremia. He does have a chronic conde though urine cultures have not been + for this organism. Patient also with fungating mass of chest wall, though cultures + pseudomonas and mixed skin nakia, suggesting colonization. No need to treat pseudomonas most likely colonizer. CT A and P unremarkable. LFT's have been normal, though there was concern for acute cholecystitis last admission, with increased lft's. Patient with a few other superficial wounds, including on head of penis from conde, though these do not appear to be actively infected. Not clear to me where it is coming from or what may be causing it. Last admission there was a suspicion of gb disease, but nl lft's, ct, no RUQ pain. Could consider a colonoscopy to look for mass, though don't think he is a candidate given age, would consider as oupt. Patient can continue on cefepime for now. Last admission patient completed a course of cefdinir and flagyl, with inducible amp c in enterobacter, would rx with bactrim 1 ds bid until 9/18.     - continue cefepime while in house  - monitor for toxicities while on this medication    - when ready for d/c okay to transition to oral bactrim 1 ds bid until 9/18  - consider outpt evaluation of gb and gi tract  - palliative care    D/W primary team     History of Present Illness   Physician Requesting Consult: Lazaro John MD  Reason for Consult / Principal Problem: bacteremia    HPI: Fidencio Logan is a 80y.o. year old male with htn, chronic conde catheter from BPH, known fungating chest wall mass. Patient brought in from home with decreased responsiveness. Patient hypotensive with leukocytosis on admission. Patient rx with IVF, cefepime with dramatic improvement.   Urine culture was negative. Blood cultures + enterobacter which he grew in his blood in past, as well as, a pseudomonas that grew with mixed skin nakia from the fungating mass. Patient did have a CT of the Chest  which showed some LLL atelectasis. CT A and P was unremarkable. Patient denies ha, chest pains, abdominal pains, n/v/d. Last admission patient had increased lft's and there was some concern for cholecystitis as source. Inpatient consult to Infectious Diseases  Consult performed by: Daisy Dominguez MD  Consult ordered by: Marry Lugo MD          ROS: 12 systems reviewed, remainder is neg. Historical Information   Past Medical History:   Diagnosis Date   • Acute metabolic encephalopathy 1/6/0205   • Coronary artery disease    • Diabetes mellitus ([address])    • Renal disorder      Past Surgical History:   Procedure Laterality Date   • CORONARY ANGIOPLASTY WITH STENT PLACEMENT       Social History   Social History     Substance and Sexual Activity   Alcohol Use Never     Social History     Substance and Sexual Activity   Drug Use Never     Social History     Tobacco Use   Smoking Status Former   • Types: Cigarettes   Smokeless Tobacco Never     History reviewed. No pertinent family history.     Meds/Allergies   MEDS: reviewed       Current Facility-Administered Medications:   •  aspirin chewable tablet 81 mg, 81 mg, Oral, Daily, Severa Raddle, PA-C, 81 mg at 09/12/23 1639  •  atorvastatin (LIPITOR) tablet 20 mg, 20 mg, Oral, Daily With Dinner, Severa Raddle, PA-C, 20 mg at 09/11/23 1608  •  cefepime (MAXIPIME) IVPB (premix in dextrose) 2,000 mg 50 mL, 2,000 mg, Intravenous, Q8H, Brenden Kay PA-C, Last Rate: 100 mL/hr at 09/12/23 0217, 2,000 mg at 09/12/23 0217  •  finasteride (PROSCAR) tablet 5 mg, 5 mg, Oral, Daily, Severa Raddle, PA-C, 5 mg at 09/12/23 2514  •  heparin (porcine) subcutaneous injection 5,000 Units, 5,000 Units, Subcutaneous, Q8H [address], Severa Raddle, PA-C, 5,000 Units at 09/12/23 0545  •  metoprolol succinate (TOPROL-XL) 24 hr tablet 25 mg, 25 mg, Oral, Daily, Clyde Pilot Jessica PA-C, 25 mg at 09/12/23 9343    Allergies   Allergen Reactions   • Levaquin [Levofloxacin] Confusion         Intake/Output Summary (Last 24 hours) at 9/12/2023 0948  Last data filed at 9/12/2023 0731  Gross per 24 hour   Intake 475 ml   Output 1450 ml   Net -975 ml       PE:  GEN: NAD  VSS, Tmax: Tc 96.8  HEENT: anicteric  NECK: supple  CARDIAC: rrr s1s2  LUNGS: decreased  ABDOMEN: soft nt/nd + BS  EXTREMITIES: no edema  SKIN: actinic keratosis, fungating chest wall mass  NEURO: non-focal     Invasive Devices:   Peripheral IV 09/08/23 Right;Ventral (anterior) Forearm (Active)   Site Assessment Clean;Dry; Intact 09/12/23 0444   Dressing Type Transparent 09/11/23 0800   Line Status Flushed;Saline locked 09/11/23 0800   Dressing Status Clean;Dry; Intact 09/11/23 0800   Dressing Change Due 09/12/23 09/11/23 0800   Reason Not Rotated Not due 09/11/23 0800       Peripheral IV 09/09/23 Distal;Right;Upper;Ventral (anterior) Arm (Active)   Site Assessment Clean;Dry; Intact 09/12/23 0444   Dressing Type Transparent 09/11/23 0800   Line Status Flushed;Saline locked 09/11/23 0800   Dressing Status Clean;Dry; Intact 09/11/23 0800   Dressing Change Due 09/13/23 09/11/23 0800   Reason Not Rotated Not due 09/11/23 0800       Urethral Catheter Double-lumen 16 Fr. (Active)   Reasons to continue Urinary Catheter  Chronic urinary catheter 09/11/23 2004   Goal for Removal N/A- chronic conde 09/11/23 2004   Site Assessment Other (Comment) 09/09/23 0800   Conde Care Done 09/11/23 2004   Collection Container Standard drainage bag 09/11/23 2004   Securement Method Securing device (Describe) 09/11/23 2004   Output (mL) 650 mL 09/12/23 0401           Lab Results:   No results displayed because visit has over 200 results.         Imaging Studies: I have personally reviewed pertinent films in PACS  EKG, Pathology, and Other Studies: I have personally reviewed pertinent reports. Culture  Lab Results   Component Value Date    BLOODCX Received in Microbiology Lab. Culture in Progress. 09/11/2023    BLOODCX Received in Microbiology Lab. Culture in Progress. 09/11/2023    BLOODCX Enterobacter cloacae (A) 09/08/2023    BLOODCX Enterobacter cloacae complex (A) 09/08/2023    BLOODCX No Growth After 5 Days. 07/06/2023    BLOODCX No Growth After 5 Days.  07/06/2023    BLOODCX Enterobacter cloacae (A) 07/05/2023    BLOODCX Enterobacter cloacae (A) 07/05/2023     Lab Results   Component Value Date    WOUNDCULT 3+ Growth of Pseudomonas aeruginosa (A) 09/08/2023    WOUNDCULT 1+ Growth of 09/08/2023     Lab Results   Component Value Date    URINECX 30,000-39,000 cfu/ml 09/08/2023    URINECX >100,000 cfu/ml Klebsiella oxytoca (A) 07/05/2023    URINECX >100,000 cfu/ml Enterococcus faecalis (A) 07/05/2023     No results found for: "SPUTUMCULTUR"    Principal Problem:    Severe sepsis ([address])  Active Problems:    Elevated troponin    Chest wall mass    Acute metabolic encephalopathy    Gram-negative bacteremia    Chronic indwelling Acosta catheter    HLD (hyperlipidemia)    HTN (hypertension)    Goals of care, counseling/discussion

## 2023-09-12 NOTE — PLAN OF CARE
Problem: Potential for Falls  Goal: Patient will remain free of falls  Description: INTERVENTIONS:  - Educate patient/family on patient safety including physical limitations  - Instruct patient to call for assistance with activity   - Consult OT/PT to assist with strengthening/mobility   - Keep Call bell within reach  - Keep bed low and locked with side rails adjusted as appropriate  - Keep care items and personal belongings within reach  - Initiate and maintain comfort rounds  - Make Fall Risk Sign visible to staff  - Offer Toileting every 2 Hours, in advance of need  - Initiate/Maintain bed/ chair alarm  - Obtain necessary fall risk management equipment: walker  - Apply yellow socks and bracelet for high fall risk patients  - Consider moving patient to room near nurses station  Outcome: Progressing     Problem: MOBILITY - ADULT  Goal: Maintain or return to baseline ADL function  Description: INTERVENTIONS:  - Educate patient/family on patient safety including physical limitations  - Instruct patient to call for assistance with activity   - Consult OT/PT to assist with strengthening/mobility   - Keep Call bell within reach  - Keep bed low and locked with side rails adjusted as appropriate  - Keep care items and personal belongings within reach  - Initiate and maintain comfort rounds  - Make Fall Risk Sign visible to staff  - Offer Toileting every 2 Hours, in advance of need  - Initiate/Maintain bed/ chair alarm  - Obtain necessary fall risk management equipment: n/a  - Apply yellow socks and bracelet for high fall risk patients  - Consider moving patient to room near nurses station  Outcome: Progressing  Goal: Maintains/Returns to pre admission functional level  Description: INTERVENTIONS:  - Perform BMAT or MOVE assessment daily.   - Set and communicate daily mobility goal to care team and patient/family/caregiver.    - Collaborate with rehabilitation services on mobility goals if consulted  - Perform Range of Motion 4 times a day. - Reposition patient every 2 hours.   - Dangle patient 2 times a day  - Stand patient 2 times a day  - Ambulate patient 3 times a day  - Out of bed to chair 3 times a day   - Out of bed for meals 3 times a day  - Out of bed for toileting  - Record patient progress and toleration of activity level   Outcome: Progressing     Problem: Prexisting or High Potential for Compromised Skin Integrity  Goal: Skin integrity is maintained or improved  Description: INTERVENTIONS:  - Identify patients at risk for skin breakdown  - Assess and monitor skin integrity  - Assess and monitor nutrition and hydration status  - Monitor labs   - Assess for incontinence   - Turn and reposition patient  - Assist with mobility/ambulation  - Relieve pressure over bony prominences  - Avoid friction and shearing  - Provide appropriate hygiene as needed including keeping skin clean and dry  - Evaluate need for skin moisturizer/barrier cream  - Collaborate with interdisciplinary team   - Patient/family teaching  - Consider wound care consult   Outcome: Progressing     Problem: PAIN - ADULT  Goal: Verbalizes/displays adequate comfort level or baseline comfort level  Description: Interventions:  - Encourage patient to monitor pain and request assistance  - Assess pain using appropriate pain scale  - Administer analgesics based on type and severity of pain and evaluate response  - Implement non-pharmacological measures as appropriate and evaluate response  - Consider cultural and social influences on pain and pain management  - Notify physician/advanced practitioner if interventions unsuccessful or patient reports new pain  Outcome: Progressing     Problem: INFECTION - ADULT  Goal: Absence or prevention of progression during hospitalization  Description: INTERVENTIONS:  - Assess and monitor for signs and symptoms of infection  - Monitor lab/diagnostic results  - Monitor all insertion sites, i.e. indwelling lines, tubes, and drains  - Monitor endotracheal if appropriate and nasal secretions for changes in amount and color  - [place] appropriate cooling/warming therapies per order  - Administer medications as ordered  - Instruct and encourage patient and family to use good hand hygiene technique  - Identify and instruct in appropriate isolation precautions for identified infection/condition  Outcome: Progressing  Goal: Absence of fever/infection during neutropenic period  Description: INTERVENTIONS:  - Monitor WBC    Outcome: Progressing     Problem: SAFETY ADULT  Goal: Patient will remain free of falls  Description: INTERVENTIONS:  - Educate patient/family on patient safety including physical limitations  - Instruct patient to call for assistance with activity   - Consult OT/PT to assist with strengthening/mobility   - Keep Call bell within reach  - Keep bed low and locked with side rails adjusted as appropriate  - Keep care items and personal belongings within reach  - Initiate and maintain comfort rounds  - Make Fall Risk Sign visible to staff  - Offer Toileting every 2 Hours, in advance of need  - Initiate/Maintain bed/ chair alarm  - Obtain necessary fall risk management equipment: walker  - Apply yellow socks and bracelet for high fall risk patients  - Consider moving patient to room near nurses station  Outcome: Progressing  Goal: Maintain or return to baseline ADL function  Description: INTERVENTIONS:  - Educate patient/family on patient safety including physical limitations  - Instruct patient to call for assistance with activity   - Consult OT/PT to assist with strengthening/mobility   - Keep Call bell within reach  - Keep bed low and locked with side rails adjusted as appropriate  - Keep care items and personal belongings within reach  - Initiate and maintain comfort rounds  - Make Fall Risk Sign visible to staff  - Offer Toileting every 2 Hours, in advance of need  - Initiate/Maintain bed/ chair alarm  - Obtain necessary fall risk management equipment: n/a  - Apply yellow socks and bracelet for high fall risk patients  - Consider moving patient to room near nurses station  Outcome: Progressing  Goal: Maintains/Returns to pre admission functional level  Description: INTERVENTIONS:  - Perform BMAT or MOVE assessment daily.   - Set and communicate daily mobility goal to care team and patient/family/caregiver. - Collaborate with rehabilitation services on mobility goals if consulted  - Perform Range of Motion 4 times a day. - Reposition patient every 2 hours. - Dangle patient 2 times a day  - Stand patient 2 times a day  - Ambulate patient 3 times a day  - Out of bed to chair 3 times a day   - Out of bed for meals 3 times a day  - Out of bed for toileting  - Record patient progress and toleration of activity level   Outcome: Progressing     Problem: DISCHARGE PLANNING  Goal: Discharge to home or other facility with appropriate resources  Description: INTERVENTIONS:  - Identify barriers to discharge w/patient and caregiver  - Arrange for needed discharge resources and transportation as appropriate  - Identify discharge learning needs (meds, wound care, etc.)  - Arrange for interpretive services to assist at discharge as needed  - Refer to Case Management Department for coordinating discharge planning if the patient needs post-hospital services based on physician/advanced practitioner order or complex needs related to functional status, cognitive ability, or social support system  Outcome: Progressing     Problem: Knowledge Deficit  Goal: Patient/family/caregiver demonstrates understanding of disease process, treatment plan, medications, and discharge instructions  Description: Complete learning assessment and assess knowledge base.   Interventions:  - Provide teaching at level of understanding  - Provide teaching via preferred learning methods  Outcome: Progressing     Problem: Nutrition/Hydration-ADULT  Goal: Nutrient/Hydration intake appropriate for improving, restoring or maintaining nutritional needs  Description: Monitor and assess patient's nutrition/hydration status for malnutrition. Collaborate with interdisciplinary team and initiate plan and interventions as ordered. Monitor patient's weight and dietary intake as ordered or per policy. Utilize nutrition screening tool and intervene as necessary. Determine patient's food preferences and provide high-protein, high-caloric foods as appropriate.      INTERVENTIONS:  - Monitor oral intake, urinary output, labs, and treatment plans  - Assess nutrition and hydration status and recommend course of action  - Evaluate amount of meals eaten  - Assist patient with eating if necessary   - Allow adequate time for meals  - Recommend/ encourage appropriate diets, oral nutritional supplements, and vitamin/mineral supplements  - Order, calculate, and assess calorie counts as needed  - Recommend, monitor, and adjust tube feedings and TPN/PPN based on assessed needs  - Assess need for intravenous fluids  - Provide specific nutrition/hydration education as appropriate  - Include patient/family/caregiver in decisions related to nutrition  Outcome: Progressing

## 2023-09-12 NOTE — ASSESSMENT & PLAN NOTE
· Blood cultures x2 growing Enterobacter  · Continue IV cefepime   · Appears urine may have been collected after initiation of antibiotic.   Urine culture growing mixed contaminants  · Follow-up repeat blood cultures x2  · ID consulted and had seen pt when he was hospitalized for Enterobacter previously, it was felt likely due to GB inflammation/translocation at that time but per nika tube/gastric emptying deferred as pt improved and given his overall age and function   · LFT's and Bili wnl here, therefore ID considering possible source from colon as well   · ID recommending discharge on PO Bactrim through until 9/18   · Consider further GB eval i.e. HIDA as outpatient if pt is a candidate and perhaps a colonoscopy again if pt a candidate, will place GI referral

## 2023-09-13 PROBLEM — B96.89 BACTEREMIA DUE TO ENTEROBACTER SPECIES: Status: ACTIVE | Noted: 2023-07-06

## 2023-09-13 LAB
ALBUMIN SERPL BCP-MCNC: 2.5 G/DL (ref 3.5–5)
ALP SERPL-CCNC: 54 U/L (ref 34–104)
ALT SERPL W P-5'-P-CCNC: 15 U/L (ref 7–52)
ANION GAP SERPL CALCULATED.3IONS-SCNC: 3 MMOL/L
AST SERPL W P-5'-P-CCNC: 10 U/L (ref 13–39)
BASOPHILS # BLD AUTO: 0.03 THOUSANDS/ÂΜL (ref 0–0.1)
BASOPHILS NFR BLD AUTO: 1 % (ref 0–1)
BILIRUB SERPL-MCNC: 0.28 MG/DL (ref 0.2–1)
BUN SERPL-MCNC: 15 MG/DL (ref 5–25)
CALCIUM ALBUM COR SERPL-MCNC: 9.6 MG/DL (ref 8.3–10.1)
CALCIUM SERPL-MCNC: 8.4 MG/DL (ref 8.4–10.2)
CHLORIDE SERPL-SCNC: 106 MMOL/L (ref 96–108)
CO2 SERPL-SCNC: 27 MMOL/L (ref 21–32)
CREAT SERPL-MCNC: 0.35 MG/DL (ref 0.6–1.3)
EOSINOPHIL # BLD AUTO: 0.14 THOUSAND/ÂΜL (ref 0–0.61)
EOSINOPHIL NFR BLD AUTO: 2 % (ref 0–6)
ERYTHROCYTE [DISTWIDTH] IN BLOOD BY AUTOMATED COUNT: 14.7 % (ref 11.6–15.1)
GFR SERPL CREATININE-BSD FRML MDRD: 108 ML/MIN/1.73SQ M
GLUCOSE SERPL-MCNC: 132 MG/DL (ref 65–140)
HCT VFR BLD AUTO: 26.5 % (ref 36.5–49.3)
HGB BLD-MCNC: 8 G/DL (ref 12–17)
IMM GRANULOCYTES # BLD AUTO: 0.04 THOUSAND/UL (ref 0–0.2)
IMM GRANULOCYTES NFR BLD AUTO: 1 % (ref 0–2)
LYMPHOCYTES # BLD AUTO: 1.31 THOUSANDS/ÂΜL (ref 0.6–4.47)
LYMPHOCYTES NFR BLD AUTO: 21 % (ref 14–44)
MCH RBC QN AUTO: 28.5 PG (ref 26.8–34.3)
MCHC RBC AUTO-ENTMCNC: 30.2 G/DL (ref 31.4–37.4)
MCV RBC AUTO: 94 FL (ref 82–98)
MONOCYTES # BLD AUTO: 0.42 THOUSAND/ÂΜL (ref 0.17–1.22)
MONOCYTES NFR BLD AUTO: 7 % (ref 4–12)
NEUTROPHILS # BLD AUTO: 4.22 THOUSANDS/ÂΜL (ref 1.85–7.62)
NEUTS SEG NFR BLD AUTO: 68 % (ref 43–75)
NRBC BLD AUTO-RTO: 0 /100 WBCS
PLATELET # BLD AUTO: 136 THOUSANDS/UL (ref 149–390)
PMV BLD AUTO: 10.5 FL (ref 8.9–12.7)
POTASSIUM SERPL-SCNC: 3.5 MMOL/L (ref 3.5–5.3)
PROT SERPL-MCNC: 5.3 G/DL (ref 6.4–8.4)
RBC # BLD AUTO: 2.81 MILLION/UL (ref 3.88–5.62)
SODIUM SERPL-SCNC: 136 MMOL/L (ref 135–147)
WBC # BLD AUTO: 6.16 THOUSAND/UL (ref 4.31–10.16)

## 2023-09-13 PROCEDURE — 99232 SBSQ HOSP IP/OBS MODERATE 35: CPT | Performed by: STUDENT IN AN ORGANIZED HEALTH CARE EDUCATION/TRAINING PROGRAM

## 2023-09-13 PROCEDURE — 97167 OT EVAL HIGH COMPLEX 60 MIN: CPT

## 2023-09-13 PROCEDURE — 97163 PT EVAL HIGH COMPLEX 45 MIN: CPT

## 2023-09-13 PROCEDURE — 85025 COMPLETE CBC W/AUTO DIFF WBC: CPT | Performed by: HOSPITALIST

## 2023-09-13 PROCEDURE — 80053 COMPREHEN METABOLIC PANEL: CPT | Performed by: HOSPITALIST

## 2023-09-13 RX ORDER — POLYETHYLENE GLYCOL 3350 17 G/17G
17 POWDER, FOR SOLUTION ORAL DAILY
Status: DISCONTINUED | OUTPATIENT
Start: 2023-09-13 | End: 2023-09-14 | Stop reason: HOSPADM

## 2023-09-13 RX ORDER — SENNOSIDES 8.6 MG
1 TABLET ORAL 2 TIMES DAILY
Status: DISCONTINUED | OUTPATIENT
Start: 2023-09-13 | End: 2023-09-14 | Stop reason: HOSPADM

## 2023-09-13 RX ADMIN — CEFEPIME HYDROCHLORIDE 2000 MG: 2 INJECTION, SOLUTION INTRAVENOUS at 11:02

## 2023-09-13 RX ADMIN — ASPIRIN 81 MG CHEWABLE TABLET 81 MG: 81 TABLET CHEWABLE at 09:28

## 2023-09-13 RX ADMIN — CEFEPIME HYDROCHLORIDE 2000 MG: 2 INJECTION, SOLUTION INTRAVENOUS at 03:49

## 2023-09-13 RX ADMIN — HEPARIN SODIUM 5000 UNITS: 5000 INJECTION INTRAVENOUS; SUBCUTANEOUS at 21:38

## 2023-09-13 RX ADMIN — SENNOSIDES 8.6 MG: 8.6 TABLET, FILM COATED ORAL at 17:08

## 2023-09-13 RX ADMIN — HEPARIN SODIUM 5000 UNITS: 5000 INJECTION INTRAVENOUS; SUBCUTANEOUS at 05:19

## 2023-09-13 RX ADMIN — CEFEPIME HYDROCHLORIDE 2000 MG: 2 INJECTION, SOLUTION INTRAVENOUS at 17:08

## 2023-09-13 RX ADMIN — HEPARIN SODIUM 5000 UNITS: 5000 INJECTION INTRAVENOUS; SUBCUTANEOUS at 15:39

## 2023-09-13 RX ADMIN — METOPROLOL SUCCINATE 25 MG: 25 TABLET, FILM COATED, EXTENDED RELEASE ORAL at 09:28

## 2023-09-13 RX ADMIN — POLYETHYLENE GLYCOL 3350 17 G: 17 POWDER, FOR SOLUTION ORAL at 15:39

## 2023-09-13 RX ADMIN — ATORVASTATIN CALCIUM 20 MG: 20 TABLET, FILM COATED ORAL at 17:08

## 2023-09-13 RX ADMIN — FINASTERIDE 5 MG: 5 TABLET, FILM COATED ORAL at 09:28

## 2023-09-13 NOTE — ASSESSMENT & PLAN NOTE
Improved since admission. Wife reports that he continues to improve and is approaching baseline. Etiology likely due to bacteremia.    · Alert and awake  · CT head negative for acute intracranial abnormalities

## 2023-09-13 NOTE — ASSESSMENT & PLAN NOTE
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 · Consider suprapubic catheter. Initially was ordered as inpatient but wife requests to defer as outpatient at this time.  Urology in agreement and will follow up as outpt

## 2023-09-13 NOTE — PHYSICAL THERAPY NOTE
PHYSICAL THERAPY EVAL  Physical Therapy Evaluation    Performed at least 2 patient identifiers during session:  Patient Active Problem List   Diagnosis    Severe sepsis ([address])    Elevated troponin    Transaminitis    Chest wall mass    L3 vertebral fracture (HCC)    Acute metabolic encephalopathy    Bacteremia due to Enterobacter species    Chronic indwelling Acosta catheter    HLD (hyperlipidemia)    HTN (hypertension)    Goals of care, counseling/discussion       Past Medical History:   Diagnosis Date    Acute metabolic encephalopathy 2/7/4102    Coronary artery disease     Diabetes mellitus ([address])     Renal disorder        Past Surgical History:   Procedure Laterality Date    CORONARY ANGIOPLASTY WITH STENT PLACEMENT              09/13/23 1430   PT Last Visit   PT Visit Date 09/13/23   Note Type   Note type Evaluation   Pain Assessment   Pain Assessment Tool 0-10   Pain Score No Pain   Restrictions/Precautions   Other Precautions Hard of hearing; Fall Risk;Multiple lines;Cognitive   Home Living   Type of [address] [place] [address] Dr One level  (1/2 CELINE)   Bathroom Shower/Tub [address]; Wheelchair-manual   Prior Function   Level of [place] Needs assistance with ADLs; Needs assistance with functional mobility; Needs assistance with IADLS   Lives With Spouse   Receives Help From Family   IADLs Family/Friend/Other provides transportation; Family/Friend/Other provides meals; Family/Friend/Other provides medication management   Comments Uses RW for transfers, w/c for all mobility. Per chart, patient is an assist of 1 for mobility   General   Family/Caregiver Present No   Cognition   Overall Cognitive Status Impaired   Arousal/Participation Lethargic   Orientation Level Oriented to person;Oriented to place; Disoriented to time   Following Commands Follows one step commands with increased time or repetition   Subjective Subjective "I have not bee out of bed."   Bed Mobility   Supine to Sit 2  Maximal assistance   Additional items Assist x 2;HOB elevated; Bedrails; Increased time required;Verbal cues;LE management   Sit to Supine 2  Maximal assistance   Additional items Assist x 2;HOB elevated; Bedrails; Increased time required;Verbal cues;LE management   Transfers   Sit to Stand 2  Maximal assistance   Additional items Assist x 2;Armrests; Increased time required;Verbal cues   Stand to Sit 2  Maximal assistance   Additional items Assist x 2;Armrests; Increased time required;Verbal cues   Additional Comments 2 transfers performed. Posterior lean, patient standing on heels. Unable to ambulate. Balance   Static Sitting Fair +   Dynamic Sitting Fair   Static Standing Poor -   Endurance Deficit   Endurance Deficit Yes   Endurance Deficit Description Easily fatigued   Activity Tolerance   Activity Tolerance Patient limited by fatigue   Medical Staff Made Aware Nessa BECK   Nurse Made Aware RN   Assessment   Prognosis Guarded   Problem List Decreased strength;Decreased endurance; Impaired balance;Decreased mobility; Decreased cognition; Impaired hearing   Assessment Patient is a 81y/o F who presents with severe sepsis, bacteremia, acute metabolic encephalopathy, chest wall mass. Patient resides with spouse in a [address] [facility]. He primarily uses the w/c for mobility. He requires assistance for mobility and ADL's at baseline. Current medical status includes lethargy, fall risk, decreased cognition, multiple lines, decreased strength, balance, endurance and mobility. Patient required assistance of 2 for bed mobility and transfers. Very unsteady in stance with heavy posterior lean. Unable to ambulate. Patient is deconditioned and is at risk for falls. Recommending level 2 resources. The patient's AM-PAC Basic Mobility Inpatient Short Form Raw Score is 8.  A Raw score of less than or equal to 17 suggests the patient may benefit from discharge to post-acute rehabilitation services. Please also refer to the recommendation of the Physical Therapist for safe discharge planning. Barriers to Discharge Inaccessible home environment;Decreased caregiver support   Goals   Patient Goals None stated   STG Expiration Date 09/27/23   Short Term Goal #1 1. Perform supine<>sit with HOB elevated with use of bedrails mod A x 1 2. Perform sit<>stand transfers mod A x 1 3. Ambulate 3ft from bed to chair with mod A x 1   PT Treatment Day 0   Plan   Treatment/Interventions Functional transfer training;LE strengthening/ROM; Therapeutic exercise; Endurance training;Cognitive reorientation;Patient/family training;Equipment eval/education; Bed mobility;Gait training;Spoke to nursing;OT   PT Frequency 3-5x/wk   Recommendation   UB Rehab Discharge Recommendation (PT/OT) Level 2   AM-PAC Basic Mobility Inpatient   Turning in Flat Bed Without Bedrails 2   Lying on Back to Sitting on Edge of Flat Bed Without Bedrails 2   Moving Bed to Chair 1   Standing Up From Chair Using Arms 1   Walk in Room 1   Climb 3-5 Stairs With Railing 1   Basic Mobility Inpatient Raw Score 8   Turning Head Towards Sound 3   Follow Simple Instructions 3   Low Function Basic Mobility Raw Score  14   Low Function Basic Mobility Standardized Score  22.01   Highest Level Of Mobility   JH-HLM Goal 3: Sit at edge of bed   JH-HLM Achieved 3: Sit at edge of bed   End of Consult   Patient Position at End of Consult Supine; All needs within reach     Vanessa Bates, PT             Patient Name: Doni Sidhu  [facility]: 9/13/2023

## 2023-09-13 NOTE — PROGRESS NOTES
[facility]  Progress Note  Name: William Lockett  MRN: [id number]  Unit/Bed#: -01 I Date of Admission: 9/8/2023   Date of Service: 9/13/2023 I Hospital Day: 5    Assessment/Plan   * Severe sepsis [facility])  Assessment & Plan  80year old male presented with severe sepsis likely secondary to enterobacter bacteremia. · Appreciate ID recommendations. On discharge, will transition him to Bactrim 1 DS BID through 9/18/2023. · Repeat cultures NGTD  · Wife refused rehab and VNA. Deemed medically stable at this point. She cannot accept patient for home today since she requires assistance to get him out of the car, tomorrow at earliest. Wife decline transport services. Bacteremia due to Enterobacter species  Assessment & Plan  Treatment plan as written above. · ID recommending possible outpatient colonoscopy if indicated    HTN (hypertension)  Assessment & Plan  Hold on admission due to hypotension. · Reduced dose metoprolol restarted. Losartan currently on hold. 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 · Consider suprapubic catheter. Initially was ordered as inpatient but wife requests to defer as outpatient at this time. Urology in agreement and will follow up as outpt    Acute metabolic encephalopathy  Assessment & Plan  Improved since admission. Wife reports that he continues to improve and is approaching baseline. Etiology likely due to bacteremia. 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   · Wound culture growing Pseudomonas  · Bedside biopsy completed by general surgery  · Per ID, pseudomonas likely a colonizer and no indication to treat   · Wound care recommendations in chart. Offered visiting nurses and wound care. Wife declined. VTE Pharmacologic Prophylaxis:   Pharmacologic: Heparin  Mechanical VTE Prophylaxis in Place: Yes    Discussions with Specialists or Other Care Team Provider: nursing    Education and Discussions with Family / Patient: patient, wife    Current Length of Stay: 5 day(s)    Current Patient Status: Inpatient   Certification Statement: The patient will continue to require additional inpatient hospital stay due to iv antibiotics, dispo planning    Discharge Plan: 24 hours    Code Status: Level 1 - Full Code      Subjective:   Patient seen and examined at bedside. I again offered rehab placement and VNA to patient and his wife. She declined this. She does not think it will provide any benefit. I still encouraged her that we do so including wound care, but she was not interested with it. She only requested that we give him a bowel regimen to get him moving prior to discharge. Objective:     Vitals:   Temp (24hrs), Av °F (36.1 °C), Min:96.6 °F (35.9 °C), Max:97.5 °F (36.4 °C)    Temp:  [96.6 °F (35.9 °C)-97.5 °F (36.4 °C)] 96.8 °F (36 °C)  HR:  [64-72] 72  Resp:  [16-20] 16  BP: (133-168)/(59-65) 142/65  SpO2:  [97 %-98 %] 97 %  Body mass index is 22.51 kg/m². Input and Output Summary (last 24 hours): Intake/Output Summary (Last 24 hours) at 2023 1427  Last data filed at 2023 0451  Gross per 24 hour   Intake 480 ml   Output 1125 ml   Net -645 ml       Physical Exam:     Physical Exam  Vitals reviewed. Constitutional:       General: He is not in acute distress. HENT:      Head: Normocephalic. Nose: Nose normal.      Mouth/Throat:      Mouth: Mucous membranes are moist.   Eyes:      General: No scleral icterus. Cardiovascular:      Rate and Rhythm: Normal rate. Pulmonary:      Effort: Pulmonary effort is normal. No respiratory distress. Breath sounds:  No wheezing or rales. Abdominal:      Palpations: Abdomen is soft. Tenderness: There is no abdominal tenderness. Skin:     General: Skin is warm. Neurological:      Mental Status: He is alert. Mental status is at baseline. Psychiatric:         Mood and Affect: Mood normal.         Behavior: Behavior normal.       Additional Data:     Labs:    Results from last 7 days   Lab Units 09/13/23  0346 09/10/23  0321 09/09/23  0512   WBC Thousand/uL 6.16   < > 12.45*   HEMOGLOBIN g/dL 8.0*   < > 9.3*   HEMATOCRIT % 26.5*   < > 30.5*   PLATELETS Thousands/uL 136*   < > 130*   BANDS PCT %  --   --  1   NEUTROS PCT % 68   < >  --    LYMPHS PCT % 21   < >  --    LYMPHO PCT %  --   --  12*   MONOS PCT % 7   < >  --    MONO PCT %  --   --  0*   EOS PCT % 2   < > 3    < > = values in this interval not displayed. Results from last 7 days   Lab Units 09/13/23  0346   SODIUM mmol/L 136   POTASSIUM mmol/L 3.5   CHLORIDE mmol/L 106   CO2 mmol/L 27   BUN mg/dL 15   CREATININE mg/dL 0.35*   ANION GAP mmol/L 3   CALCIUM mg/dL 8.4   ALBUMIN g/dL 2.5*   TOTAL BILIRUBIN mg/dL 0.28   ALK PHOS U/L 54   ALT U/L 15   AST U/L 10*   GLUCOSE RANDOM mg/dL 132     Results from last 7 days   Lab Units 09/08/23  1057   INR  1.15             Results from last 7 days   Lab Units 09/09/23  0512 09/08/23  1333 09/08/23  1057   LACTIC ACID mmol/L  --  1.9 3.0*   PROCALCITONIN ng/ml 0.76*  --  1.03*           * I Have Reviewed All Lab Data Listed Above. * Additional Pertinent Lab Tests Reviewed: [address] Admission Reviewed      Lines:   Invasive Devices     Peripheral Intravenous Line  Duration           Peripheral IV 09/13/23 Dorsal (posterior); Left Forearm <1 day          Drain  Duration           Urethral Catheter Double-lumen 16 Fr. 5 days                   Imaging:    Imaging Reports Reviewed Today Include: no new imaging    Recent Cultures (last 7 days):     Results from last 7 days   Lab Units 09/11/23  0826 09/08/23  1606 09/08/23  1605 09/08/23  1237 09/08/23  1057   BLOOD CULTURE  No Growth at 48 hrs. No Growth at 48 hrs.  --   --   --  Enterobacter cloacae*  Enterobacter cloacae complex*   GRAM STAIN RESULT   --  1+ Gram negative rods*  No polys seen*  --   --  Gram negative rods*  Gram negative rods*   URINE CULTURE   --   --   --  30,000-39,000 cfu/ml  --    WOUND CULTURE   --  3+ Growth of Pseudomonas aeruginosa*  1+ Growth of  --   --   --    LEGIONELLA URINARY ANTIGEN   --   --  Negative  --   --        Last 24 Hours Medication List:   Current Facility-Administered Medications   Medication Dose Route Frequency Provider Last Rate   • aspirin  81 mg Oral Daily Geno Vasques PA-C     • atorvastatin  20 mg Oral Daily With [address] [place]     • cefepime  2,000 mg Intravenous Q8H Sylwia Arias PA-C 2,000 mg (09/13/23 1102)   • finasteride  5 mg Oral Daily Geno Vasques PA-C     • heparin (porcine)  5,000 Units Subcutaneous [facility] Geno Vasques PA-C     • metoprolol succinate  25 mg Oral Daily Briana Vasquez PA-C     • polyethylene glycol  17 g Oral Daily Anna Godoy MD     • senna  1 tablet Oral BID Anna Godoy MD          Today, Patient Was Seen By: Raina Cloud MD    ** Please Note: Dictation voice to text software may have been used in the creation of this document.  **

## 2023-09-13 NOTE — PLAN OF CARE
Problem: Potential for Falls  Goal: Patient will remain free of falls  Description: INTERVENTIONS:  - Educate patient/family on patient safety including physical limitations  - Instruct patient to call for assistance with activity   - Consult OT/PT to assist with strengthening/mobility   - Keep Call bell within reach  - Keep bed low and locked with side rails adjusted as appropriate  - Keep care items and personal belongings within reach  - Initiate and maintain comfort rounds  - Make Fall Risk Sign visible to staff  - Offer Toileting every 2 Hours, in advance of need  - Initiate/Maintain bed/ chair alarm  - Obtain necessary fall risk management equipment: walker  - Apply yellow socks and bracelet for high fall risk patients  - Consider moving patient to room near nurses station  Outcome: Progressing     Problem: MOBILITY - ADULT  Goal: Maintain or return to baseline ADL function  Description: INTERVENTIONS:  - Educate patient/family on patient safety including physical limitations  - Instruct patient to call for assistance with activity   - Consult OT/PT to assist with strengthening/mobility   - Keep Call bell within reach  - Keep bed low and locked with side rails adjusted as appropriate  - Keep care items and personal belongings within reach  - Initiate and maintain comfort rounds  - Make Fall Risk Sign visible to staff  - Offer Toileting every 2 Hours, in advance of need  - Initiate/Maintain bed/ chair alarm  - Obtain necessary fall risk management equipment: n/a  - Apply yellow socks and bracelet for high fall risk patients  - Consider moving patient to room near nurses station  Outcome: Progressing  Goal: Maintains/Returns to pre admission functional level  Description: INTERVENTIONS:  - Perform BMAT or MOVE assessment daily.   - Set and communicate daily mobility goal to care team and patient/family/caregiver.    - Collaborate with rehabilitation services on mobility goals if consulted  - Perform Range of Motion 2 times a day. - Reposition patient every  2 hours.   - Record patient progress and toleration of activity level   Outcome: Progressing     Problem: Prexisting or High Potential for Compromised Skin Integrity  Goal: Skin integrity is maintained or improved  Description: INTERVENTIONS:  - Identify patients at risk for skin breakdown  - Assess and monitor skin integrity  - Assess and monitor nutrition and hydration status  - Monitor labs   - Assess for incontinence   - Turn and reposition patient  - Assist with mobility/ambulation  - Relieve pressure over bony prominences  - Avoid friction and shearing  - Provide appropriate hygiene as needed including keeping skin clean and dry  - Evaluate need for skin moisturizer/barrier cream  - Collaborate with interdisciplinary team   - Patient/family teaching  - Consider wound care consult   Outcome: Progressing     Problem: PAIN - ADULT  Goal: Verbalizes/displays adequate comfort level or baseline comfort level  Description: Interventions:  - Encourage patient to monitor pain and request assistance  - Assess pain using appropriate pain scale  - Administer analgesics based on type and severity of pain and evaluate response  - Implement non-pharmacological measures as appropriate and evaluate response  - Consider cultural and social influences on pain and pain management  - Notify physician/advanced practitioner if interventions unsuccessful or patient reports new pain  Outcome: Progressing     Problem: INFECTION - ADULT  Goal: Absence or prevention of progression during hospitalization  Description: INTERVENTIONS:  - Assess and monitor for signs and symptoms of infection  - Monitor lab/diagnostic results  - Monitor all insertion sites, i.e. indwelling lines, tubes, and drains  - Monitor endotracheal if appropriate and nasal secretions for changes in amount and color  - [place] appropriate cooling/warming therapies per order  - Administer medications as ordered  - Instruct and encourage patient and family to use good hand hygiene technique  - Identify and instruct in appropriate isolation precautions for identified infection/condition  Outcome: Progressing  Goal: Absence of fever/infection during neutropenic period  Description: INTERVENTIONS:  - Monitor WBC    Outcome: Progressing     Problem: SAFETY ADULT  Goal: Patient will remain free of falls  Description: INTERVENTIONS:  - Educate patient/family on patient safety including physical limitations  - Instruct patient to call for assistance with activity   - Consult OT/PT to assist with strengthening/mobility   - Keep Call bell within reach  - Keep bed low and locked with side rails adjusted as appropriate  - Keep care items and personal belongings within reach  - Initiate and maintain comfort rounds  - Make Fall Risk Sign visible to staff  - Offer Toileting every 2 Hours, in advance of need  - Initiate/Maintain bed/ chair alarm  - Obtain necessary fall risk management equipment: walker  - Apply yellow socks and bracelet for high fall risk patients  - Consider moving patient to room near nurses station  Outcome: Progressing  Goal: Maintain or return to baseline ADL function  Description: INTERVENTIONS:  - Educate patient/family on patient safety including physical limitations  - Instruct patient to call for assistance with activity   - Consult OT/PT to assist with strengthening/mobility   - Keep Call bell within reach  - Keep bed low and locked with side rails adjusted as appropriate  - Keep care items and personal belongings within reach  - Initiate and maintain comfort rounds  - Make Fall Risk Sign visible to staff  - Offer Toileting every 2 Hours, in advance of need  - Initiate/Maintain bed/ chair alarm  - Obtain necessary fall risk management equipment: n/a  - Apply yellow socks and bracelet for high fall risk patients  - Consider moving patient to room near nurses station  Outcome: Progressing  Goal: Maintains/Returns to pre admission functional level  Description: INTERVENTIONS:  - Perform BMAT or MOVE assessment daily.   - Set and communicate daily mobility goal to care team and patient/family/caregiver. - Collaborate with rehabilitation services on mobility goals if consulted  - Perform Range of Motion 2 times a day. - Reposition patient every 2 hours. Problem: DISCHARGE PLANNING  Goal: Discharge to home or other facility with appropriate resources  Description: INTERVENTIONS:  - Identify barriers to discharge w/patient and caregiver  - Arrange for needed discharge resources and transportation as appropriate  - Identify discharge learning needs (meds, wound care, etc.)  - Arrange for interpretive services to assist at discharge as needed  - Refer to Case Management Department for coordinating discharge planning if the patient needs post-hospital services based on physician/advanced practitioner order or complex needs related to functional status, cognitive ability, or social support system  Outcome: Progressing     Problem: Knowledge Deficit  Goal: Patient/family/caregiver demonstrates understanding of disease process, treatment plan, medications, and discharge instructions  Description: Complete learning assessment and assess knowledge base. Interventions:  - Provide teaching at level of understanding  - Provide teaching via preferred learning methods  Outcome: Progressing     Problem: Nutrition/Hydration-ADULT  Goal: Nutrient/Hydration intake appropriate for improving, restoring or maintaining nutritional needs  Description: Monitor and assess patient's nutrition/hydration status for malnutrition. Collaborate with interdisciplinary team and initiate plan and interventions as ordered. Monitor patient's weight and dietary intake as ordered or per policy. Utilize nutrition screening tool and intervene as necessary. Determine patient's food preferences and provide high-protein, high-caloric foods as appropriate. INTERVENTIONS:  - Monitor oral intake, urinary output, labs, and treatment plans  - Assess nutrition and hydration status and recommend course of action  - Evaluate amount of meals eaten  - Assist patient with eating if necessary   - Allow adequate time for meals  - Recommend/ encourage appropriate diets, oral nutritional supplements, and vitamin/mineral supplements  - Order, calculate, and assess calorie counts as needed  - Recommend, monitor, and adjust tube feedings and TPN/PPN based on assessed needs  - Assess need for intravenous fluids  - Provide specific nutrition/hydration education as appropriate  - Include patient/family/caregiver in decisions related to nutrition  Outcome: Progressing

## 2023-09-13 NOTE — ASSESSMENT & PLAN NOTE
80year old male presented with severe sepsis likely secondary to enterobacter bacteremia. · Appreciate ID recommendations. On discharge, will transition him to Bactrim 1 DS BID through 9/18/2023. · Repeat cultures NGTD  · Wife refused rehab and VNA. Deemed medically stable at this point. She cannot accept patient for home today since she requires assistance to get him out of the car, tomorrow at earliest. Wife decline transport services.

## 2023-09-13 NOTE — OCCUPATIONAL THERAPY NOTE
Occupational Therapy Evaluation     Patient Name: Nate GONZALEZ Date: 9/13/2023  Problem List  Principal Problem:    Severe sepsis [facility])  Active Problems:    Elevated troponin    Chest wall mass    Acute metabolic encephalopathy    Bacteremia due to Enterobacter species    Chronic indwelling Acosta catheter    HLD (hyperlipidemia)    HTN (hypertension)    Goals of care, counseling/discussion    Past Medical History  Past Medical History:   Diagnosis Date    Acute metabolic encephalopathy 8/8/7169    Coronary artery disease     Diabetes mellitus ([address])     Renal disorder      Past Surgical History  Past Surgical History:   Procedure Laterality Date    CORONARY ANGIOPLASTY WITH STENT PLACEMENT             09/13/23 1414   OT Last Visit   OT Visit Date 09/13/23   Note Type   Note type Evaluation   Pain Assessment   Pain Assessment Tool 0-10   Pain Score No Pain   Restrictions/Precautions   Weight Bearing Precautions Per Order No   Other Precautions Cognitive; Bed Alarm; Chair Alarm; Fall Risk;Multiple lines;Hard of hearing   Home Living   Type of [address] [place] [address] One level  (1/2 CELINE)   Bathroom Shower/Tub [facility] bed;Walker; Wheelchair-manual   Additional Comments Per chart, pt mostly utilizes WC. Pt requires Ax1 for ADLs & functional txfers/mobility   Prior Function   Level of [place] Needs assistance with ADLs; Needs assistance with functional mobility; Needs assistance with IADLS   Lives With Spouse   Receives Help From Family   IADLs Family/Friend/Other provides transportation; Family/Friend/Other provides meals; Family/Friend/Other provides medication management   Comments Pt reports wife assists with all ADLs, except pt states he can feed himself.    General   Family/Caregiver Present No   Subjective   Subjective Pt easily arousable but fatigued, falls asleep quickly   ADL   Eating Assistance 3  Moderate Assistance   Grooming Assistance 2  Maximal Assistance UB Bathing Assistance 2  Maximal Assistance   LB Bathing Assistance 1  Total Assistance   UB Dressing Assistance 2  Maximal Assistance   LB Dressing Assistance 1  Total [facility] [address]  1  Total Assistance   Bed Mobility   Supine to Sit 2  Maximal assistance   Additional items Assist x 2; Increased time required;Verbal cues;LE management;HOB elevated; Bedrails   Sit to Supine 2  Maximal assistance   Additional items Assist x 2; Increased time required;Verbal cues;LE management   Transfers   Sit to Stand 2  Maximal assistance   Additional items Assist x 2; Increased time required;Verbal cues   Stand to Sit 2  Maximal assistance   Additional items Assist x 2; Increased time required;Verbal cues   Additional Comments 2 STS performed. 1st trial with RW, 2nd trial with close arm hold assist. Both trials pt retropulsive with flexed position. Pt unable to take steps. Required BL knee blocking. Activity Tolerance   Activity Tolerance Patient limited by fatigue   Medical Staff Made Aware PT [place]   Nurse Made Aware RN MAHIN Vallada[place]/Kayley DEWITT Assessment   RUE Assessment WFL   LUE Assessment   LUE Assessment WFL   Cognition   Overall Cognitive Status Impaired   Arousal/Participation Cooperative   Attention Attends with cues to redirect   Orientation Level Oriented to person;Disoriented to time;Disoriented to place; Disoriented to situation   Memory Decreased recall of precautions;Decreased recall of recent events;Decreased short term memory   Following Commands Follows one step commands with increased time or repetition   Assessment   Limitation Decreased ADL status; Decreased UE strength;Decreased Safe judgement during ADL;Decreased cognition;Decreased endurance;Decreased self-care trans;Decreased high-level ADLs   Prognosis Poor   Assessment Pt is a 80 y.o. male seen for OT evaluation at [address] [place] [address], admitted 9/8/2023 w/ Severe sepsis ([address]).   OT completed extensive review of pt's medical and social history. Comorbidities affecting pt's functional performance at time of assessment include: elevated troponin, chest wall mass, acute metabolic encephalopathy, chronic indwelling conde catheter, HLD, HTN, L3 vertebral fx. Personal factors affecting pt at time of IE include: steps to enter environment, limited home support, behavioral pattern, difficulty performing ADLS, difficulty performing IADLS , limited insight into deficits, flat affect, decreased initiation and engagement  and environment. Prior to admission, pt was living with spouse in a [address] with 1/2 CELINE. Pt was A w/  ADLS and A w/ IADLS, (-) drove, & required use of wheelchair  and RW PTA. Upon evaluation: Pt requires Max Ax2 for bed mobility, Max Ax2 for functional transfers, Max A for UB ADLs and Total A for LB ADLS 2* the following deficits impacting occupational performance: weakness, decreased strength, decreased balance, decreased tolerance, impaired initiation, impaired memory, impaired sequencing, impaired problem solving and decreased safety awareness. Full objective findings from OT assessment regarding body systems outlined above. Pt to benefit from continued skilled OT tx while in the hospital to address deficits as defined above and maximize level of functional independence w/ ADL's and functional mobility. Occupational Performance areas to address include: grooming, bathing/shower, toilet hygiene, dressing, functional mobility and clothing management. Based on findings, pt is of high complexity. The patient's raw score on the AM-PAC Daily Activity inpatient short form is 10, standardized score is 26.26, less than 39.4. Patients at this level are likely to benefit from DC to post-acute rehabilitation services. However, please refer to therapist recommendation for discharge planning given other factors that may influence destination. At this time, OT recommendations at time of discharge are DC with Level II resources.    Goals   Patient Goals Pt does not verbalize goals   Plan   Treatment Interventions ADL retraining;Functional transfer training;UE strengthening/ROM; Endurance training;Cognitive reorientation;Patient/family training;Equipment evaluation/education; Compensatory technique education;Continued evaluation   Goal Expiration Date 09/23/23   OT Treatment Day 0   OT Frequency 3-5x/wk   Recommendation   UB Rehab Discharge Recommendation (PT/OT) Level 2   AM-PAC Daily Activity Inpatient   Lower Body Dressing 1   Bathing 2   Toileting 1   Upper Body Dressing 2   Grooming 2   Eating 2   Daily Activity Raw Score 10   Turning Head Towards Sound 3   Follow Simple Instructions 2   Low Function Daily Activity Raw Score 15   Low Function Daily Activity Standardized Score  26.28   AM-PAC Applied Cognition Inpatient   Following a Speech/Presentation 2   Understanding Ordinary Conversation 3   Taking Medications 3   Remembering Where Things Are Placed or Put Away 2   Remembering List of 4-5 Errands 2   Taking Care of Complicated Tasks 1   Applied Cognition Raw Score 13   Applied Cognition Standardized Score 30.46   End of Consult   Education Provided Yes   Patient Position at End of Consult Supine;Bed/Chair alarm activated; All needs within reach   Nurse Communication Nurse aware of consult     Pt will achieve the following goals within 10 days. *Pt will complete UB bathing and dressing with Min A.    *Pt will complete LB bathing and dressing with Mod A & DME PRN. *Pt will complete toileting w/ Mod A w/ G hygiene/thoroughness using DME PRN    *Pt will complete bed mobility with Min Ax1, with HOB elevated & use of side rails PRN to prep for purposeful tasks    *Pt will perform functional transfers with on/off all surfaces with Mod Ax1 using DME as needed w/ G balance/safety.     Minor Anitra, OTR/L

## 2023-09-13 NOTE — PLAN OF CARE
Problem: PHYSICAL THERAPY ADULT  Goal: Performs mobility at highest level of function for planned discharge setting. See evaluation for individualized goals. Description: Treatment/Interventions: Functional transfer training, LE strengthening/ROM, Therapeutic exercise, Endurance training, Cognitive reorientation, Patient/family training, Equipment eval/education, Bed mobility, Gait training, Spoke to nursing, OT          See flowsheet documentation for full assessment, interventions and recommendations. Note: Prognosis: Guarded  Problem List: Decreased strength, Decreased endurance, Impaired balance, Decreased mobility, Decreased cognition, Impaired hearing  Assessment: Patient is a 79y/o F who presents with severe sepsis, bacteremia, acute metabolic encephalopathy, chest wall mass. Patient resides with spouse in a [address] [facility]. He primarily uses the w/c for mobility. He requires assistance for mobility and ADL's at baseline. Current medical status includes lethargy, fall risk, decreased cognition, multiple lines, decreased strength, balance, endurance and mobility. Patient required assistance of 2 for bed mobility and transfers. Very unsteady in stance with heavy posterior lean. Unable to ambulate. Patient is deconditioned and is at risk for falls. Recommending level 2 resources. The patient's AM-PAC Basic Mobility Inpatient Short Form Raw Score is 8. A Raw score of less than or equal to 17 suggests the patient may benefit from discharge to post-acute rehabilitation services. Please also refer to the recommendation of the Physical Therapist for safe discharge planning. Barriers to Discharge: Inaccessible home environment, Decreased caregiver support          See flowsheet documentation for full assessment.

## 2023-09-13 NOTE — CASE MANAGEMENT
Case Management Discharge Planning Note    Patient name Cristiano Duty  Location [address] [facility] 220/-94 MRN [id number]  : 1929 Date 2023       Current Admission Date: 2023  Current Admission Diagnosis:Severe sepsis [facility])   Patient Active Problem List    Diagnosis Date Noted   • Goals of care, counseling/discussion 09/10/2023   • Chronic indwelling Acosta catheter 2023   • HLD (hyperlipidemia) 2023   • HTN (hypertension) 2023   • Transaminitis 2023   • Chest wall mass 2023   • L3 vertebral fracture ([address]) 2023   • Acute metabolic encephalopathy    • Gram-negative bacteremia 2023   • Severe sepsis ([address]) 2023   • Elevated troponin 2023      LOS (days): 5  Geometric Mean LOS (GMLOS) (days): 5.00  Days to GMLOS:0     OBJECTIVE:  Risk of Unplanned Readmission Score: 12.75         Current admission status: Inpatient   Preferred Pharmacy:   [facility] [address] MARTA PALACIOS [address] [place] [address] - [address] [address] [address] [facility] [address]  Phone: [phone] Fax: [phone]    Primary Care Provider: No primary care provider on file. Primary Insurance: MEDICARE  Secondary Insurance: [facility] [employer] OPT[employer] PROGRAM    DISCHARGE DETAILS:        MAHIN left voicemail for spouse to review discharge plan and offer home health through [facility] as they were they only accepting provider available to pt. Awaiting call back.

## 2023-09-13 NOTE — ASSESSMENT & PLAN NOTE
Fungating mass for about 3 years  · Previous admission, patient was recommended to follow-up with surgical oncology outpatient. · Wound culture growing Pseudomonas  · Bedside biopsy completed by general surgery  · Per ID, pseudomonas likely a colonizer and no indication to treat   · Wound care recommendations in chart. Offered visiting nurses and wound care. Wife declined.

## 2023-09-13 NOTE — PLAN OF CARE
Problem: OCCUPATIONAL THERAPY ADULT  Goal: Performs self-care activities at highest level of function for planned discharge setting. See evaluation for individualized goals. Description: Treatment Interventions: ADL retraining, Functional transfer training, UE strengthening/ROM, Endurance training, Cognitive reorientation, Patient/family training, Equipment evaluation/education, Compensatory technique education, Continued evaluation          See flowsheet documentation for full assessment, interventions and recommendations. Note: Limitation: Decreased ADL status, Decreased UE strength, Decreased Safe judgement during ADL, Decreased cognition, Decreased endurance, Decreased self-care trans, Decreased high-level ADLs  Prognosis: Poor  Assessment: Pt is a 80 y.o. male seen for OT evaluation at [facility], admitted 9/8/2023 w/ Severe sepsis ([address]). OT completed extensive review of pt's medical and social history. Comorbidities affecting pt's functional performance at time of assessment include: elevated troponin, chest wall mass, acute metabolic encephalopathy, chronic indwelling conde catheter, HLD, HTN, L3 vertebral fx. Personal factors affecting pt at time of IE include: steps to enter environment, limited home support, behavioral pattern, difficulty performing ADLS, difficulty performing IADLS , limited insight into deficits, flat affect, decreased initiation and engagement  and environment. Prior to admission, pt was living with spouse in a [address] with 1/2 CELINE. Pt was A w/  ADLS and A w/ IADLS, (-) drove, & required use of wheelchair  and RW PTA.  Upon evaluation: Pt requires Max Ax2 for bed mobility, Max Ax2 for functional transfers, Max A for UB ADLs and Total A for LB ADLS 2* the following deficits impacting occupational performance: weakness, decreased strength, decreased balance, decreased tolerance, impaired initiation, impaired memory, impaired sequencing, impaired problem solving and decreased safety awareness. Full objective findings from OT assessment regarding body systems outlined above. Pt to benefit from continued skilled OT tx while in the hospital to address deficits as defined above and maximize level of functional independence w/ ADL's and functional mobility. Occupational Performance areas to address include: grooming, bathing/shower, toilet hygiene, dressing, functional mobility and clothing management. Based on findings, pt is of high complexity. The patient's raw score on the AM-PAC Daily Activity inpatient short form is 10, standardized score is 26.26, less than 39.4. Patients at this level are likely to benefit from DC to post-acute rehabilitation services. However, please refer to therapist recommendation for discharge planning given other factors that may influence destination. At this time, OT recommendations at time of discharge are DC with Level II resources.

## 2023-09-13 NOTE — ASSESSMENT & PLAN NOTE
Hold on admission due to hypotension. · Reduced dose metoprolol restarted. Losartan currently on hold.

## 2023-09-13 NOTE — PLAN OF CARE
Problem: Potential for Falls  Goal: Patient will remain free of falls  Description: INTERVENTIONS:  - Educate patient/family on patient safety including physical limitations  - Instruct patient to call for assistance with activity   - Consult OT/PT to assist with strengthening/mobility   - Keep Call bell within reach  - Keep bed low and locked with side rails adjusted as appropriate  - Keep care items and personal belongings within reach  - Initiate and maintain comfort rounds  - Make Fall Risk Sign visible to staff  - Offer Toileting every 2 Hours, in advance of need  - Initiate/Maintain bed/ chair alarm  - Obtain necessary fall risk management equipment: walker  - Apply yellow socks and bracelet for high fall risk patients  - Consider moving patient to room near nurses station  Outcome: Progressing     Problem: MOBILITY - ADULT  Goal: Maintain or return to baseline ADL function  Description: INTERVENTIONS:  - Educate patient/family on patient safety including physical limitations  - Instruct patient to call for assistance with activity   - Consult OT/PT to assist with strengthening/mobility   - Keep Call bell within reach  - Keep bed low and locked with side rails adjusted as appropriate  - Keep care items and personal belongings within reach  - Initiate and maintain comfort rounds  - Make Fall Risk Sign visible to staff  - Offer Toileting every 2 Hours, in advance of need  - Initiate/Maintain bed/ chair alarm  - Obtain necessary fall risk management equipment: n/a  - Apply yellow socks and bracelet for high fall risk patients  - Consider moving patient to room near nurses station  Outcome: Progressing  Goal: Maintains/Returns to pre admission functional level  Description: INTERVENTIONS:  - Perform BMAT or MOVE assessment daily.   - Set and communicate daily mobility goal to care team and patient/family/caregiver.    - Collaborate with rehabilitation services on mobility goals if consulted  - Perform Range of Motion 4 times a day. - Reposition patient every 2 hours.   - Dangle patient 2 times a day  - Stand patient 2 times a day  - Ambulate patient 3 times a day  - Out of bed to chair 3 times a day   - Out of bed for meals 3 times a day  - Out of bed for toileting  - Record patient progress and toleration of activity level   Outcome: Progressing     Problem: Prexisting or High Potential for Compromised Skin Integrity  Goal: Skin integrity is maintained or improved  Description: INTERVENTIONS:  - Identify patients at risk for skin breakdown  - Assess and monitor skin integrity  - Assess and monitor nutrition and hydration status  - Monitor labs   - Assess for incontinence   - Turn and reposition patient  - Assist with mobility/ambulation  - Relieve pressure over bony prominences  - Avoid friction and shearing  - Provide appropriate hygiene as needed including keeping skin clean and dry  - Evaluate need for skin moisturizer/barrier cream  - Collaborate with interdisciplinary team   - Patient/family teaching  - Consider wound care consult   Outcome: Progressing     Problem: PAIN - ADULT  Goal: Verbalizes/displays adequate comfort level or baseline comfort level  Description: Interventions:  - Encourage patient to monitor pain and request assistance  - Assess pain using appropriate pain scale  - Administer analgesics based on type and severity of pain and evaluate response  - Implement non-pharmacological measures as appropriate and evaluate response  - Consider cultural and social influences on pain and pain management  - Notify physician/advanced practitioner if interventions unsuccessful or patient reports new pain  Outcome: Progressing     Problem: INFECTION - ADULT  Goal: Absence or prevention of progression during hospitalization  Description: INTERVENTIONS:  - Assess and monitor for signs and symptoms of infection  - Monitor lab/diagnostic results  - Monitor all insertion sites, i.e. indwelling lines, tubes, and drains  - Monitor endotracheal if appropriate and nasal secretions for changes in amount and color  - [place] appropriate cooling/warming therapies per order  - Administer medications as ordered  - Instruct and encourage patient and family to use good hand hygiene technique  - Identify and instruct in appropriate isolation precautions for identified infection/condition  Outcome: Progressing  Goal: Absence of fever/infection during neutropenic period  Description: INTERVENTIONS:  - Monitor WBC    Outcome: Progressing     Problem: SAFETY ADULT  Goal: Patient will remain free of falls  Description: INTERVENTIONS:  - Educate patient/family on patient safety including physical limitations  - Instruct patient to call for assistance with activity   - Consult OT/PT to assist with strengthening/mobility   - Keep Call bell within reach  - Keep bed low and locked with side rails adjusted as appropriate  - Keep care items and personal belongings within reach  - Initiate and maintain comfort rounds  - Make Fall Risk Sign visible to staff  - Offer Toileting every 2 Hours, in advance of need  - Initiate/Maintain bed/ chair alarm  - Obtain necessary fall risk management equipment: walker  - Apply yellow socks and bracelet for high fall risk patients  - Consider moving patient to room near nurses station  Outcome: Progressing  Goal: Maintain or return to baseline ADL function  Description: INTERVENTIONS:  - Educate patient/family on patient safety including physical limitations  - Instruct patient to call for assistance with activity   - Consult OT/PT to assist with strengthening/mobility   - Keep Call bell within reach  - Keep bed low and locked with side rails adjusted as appropriate  - Keep care items and personal belongings within reach  - Initiate and maintain comfort rounds  - Make Fall Risk Sign visible to staff  - Offer Toileting every 2 Hours, in advance of need  - Initiate/Maintain bed/ chair alarm  - Obtain necessary fall risk management equipment: n/a  - Apply yellow socks and bracelet for high fall risk patients  - Consider moving patient to room near nurses station  Outcome: Progressing     Problem: DISCHARGE PLANNING  Goal: Discharge to home or other facility with appropriate resources  Description: INTERVENTIONS:  - Identify barriers to discharge w/patient and caregiver  - Arrange for needed discharge resources and transportation as appropriate  - Identify discharge learning needs (meds, wound care, etc.)  - Arrange for interpretive services to assist at discharge as needed  - Refer to Case Management Department for coordinating discharge planning if the patient needs post-hospital services based on physician/advanced practitioner order or complex needs related to functional status, cognitive ability, or social support system  Outcome: Progressing     Problem: Knowledge Deficit  Goal: Patient/family/caregiver demonstrates understanding of disease process, treatment plan, medications, and discharge instructions  Description: Complete learning assessment and assess knowledge base. Interventions:  - Provide teaching at level of understanding  - Provide teaching via preferred learning methods  Outcome: Progressing     Problem: Nutrition/Hydration-ADULT  Goal: Nutrient/Hydration intake appropriate for improving, restoring or maintaining nutritional needs  Description: Monitor and assess patient's nutrition/hydration status for malnutrition. Collaborate with interdisciplinary team and initiate plan and interventions as ordered. Monitor patient's weight and dietary intake as ordered or per policy. Utilize nutrition screening tool and intervene as necessary. Determine patient's food preferences and provide high-protein, high-caloric foods as appropriate.      INTERVENTIONS:  - Monitor oral intake, urinary output, labs, and treatment plans  - Assess nutrition and hydration status and recommend course of action  - Evaluate amount of meals eaten  - Assist patient with eating if necessary   - Allow adequate time for meals  - Recommend/ encourage appropriate diets, oral nutritional supplements, and vitamin/mineral supplements  - Order, calculate, and assess calorie counts as needed  - Recommend, monitor, and adjust tube feedings and TPN/PPN based on assessed needs  - Assess need for intravenous fluids  - Provide specific nutrition/hydration education as appropriate  - Include patient/family/caregiver in decisions related to nutrition  Outcome: Progressing

## 2023-09-14 VITALS
WEIGHT: 144.4 LBS | OXYGEN SATURATION: 95 % | RESPIRATION RATE: 16 BRPM | DIASTOLIC BLOOD PRESSURE: 62 MMHG | HEART RATE: 77 BPM | TEMPERATURE: 96.8 F | SYSTOLIC BLOOD PRESSURE: 134 MMHG | BODY MASS INDEX: 22.66 KG/M2 | HEIGHT: 67 IN

## 2023-09-14 PROBLEM — R65.20 SEVERE SEPSIS (HCC): Status: RESOLVED | Noted: 2023-07-05 | Resolved: 2023-09-14

## 2023-09-14 PROBLEM — A41.9 SEVERE SEPSIS (HCC): Status: RESOLVED | Noted: 2023-07-05 | Resolved: 2023-09-14

## 2023-09-14 PROBLEM — G93.41 ACUTE METABOLIC ENCEPHALOPATHY: Status: RESOLVED | Noted: 2023-07-06 | Resolved: 2023-09-14

## 2023-09-14 PROBLEM — B96.89 BACTEREMIA DUE TO ENTEROBACTER SPECIES: Status: RESOLVED | Noted: 2023-07-06 | Resolved: 2023-09-14

## 2023-09-14 PROBLEM — E78.2 MIXED HYPERLIPIDEMIA: Status: ACTIVE | Noted: 2023-09-08

## 2023-09-14 PROBLEM — R78.81 BACTEREMIA DUE TO ENTEROBACTER SPECIES: Status: RESOLVED | Noted: 2023-07-06 | Resolved: 2023-09-14

## 2023-09-14 LAB — GLUCOSE SERPL-MCNC: 133 MG/DL (ref 65–140)

## 2023-09-14 PROCEDURE — 88305 TISSUE EXAM BY PATHOLOGIST: CPT | Performed by: PATHOLOGY

## 2023-09-14 PROCEDURE — 82948 REAGENT STRIP/BLOOD GLUCOSE: CPT

## 2023-09-14 PROCEDURE — 99239 HOSP IP/OBS DSCHRG MGMT >30: CPT | Performed by: STUDENT IN AN ORGANIZED HEALTH CARE EDUCATION/TRAINING PROGRAM

## 2023-09-14 RX ORDER — SULFAMETHOXAZOLE AND TRIMETHOPRIM 800; 160 MG/1; MG/1
1 TABLET ORAL EVERY 12 HOURS SCHEDULED
Qty: 8 TABLET | Refills: 0 | Status: SHIPPED | OUTPATIENT
Start: 2023-09-14 | End: 2023-09-18

## 2023-09-14 RX ADMIN — METOPROLOL SUCCINATE 25 MG: 25 TABLET, FILM COATED, EXTENDED RELEASE ORAL at 09:58

## 2023-09-14 RX ADMIN — FINASTERIDE 5 MG: 5 TABLET, FILM COATED ORAL at 09:58

## 2023-09-14 RX ADMIN — SENNOSIDES 8.6 MG: 8.6 TABLET, FILM COATED ORAL at 09:58

## 2023-09-14 RX ADMIN — CEFEPIME HYDROCHLORIDE 2000 MG: 2 INJECTION, SOLUTION INTRAVENOUS at 04:00

## 2023-09-14 RX ADMIN — ASPIRIN 81 MG CHEWABLE TABLET 81 MG: 81 TABLET CHEWABLE at 09:58

## 2023-09-14 RX ADMIN — POLYETHYLENE GLYCOL 3350 17 G: 17 POWDER, FOR SOLUTION ORAL at 09:58

## 2023-09-14 RX ADMIN — HEPARIN SODIUM 5000 UNITS: 5000 INJECTION INTRAVENOUS; SUBCUTANEOUS at 06:33

## 2023-09-14 RX ADMIN — CEFEPIME HYDROCHLORIDE 2000 MG: 2 INJECTION, SOLUTION INTRAVENOUS at 09:58

## 2023-09-14 NOTE — DISCHARGE SUMMARY
[address] [facility]  Discharge- Ivette Aguero 11/24/1929, 80 y.o. male MRN: [id number]  Unit/Bed#: -[facility] Encounter: [id number]  Primary Care Provider: No primary care provider on file. Date and time admitted to hospital: 9/8/2023 10:35 AM    * Severe sepsis (HCC)-resolved as of 9/14/2023  Assessment & Plan  80year old male presented with severe sepsis likely secondary to enterobacter bacteremia. · Appreciate ID recommendations. On discharge, will transition him to Bactrim 1 DS BID through 9/18/2023. · Repeat cultures NGTD  · Wife refused rehab and VNA. Deemed medically stable at this point. She cannot accept patient for home today since she requires assistance to get him out of the car, tomorrow at earliest. Wife decline transport services. Goals of care, counseling/discussion  Assessment & Plan  · Discussed with palliative care AP and wife via phone 9/11  · Patient states he defers medical decision making to his wife at this time although is somewhat confused  · Wife confirms level 1 full code at this time - please see ACP note    Primary hypertension  Assessment & Plan  Hold on admission due to hypotension. · Reduced dose metoprolol restarted. Losartan currently on hold. 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 · Consider suprapubic catheter. Initially was ordered as inpatient but wife requests to defer as outpatient at this time. Urology in agreement and will follow up as outpt    Chest wall mass  Assessment & Plan  Fungating mass for about 3 years  · Previous admission, patient was recommended to follow-up with surgical oncology outpatient.   · Wound culture growing Pseudomonas  · Bedside biopsy completed by general surgery  · Per ID, pseudomonas likely a colonizer and no indication to treat   · Wound care recommendations in chart. Offered visiting nurses and wound care. Wife declined. Bacteremia due to Enterobacter species-resolved as of 9/14/2023  Assessment & Plan  Treatment plan as written above. · ID recommending possible outpatient colonoscopy if indicated    Acute metabolic encephalopathy-resolved as of 9/14/2023  Assessment & Plan  Improved since admission. Wife reports that he continues to improve and is approaching baseline. Etiology likely due to bacteremia. · Alert and awake  · CT head negative for acute intracranial abnormalities    Medical Problems     Resolved Problems  Date Reviewed: 9/13/2023          Resolved    * (Principal) Severe sepsis ([address]) 9/14/2023     Resolved by  Jann Salgado MD    Hyponatremia 9/10/2023     Resolved by  Anurag Etienne MD    Acute metabolic encephalopathy 9/85/2070     Resolved by  Jann Salgado MD    Bacteremia due to Enterobacter species 9/14/2023     Resolved by  Jann Salgado MD    CHICO (acute kidney injury) ([address]) 9/10/2023     Resolved by  Anurag Etienne MD        Discharging Physician / Practitioner: Jann Salgado MD  PCP: No primary care provider on file. Admission Date:   Admission Orders (From admission, onward)     Ordered        09/08/23 1359  [address]  Once                      Discharge Date: 09/14/23    Consultations During Hospital Stay:  · Urology  · Surgical oncology  · Palliative care  · ID  · IR  · Case management  · PT  · OT  · Wound care  · Nutrition  ·     Procedures Performed:   CT chest abdomen pelvis w contrast   Final Result   No significant interval change. Consolidation in the inferior left lower lobe, probably atelectatic but correlate to exclude symptoms of pneumonia. Trace bilateral pleural fluid   No acute intra-abdominal pathology identified. Cholelithiasis. No sonographic cholecystitis. Other chronic findings, as per the body of the report.                Workstation performed: [id number]         CT head without contrast   Final Result      No acute intracranial abnormality. Involutional changes. Workstation performed: [id number]         XR chest portable   Final Result      No acute cardiopulmonary disease. Trace left effusion with left basilar atelectasis/scarring, similar to the CT of July 5, 2023. Resident: Evelina Koenig, the attending radiologist, have reviewed the images and agree with the final report above. Workstation performed: [id number]         ·   · I&D on 9/9/23 of left chest wall mass    Significant Findings / Test Results:   · See above    Incidental Findings:   · See above   · I reviewed the above mentioned incidental findings with the patient and/or family and they expressed understanding. Test Results Pending at Discharge (will require follow up):   · none     Outpatient Tests Requested:  · none    Complications:  None known at this time    Reason for Admission: Severe sepsis secondary to Enterobacter bacteremia    Hospital Course:   Bria Camarillo is a 80 y.o. male patient who originally presented to the hospital on 9/8/2023 due to being found unresponsive by his wife with increased lethargy and difficult to arouse. In the ED patient met severe sepsis criteria IV fluids were given blood cultures were drawn and patient was started on IV antibiotics. Also on admission patient was found to be hyponatremic with an CHICO and work-up was done. Patient was admitted to the ICU. I&D was performed for chest wall mass the following day. Blood cultures grew Enterobacter and wound culture was growing Pseudomonas. ID was consulted. He was continued on cefepime during his stay. PT OT evaluated the patient and recommended rehab however wife declined rehab and home health services. .  Palliative care was consulted for goals of care discussions. Patient will continue to be a level 1.   IR was initially consulted for placement of suprapubic cath however wife declined procedure and urology felt that it was okay to be done outpatient as well. Mentation gradually started to improve after being downgraded. He otherwise remained hemodynamically stable afebrile in no acute respiratory distress. He has been medically cleared for discharge. He is to follow-up with his PCP. He is to complete Bactrim till 9 8/18/2023      Please see above list of diagnoses and related plan for additional information. Condition at Discharge: stable    Discharge Day Visit / Exam:   Subjective:  Kelton Olmos was seen and examined at bedside. No acute events overnight. Discussed plan of care. All questions and concerns were answered and addressed. Has no acute complaints at this time is a poor historian. Vitals: Blood Pressure: 134/62 (09/14/23 0731)  Pulse: 77 (09/14/23 0731)  Temperature: (!) 96.8 °F (36 °C) (09/13/23 2045)  Temp Source: Temporal (09/11/23 2103)  Respirations: 16 (09/13/23 1416)  Height: 5' 7" (170.2 cm) (09/08/23 1400)  Weight - Scale: 65.5 kg (144 lb 6.4 oz) (09/14/23 0555)  SpO2: 95 % (09/14/23 0731)  Exam:   Physical Exam  Vitals and nursing note reviewed. Constitutional:       General: He is not in acute distress. Appearance: He is ill-appearing (chronically). HENT:      Head: Normocephalic and atraumatic. Cardiovascular:      Rate and Rhythm: Normal rate and regular rhythm. Pulses: Normal pulses. Heart sounds: Normal heart sounds. Pulmonary:      Effort: Pulmonary effort is normal.      Breath sounds: Normal breath sounds. Abdominal:      General: Abdomen is flat. Bowel sounds are normal.      Palpations: Abdomen is soft. Musculoskeletal:      Right lower leg: No edema. Left lower leg: No edema. Skin:     General: Skin is warm. Neurological:      General: No focal deficit present. Mental Status: He is alert. Mental status is at baseline.           Discussion with Family: Updated  (wife) at bedside. Discharge instructions/Information to patient and family:   See after visit summary for information provided to patient and family. Provisions for Follow-Up Care:  See after visit summary for information related to follow-up care and any pertinent home health orders. Disposition:   Home    Planned Readmission: no     Discharge Statement:  I spent 35 minutes discharging the patient. This time was spent on the day of discharge. I had direct contact with the patient on the day of discharge. Greater than 50% of the total time was spent examining patient, answering all patient questions, arranging and discussing plan of care with patient as well as directly providing post-discharge instructions. Additional time then spent on discharge activities. Discharge Medications:  See after visit summary for reconciled discharge medications provided to patient and/or family.       **Please Note: This note may have been constructed using a voice recognition system**

## 2023-09-14 NOTE — PLAN OF CARE
Problem: Potential for Falls  Goal: Patient will remain free of falls  Description: INTERVENTIONS:  - Educate patient/family on patient safety including physical limitations  - Instruct patient to call for assistance with activity   - Consult OT/PT to assist with strengthening/mobility   - Keep Call bell within reach  - Keep bed low and locked with side rails adjusted as appropriate  - Keep care items and personal belongings within reach  - Initiate and maintain comfort rounds  - Make Fall Risk Sign visible to staff  - Offer Toileting every 2 Hours, in advance of need  - Initiate/Maintain bed/ chair alarm  - Obtain necessary fall risk management equipment: walker  - Apply yellow socks and bracelet for high fall risk patients  - Consider moving patient to room near nurses station  Outcome: Progressing     Problem: PAIN - ADULT  Goal: Verbalizes/displays adequate comfort level or baseline comfort level  Description: Interventions:  - Encourage patient to monitor pain and request assistance  - Assess pain using appropriate pain scale  - Administer analgesics based on type and severity of pain and evaluate response  - Implement non-pharmacological measures as appropriate and evaluate response  - Consider cultural and social influences on pain and pain management  - Notify physician/advanced practitioner if interventions unsuccessful or patient reports new pain  Outcome: Progressing     Problem: Prexisting or High Potential for Compromised Skin Integrity  Goal: Skin integrity is maintained or improved  Description: INTERVENTIONS:  - Identify patients at risk for skin breakdown  - Assess and monitor skin integrity  - Assess and monitor nutrition and hydration status  - Monitor labs   - Assess for incontinence   - Turn and reposition patient  - Assist with mobility/ambulation  - Relieve pressure over bony prominences  - Avoid friction and shearing  - Provide appropriate hygiene as needed including keeping skin clean and dry  - Evaluate need for skin moisturizer/barrier cream  - Collaborate with interdisciplinary team   - Patient/family teaching  - Consider wound care consult   Outcome: Progressing

## 2023-09-14 NOTE — PLAN OF CARE
Problem: Potential for Falls  Goal: Patient will remain free of falls  Description: INTERVENTIONS:  - Educate patient/family on patient safety including physical limitations  - Instruct patient to call for assistance with activity   - Consult OT/PT to assist with strengthening/mobility   - Keep Call bell within reach  - Keep bed low and locked with side rails adjusted as appropriate  - Keep care items and personal belongings within reach  - Initiate and maintain comfort rounds  - Make Fall Risk Sign visible to staff  - Offer Toileting every 2 Hours, in advance of need  - Initiate/Maintain bed/ chair alarm  - Obtain necessary fall risk management equipment: walker  - Apply yellow socks and bracelet for high fall risk patients  - Consider moving patient to room near nurses station  Outcome: Progressing     Problem: MOBILITY - ADULT  Goal: Maintain or return to baseline ADL function  Description: INTERVENTIONS:  - Educate patient/family on patient safety including physical limitations  - Instruct patient to call for assistance with activity   - Consult OT/PT to assist with strengthening/mobility   - Keep Call bell within reach  - Keep bed low and locked with side rails adjusted as appropriate  - Keep care items and personal belongings within reach  - Initiate and maintain comfort rounds  - Make Fall Risk Sign visible to staff  - Offer Toileting every 2 Hours, in advance of need  - Initiate/Maintain bed/ chair alarm  - Obtain necessary fall risk management equipment: n/a  - Apply yellow socks and bracelet for high fall risk patients  - Consider moving patient to room near nurses station  Outcome: Progressing  Goal: Maintains/Returns to pre admission functional level  Description: INTERVENTIONS:  - Perform BMAT or MOVE assessment daily.   - Set and communicate daily mobility goal to care team and patient/family/caregiver.    - Collaborate with rehabilitation services on mobility goals if consulted  - Perform Range of Motion 2 times a day. - Reposition patient every 2 hours.   - Dangle patient 2 times a day  - Stand patient 2 times a day  - Ambulate patient 2 times a day  - Out of bed to chair 2 times a day   - Out of bed for meals 2 times a day  - Out of bed for toileting  - Record patient progress and toleration of activity level   Outcome: Progressing     Problem: Prexisting or High Potential for Compromised Skin Integrity  Goal: Skin integrity is maintained or improved  Description: INTERVENTIONS:  - Identify patients at risk for skin breakdown  - Assess and monitor skin integrity  - Assess and monitor nutrition and hydration status  - Monitor labs   - Assess for incontinence   - Turn and reposition patient  - Assist with mobility/ambulation  - Relieve pressure over bony prominences  - Avoid friction and shearing  - Provide appropriate hygiene as needed including keeping skin clean and dry  - Evaluate need for skin moisturizer/barrier cream  - Collaborate with interdisciplinary team   - Patient/family teaching  - Consider wound care consult   Outcome: Progressing     Problem: PAIN - ADULT  Goal: Verbalizes/displays adequate comfort level or baseline comfort level  Description: Interventions:  - Encourage patient to monitor pain and request assistance  - Assess pain using appropriate pain scale  - Administer analgesics based on type and severity of pain and evaluate response  - Implement non-pharmacological measures as appropriate and evaluate response  - Consider cultural and social influences on pain and pain management  - Notify physician/advanced practitioner if interventions unsuccessful or patient reports new pain  Outcome: Progressing     Problem: INFECTION - ADULT  Goal: Absence or prevention of progression during hospitalization  Description: INTERVENTIONS:  - Assess and monitor for signs and symptoms of infection  - Monitor lab/diagnostic results  - Monitor all insertion sites, i.e. indwelling lines, tubes, and drains  - Monitor endotracheal if appropriate and nasal secretions for changes in amount and color  - [place] appropriate cooling/warming therapies per order  - Administer medications as ordered  - Instruct and encourage patient and family to use good hand hygiene technique  - Identify and instruct in appropriate isolation precautions for identified infection/condition  Outcome: Progressing  Goal: Absence of fever/infection during neutropenic period  Description: INTERVENTIONS:  - Monitor WBC    Outcome: Progressing     Problem: SAFETY ADULT  Goal: Patient will remain free of falls  Description: INTERVENTIONS:  - Educate patient/family on patient safety including physical limitations  - Instruct patient to call for assistance with activity   - Consult OT/PT to assist with strengthening/mobility   - Keep Call bell within reach  - Keep bed low and locked with side rails adjusted as appropriate  - Keep care items and personal belongings within reach  - Initiate and maintain comfort rounds  - Make Fall Risk Sign visible to staff  - Offer Toileting every 2 Hours, in advance of need  - Initiate/Maintain bed/ chair alarm  - Obtain necessary fall risk management equipment: walker  - Apply yellow socks and bracelet for high fall risk patients  - Consider moving patient to room near nurses station  Outcome: Progressing  Goal: Maintain or return to baseline ADL function  Description: INTERVENTIONS:  - Educate patient/family on patient safety including physical limitations  - Instruct patient to call for assistance with activity   - Consult OT/PT to assist with strengthening/mobility   - Keep Call bell within reach  - Keep bed low and locked with side rails adjusted as appropriate  - Keep care items and personal belongings within reach  - Initiate and maintain comfort rounds  - Make Fall Risk Sign visible to staff  - Offer Toileting every 2 Hours, in advance of need  - Initiate/Maintain bed/ chair alarm  - Obtain necessary fall risk management equipment: n/a  - Apply yellow socks and bracelet for high fall risk patients  - Consider moving patient to room near nurses station  Outcome: Progressing  Goal: Maintains/Returns to pre admission functional level  Description: INTERVENTIONS:  - Perform BMAT or MOVE assessment daily.   - Set and communicate daily mobility goal to care team and patient/family/caregiver. - Collaborate with rehabilitation services on mobility goals if consulted  - Perform Range of Motion 2 times a day. - Reposition patient every 2 hours. - Dangle patient 2 times a day  - Stand patient 2 times a day  - Ambulate patient 2 times a day  - Out of bed to chair 2 times a day   - Out of bed for meals 2 times a day  - Out of bed for toileting  - Record patient progress and toleration of activity level   Outcome: Progressing     Problem: DISCHARGE PLANNING  Goal: Discharge to home or other facility with appropriate resources  Description: INTERVENTIONS:  - Identify barriers to discharge w/patient and caregiver  - Arrange for needed discharge resources and transportation as appropriate  - Identify discharge learning needs (meds, wound care, etc.)  - Arrange for interpretive services to assist at discharge as needed  - Refer to Case Management Department for coordinating discharge planning if the patient needs post-hospital services based on physician/advanced practitioner order or complex needs related to functional status, cognitive ability, or social support system  Outcome: Progressing     Problem: Knowledge Deficit  Goal: Patient/family/caregiver demonstrates understanding of disease process, treatment plan, medications, and discharge instructions  Description: Complete learning assessment and assess knowledge base.   Interventions:  - Provide teaching at level of understanding  - Provide teaching via preferred learning methods  Outcome: Progressing     Problem: Nutrition/Hydration-ADULT  Goal: Nutrient/Hydration intake appropriate for improving, restoring or maintaining nutritional needs  Description: Monitor and assess patient's nutrition/hydration status for malnutrition. Collaborate with interdisciplinary team and initiate plan and interventions as ordered. Monitor patient's weight and dietary intake as ordered or per policy. Utilize nutrition screening tool and intervene as necessary. Determine patient's food preferences and provide high-protein, high-caloric foods as appropriate.      INTERVENTIONS:  - Monitor oral intake, urinary output, labs, and treatment plans  - Assess nutrition and hydration status and recommend course of action  - Evaluate amount of meals eaten  - Assist patient with eating if necessary   - Allow adequate time for meals  - Recommend/ encourage appropriate diets, oral nutritional supplements, and vitamin/mineral supplements  - Order, calculate, and assess calorie counts as needed  - Recommend, monitor, and adjust tube feedings and TPN/PPN based on assessed needs  - Assess need for intravenous fluids  - Provide specific nutrition/hydration education as appropriate  - Include patient/family/caregiver in decisions related to nutrition  Outcome: Progressing

## 2023-09-14 NOTE — DISCHARGE INSTR - AVS FIRST PAGE
You are to follow-up with your PCP next week    You are to follow-up with wound care, urology and surgical oncology    You are to continue Bactrim that you are to take twice a day until 9/18/2023

## 2023-09-14 NOTE — CASE MANAGEMENT
Case Management Discharge Planning Note    Patient name Fidencio Logan  Location [address] [facility] [place] 220/-88 MRN [id number]  : 1929 Date 2023       Current Admission Date: 2023  Current Admission Diagnosis:Severe sepsis [facility])   Patient Active Problem List    Diagnosis Date Noted   • Goals of care, counseling/discussion 09/10/2023   • Chronic indwelling Acosta catheter 2023   • Mixed hyperlipidemia 2023   • Primary hypertension 2023   • Transaminitis 2023   • Chest wall mass 2023   • L3 vertebral fracture ([address]) 2023   • Acute metabolic encephalopathy    • Bacteremia due to Enterobacter species 2023   • Severe sepsis ([address]) 2023   • Elevated troponin 2023      LOS (days): 6  Geometric Mean LOS (GMLOS) (days): 5.00  Days to GMLOS:-0.9     OBJECTIVE:  Risk of Unplanned Readmission Score: 13.23         Current admission status: Inpatient   Preferred Pharmacy:   [address] [place] [address] - [address] [facility] [address] [place] [address]  Phone: [phone] Fax: [phone]    Primary Care Provider: No primary care provider on file. Primary Insurance: MEDICARE  Secondary Insurance: [facility] HE[employer] OPTIONS PROGRAM    DISCHARGE DETAILS:     Met with pt and spouse in . Spouse states she does not want pt to go to rehab nor does she want home health. CM offered to arrange transport and and she declined that as well. MAHIN sent message to MD via TT to provide update to pt's wife and discharge pt.

## 2023-09-14 NOTE — CASE MANAGEMENT
Case Management Discharge Planning Note    Patient name Joel Reddy  Location [address] [facility] [place] 220/-16 MRN [id number]  : 1929 Date 2023       Current Admission Date: 2023  Current Admission Diagnosis:Chronic indwelling Acosta catheter   Patient Active Problem List    Diagnosis Date Noted   • Goals of care, counseling/discussion 09/10/2023   • Chronic indwelling Acosta catheter 2023   • Mixed hyperlipidemia 2023   • Primary hypertension 2023   • Transaminitis 2023   • Chest wall mass 2023   • L3 vertebral fracture ([address]) 2023   • Elevated troponin 2023      LOS (days): 6  Geometric Mean LOS (GMLOS) (days): 5.00  Days to GMLOS:-1     OBJECTIVE:  Risk of Unplanned Readmission Score: 13.23         Current admission status: Inpatient   Preferred Pharmacy:   [facility] [address]ARTA PALACIOS [address] [place] [address] - [address] [place] [address]  Phone: [phone] Fax: [phone]    Primary Care Provider: No primary care provider on file. Primary Insurance: MEDICARE  Secondary Insurance: [facility] HEALTH OPTIONS PROGRAM    DISCHARGE DETAILS:       IMM Given (Date):: 23 (1256p)  IMM Given to[de-identified] Family  Family notified[de-identified] Spouse, Daniella Davey         IMM reviewed with patient's caregiver, patient's caregiver agrees with discharge determination.

## 2023-09-14 NOTE — CASE MANAGEMENT
Case Management Discharge Planning Note    Patient name Fidencio Logan  Location [address] [facility] [place] 220/-92 MRN [id number]  : 1929 Date 2023       Current Admission Date: 2023  Current Admission Diagnosis:Severe sepsis [facility])   Patient Active Problem List    Diagnosis Date Noted   • Goals of care, counseling/discussion 09/10/2023   • Chronic indwelling Acosta catheter 2023   • Mixed hyperlipidemia 2023   • Primary hypertension 2023   • Transaminitis 2023   • Chest wall mass 2023   • L3 vertebral fracture ([address]) 2023   • Acute metabolic encephalopathy    • Bacteremia due to Enterobacter species 2023   • Severe sepsis ([address]) 2023   • Elevated troponin 2023      LOS (days): 6  Geometric Mean LOS (GMLOS) (days): 5.00  Days to GMLOS:-0.8     OBJECTIVE:  Risk of Unplanned Readmission Score: 13.2         Current admission status: Inpatient   Preferred Pharmacy:   [facility] [address] [id number] [address] - [facility] [address]   Phone: [phone] Fax: [phone]    Primary Care Provider: No primary care provider on file. Primary Insurance: MEDICARE  Secondary Insurance: [facility] HEALTH OPTIONS PROGRAM    DISCHARGE DETAILS:     CM left another message for spouse to discuss discharge plan for patient as he is medically stable per MD. CM spoke with pt and he states spouse should be coming in to the hospital momentarily.

## 2023-09-15 ENCOUNTER — TELEPHONE (OUTPATIENT)
Dept: HEMATOLOGY ONCOLOGY | Facility: CLINIC | Age: 88
End: 2023-09-15

## 2023-09-15 NOTE — TELEPHONE ENCOUNTER
I called Maisha Wright in response to a referral that was received for patient to establish care with Surgical Oncology. Outreach was made to schedule a consultation. I left a voicemail explaining the reason for my call and advised patient to call [facility] at [phone]. Another attempt will be made to contact patient.

## 2023-09-16 LAB
BACTERIA BLD CULT: NORMAL
BACTERIA BLD CULT: NORMAL

## 2023-09-22 ENCOUNTER — TELEPHONE (OUTPATIENT)
Dept: HEMATOLOGY ONCOLOGY | Facility: CLINIC | Age: 88
End: 2023-09-22

## 2023-09-22 NOTE — TELEPHONE ENCOUNTER
I called Joceline Olivas in response to a referral that was received for patient to establish care with Surgical Oncology.      Outreach was made to schedule a consultation.     I left a voicemail explaining the reason for my call and advised patient to call [facility] at [phone]. Another attempt will be made to contact patient.

## 2023-09-25 ENCOUNTER — TELEPHONE (OUTPATIENT)
Dept: HEMATOLOGY ONCOLOGY | Facility: CLINIC | Age: 88
End: 2023-09-25

## 2023-10-17 PROBLEM — R00.1 SYMPTOMATIC BRADYCARDIA: Status: ACTIVE | Noted: 2023-01-01

## 2023-10-17 PROBLEM — R55 SYNCOPE, CARDIOGENIC: Status: ACTIVE | Noted: 2023-01-01

## 2023-10-17 NOTE — ED PROVIDER NOTES
History  Chief Complaint   Patient presents with    Syncope     Per pt's spouse, pt has had several syncopal episodes this AM. Denies any fall. Pt has either been in chair or laying down when this happens. 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 He is bedridden. He has no complaints but appears lethargic        Prior to Admission Medications   Prescriptions Last Dose Informant Patient Reported? Taking?   aspirin 81 mg chewable tablet   Yes No   Sig: Chew 81 mg daily   atorvastatin (LIPITOR) 20 mg tablet   Yes No   Sig: Take 20 mg by mouth daily   finasteride (PROSCAR) 5 mg tablet   Yes No   Sig: Take 5 mg by mouth daily   losartan (COZAAR) 50 mg tablet   Yes No   Sig: Take 25 mg by mouth daily   metoprolol succinate (TOPROL-XL) 50 mg 24 hr tablet   Yes No   Sig: Take 50 mg by mouth daily   tamsulosin (FLOMAX) 0.4 mg   No No   Sig: Take 1 capsule (0.4 mg total) by mouth daily with dinner      Facility-Administered Medications: None       Past Medical History:   Diagnosis Date    Acute metabolic encephalopathy 7/7/2175    Coronary artery disease     Diabetes mellitus ([address])     Renal disorder        Past Surgical History:   Procedure Laterality Date    CORONARY ANGIOPLASTY WITH STENT PLACEMENT         History reviewed. No pertinent family history. I have reviewed and agree with the history as documented. E-Cigarette/Vaping    E-Cigarette Use Never User      E-Cigarette/Vaping Substances    Nicotine No     Flavoring No      Social History     Tobacco Use    Smoking status: Former     Types: Cigarettes    Smokeless tobacco: Never   Vaping Use    Vaping Use: Never used   Substance Use Topics    Alcohol use: Never    Drug use: Never       Review of Systems   Unable to perform ROS: Acuity of condition   All other systems reviewed and are negative.       Physical Exam  Physical Exam  Vitals and nursing note reviewed. Constitutional:       Appearance: He is cachectic. He is ill-appearing. HENT:      Head: Normocephalic and atraumatic. Mouth/Throat:      Mouth: Mucous membranes are dry. Eyes:      Conjunctiva/sclera: Conjunctivae normal.   Cardiovascular:      Rate and Rhythm: Bradycardia present. Rhythm irregular. Heart sounds: No murmur heard. Pulmonary:      Effort: Pulmonary effort is normal. No respiratory distress. Breath sounds: Normal breath sounds. Comments: Large purulent chest wall mass on left  Abdominal:      Palpations: Abdomen is soft. Tenderness: There is no abdominal tenderness. Musculoskeletal:         General: No swelling. Cervical back: Neck supple. Skin:     General: Skin is warm and dry. Capillary Refill: Capillary refill takes less than 2 seconds. Neurological:      Mental Status: He is lethargic. GCS: GCS eye subscore is 3. GCS verbal subscore is 4. GCS motor subscore is 6. Cranial Nerves: No facial asymmetry. Sensory: Sensation is intact.       Comments: Generalized weakness, patient cannot sit up or lift legs up on his own   Psychiatric:         Mood and Affect: Mood normal.         Vital Signs  ED Triage Vitals [10/17/23 1126]   Temperature Pulse Respirations Blood Pressure SpO2   97.9 °F (36.6 °C) (S) (!) 29 15 165/65 93 %      Temp Source Heart Rate Source Patient Position - Orthostatic VS BP Location FiO2 (%)   Temporal Monitor Sitting Left arm --      Pain Score       No Pain           Vitals:    10/17/23 1200 10/17/23 1230 10/17/23 1300 10/17/23 1600   BP: 132/63 162/70 161/72 (!) 197/79   Pulse: (!) 32 (!) 28 (!) 27 (!) 29   Patient Position - Orthostatic VS:             Visual Acuity      ED Medications  Medications - No data to display    Diagnostic Studies  Results Reviewed       Procedure Component Value Units Date/Time    HS Troponin 0hr (reflex protocol) [[id number]]  (Normal) Collected: 10/17/23 1138    Lab Status: Final result Specimen: Blood from Arm, Left Updated: 10/17/23 1209     hs TnI 0hr 22 ng/L     Comprehensive metabolic panel [[id number]]  (Abnormal) Collected: 10/17/23 1138    Lab Status: Final result Specimen: Blood from Arm, Left Updated: 10/17/23 1159     Sodium 131 mmol/L      Potassium 3.7 mmol/L      Chloride 98 mmol/L      CO2 24 mmol/L      ANION GAP 9 mmol/L      BUN 40 mg/dL      Creatinine 0.65 mg/dL      Glucose 179 mg/dL      Calcium 9.1 mg/dL      Corrected Calcium 9.7 mg/dL      AST 11 U/L      ALT 15 U/L      Alkaline Phosphatase 66 U/L      Total Protein 6.8 g/dL      Albumin 3.2 g/dL      Total Bilirubin 0.38 mg/dL      eGFR 83 ml/min/1.73sq m     Narrative:      [facility] guidelines for Chronic Kidney Disease (CKD):     Stage 1 with normal or high GFR (GFR > 90 mL/min/1.73 square meters)    Stage 2 Mild CKD (GFR = 60-89 mL/min/1.73 square meters)    Stage 3A Moderate CKD (GFR = 45-59 mL/min/1.73 square meters)    Stage 3B Moderate CKD (GFR = 30-44 mL/min/1.73 square meters)    Stage 4 Severe CKD (GFR = 15-29 mL/min/1.73 square meters)    Stage 5 End Stage CKD (GFR <15 mL/min/1.73 square meters)  Note: GFR calculation is accurate only with a steady state creatinine    CBC and differential [[id number]]  (Abnormal) Collected: 10/17/23 1138    Lab Status: Final result Specimen: Blood from Arm, Left Updated: 10/17/23 1145     WBC 16.98 Thousand/uL      RBC 3.34 Million/uL      Hemoglobin 9.5 g/dL      Hematocrit 30.9 %      MCV 93 fL      MCH 28.4 pg      MCHC 30.7 g/dL      RDW 16.4 %      MPV 9.9 fL      Platelets 051 Thousands/uL      nRBC 0 /100 WBCs      Neutrophils Relative 82 %      Immat GRANS % 1 %      Lymphocytes Relative 12 %      Monocytes Relative 5 %      Eosinophils Relative 0 %      Basophils Relative 0 %      Neutrophils Absolute 13.89 Thousands/µL      Immature Grans Absolute 0.15 Thousand/uL      Lymphocytes Absolute 2.06 Thousands/µL      Monocytes Absolute 0.81 Thousand/µL      Eosinophils Absolute 0.02 Thousand/µL      Basophils Absolute 0.05 Thousands/µL                    No orders to display              Procedures  ECG 12 Lead Documentation Only    Date/Time: 10/17/2023 11:55 AM    Performed by: Trang Mariee DO  Authorized by: Trang Mariee DO    Indications / Diagnosis:  Syncope  ECG reviewed by me, the ED Provider: yes    Patient location:  ED  Previous ECG:     Previous ECG:  Compared to current    Comparison ECG info:  1 month ago    Similarity:  Changes noted  Interpretation:     Interpretation: abnormal    Rate:     ECG rate:  29    ECG rate assessment: bradycardic    Rhythm:     Rhythm: A-V block    QRS:     QRS axis:  Normal    QRS intervals: Wide  Conduction:     Conduction: abnormal      Abnormal conduction: 3rd degree    ST segments:     ST segments:  Normal  T waves:     T waves: normal    Comments: This EKG was interpreted by me. ED Course     conversation with patient, wife, hospice consults, case management - wife agrees that patient wants hospice care. Medical Decision Making  Differential includes symptomatic heart block/ bradycardia, could be complete heart block. Other differential idioventricular rhythm, sepsis, failure to thrive, electrolyte abnl, less likely stroke. Extensive conversation with wife, patient would not want any CPR or ventilator or any surgery, he actually doesn't want any medical treatment. She has been trying to care for him but can't do it anymore. Amount and/or Complexity of Data Reviewed  Labs: ordered.              Disposition  Final diagnoses:   Symptomatic bradycardia - complete heart block   Hospice care     Time reflects when diagnosis was documented in both MDM as applicable and the Disposition within this note       Time User Action Codes Description Comment    10/17/2023 12:11 PM James Cristina Add [R00.1] Symptomatic bradycardia     10/17/2023 12:12 PM Ivette Pleva Add [I45.9] Heart block     10/17/2023 12:12 PM Ivette Pleva Remove [I45.9] Heart block     10/17/2023 12:13 PM Ivette Pleva Add [I44.2] Idioventricular rhythm ([address])     10/17/2023 12:14 PM Ivette Pleva Add [Z51.5] Hospice care     10/17/2023  2:58 PM Ivette Pleva Remove [I44.2] Idioventricular rhythm ([address])     10/17/2023  2:58 PM Ivette Pleva Modify [R00.1] Symptomatic bradycardia heart block    10/17/2023  3:01 PM Ivette Pleva Modify [R00.1] Symptomatic bradycardia complete heart block          ED Disposition       ED Disposition   Discharge    Condition   Stable    Date/Time   Tue Oct 17, 2023  1:20 PM    Comment   Cristiano Duty discharge to home/self care. Follow-up Information    None         Discharge Medication List as of 10/17/2023  3:17 PM        STOP taking these medications       aspirin 81 mg chewable tablet Comments:   Reason for Stopping:         atorvastatin (LIPITOR) 20 mg tablet Comments:   Reason for Stopping:         finasteride (PROSCAR) 5 mg tablet Comments:   Reason for Stopping:         losartan (COZAAR) 50 mg tablet Comments:   Reason for Stopping:         metoprolol succinate (TOPROL-XL) 50 mg 24 hr tablet Comments:   Reason for Stopping:         tamsulosin (FLOMAX) 0.4 mg Comments:   Reason for Stopping:               No discharge procedures on file.     PDMP Review         Value Time User    PDMP Reviewed  Yes 9/11/2023 11:24 AM Matthew Rodriguez PA-C            ED Provider  Electronically Signed by             Kurt Brush DO  10/17/23 7915

## 2023-10-17 NOTE — ED NOTES
Hospice liaison reports transport to hospice house will be 1600. Report to [phone].       Eliel Cancer, ANTIONETTE  10/17/23 1997

## 2023-10-17 NOTE — CASE MANAGEMENT
Case Management Progress Note    Patient name nAoop Coffey  Location ED /ED 01 MRN [id number]  : 1929 Date 10/17/2023       LOS (days): 0  Geometric Mean LOS (GMLOS) (days):   Days to GMLOS:        OBJECTIVE:        Current admission status: Emergency  Preferred Pharmacy:   [address] [place], [place] - [address] [place] [address]  Phone: [phone] Fax: [phone]    Primary Care Provider: No primary care provider on file. Primary Insurance: MEDICARE  Secondary Insurance: [employer]    PROGRESS NOTE: Cm consulted for Hospice. [facility] already notified and working on referral. Offical referral sent via aidin. Hospice spoke with Dr. Brodie Mirza and pt is approved for [facility]. Hospice liaison coming ot [facility] ED to meet with pts wife and have consents signed. Hospice liaison will let this cm know when to get transport for pt.  Cm following

## 2023-10-17 NOTE — HOSPICE NOTE
Informed by Nick Sharpe that wife in agreement with [facility]. Updated Dr. Lizzeth Rogers and Ashutosh Cochran RN [facility]. Met with wife. Questions answered. Consents signed. Copy to wife, copy to [facility] and copy emailed to [address] [place] [address] requesting transport and will advise hospice of time. Notified Zoe Johnson RN to call report to [facility] [phone], 30 min prior to transport, keep all lines Ivs etc in place and medicate with comfort meds prior to transport.

## 2023-10-17 NOTE — HOSPICE NOTE
RECEIVED HOSPICE REFERRAL. DR. Parish Robles REVIEWED CASE WITH DR. Sada Ornelas. PT APPROVED FOR GIP LOC AT [facility] BY DR. Sada Ornelas. CHARITY STOCKTON UPDATED VIA TT. WILL MEET WITH WIFE AT [facility] TO DISCUSS HOSPICE AND OBT CONSENTS.

## 2023-10-17 NOTE — PROGRESS NOTES
-- Patient:  -- MRN: [id number]  -- Aidin Request ID: [id number]  -- Level of care reserved: Hospice  -- Partner Reserved: [facility], [facility], [address] [phone]  -- Clinical needs requested:  -- Geography searched: [id number]  -- Start of Service:  -- Request sent: 12:29pm EDT on 10/17/2023 by Hipolito Randhawa  -- Partner reserved: 1:04pm EDT on 10/17/2023 by Hipolito Randhawa  -- Choice list shared: 1:04pm EDT on 10/17/2023 by Hipolito Randhawa

## 2023-10-17 NOTE — CASE MANAGEMENT
Case Management Progress Note    Patient name Anoop Coffey  Location ED /ED 01 MRN [id number]  : 1929 Date 10/17/2023       LOS (days): 0  Geometric Mean LOS (GMLOS) (days):   Days to GMLOS:        OBJECTIVE:        Current admission status: Emergency  Preferred Pharmacy:   [address] [place], [place] - [address] [facility] [address]  Phone: [phone] Fax: [phone]    Primary Care Provider: No primary care provider on file. Primary Insurance: MEDICARE  Secondary Insurance: [facility] NOTE:Cm met with pts wife. Pt going to [facility]. Cm discussed SNF options as well including OOP cost. Pts wife felt [facility] is ultimately the best option for pt. Transport requested and confirmed for 4pm pick. Cm nurse and hospice notified.

## 2023-10-17 NOTE — HOSPICE NOTE
Met with Pt wife Wendy Velazquez at bedside. Reviewed hospice benefits and services per [facility] guidelines. Pt approved for [facility]. Per Beryle Ganser is too far for her to travel. Tiffanie villagomez joined meeting. She informed Wendy Velazquez that [facility] does not cover room and board at [facility] for hospice. Pt wife requesting eval for [facility] hospice packet and my contact information. Dr. Sada Ornelas and Eliezer Jones RN [facility] updated. Tiffanie villagomez will updated hospice.

## 2023-10-26 ENCOUNTER — HOME CARE VISIT (OUTPATIENT)
Dept: HOME HOSPICE | Facility: HOSPICE | Age: 88
End: 2023-10-26
Payer: MEDICARE

## 2023-10-26 NOTE — CASE COMMUNICATION
Doni Sidhu, Bereavement Final IDG 10/25/23 (1MR) Due: 23   : 1929  SOC: 10/17/23  DOD: 10/21/23  Diagnosis: Syncope and Bradycardia   Primary: Wife - Chace Zhong was a 80year old man at the [facility]. Wife of 36 years, Mike Fraser, would visit; distraught and anxious. No children. She reported caring for Lee Simental at home for the past year and a half and difficulty with his quick decline. Kimberly's main support is her sister  who will be having knee surgery. Sister and her brother-in-law live next door to her. Mike Fraser asked not to involve her sister, at this time. Mike Fraser stated, "His life Lee Simental) is my life" and without him there is "no one to remember with." She voiced guilt for not attempting the pacemaker, but knew he could not make it through the surgery. Mike Fraser shared about her mother's death from cancer and many other losses, around the holidays. Mike Fraser said  she could not emotionally stay at the [facility]  as it was too distressing for her. 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 She said she could not complain about the long life they had together. She voiced support from her niece and nephew. Lee Simental was Jew. He was in the  [employer]. Mike Fraser had requested no recognition of this, mentioning he never spoke of his time in the war and feels he saw too much. Ca jamal is assessed at  for bereavement follow-up. TOD: Mike Fraser was at bedside at time of death. She was quiet and did not want to be hugged. "I don't want to break down". She also was not receptive to having any other family notified. She stayed with Lee Simental until the  home came and left as they came in for his body, "I will wait in my car for this". The prayer shawl was offered to her and initially she said she would take it home bu t as she was leaving she declined. She did not cry throughout but she was sad appearing. Deepest sympathy was extended.     Call Assignments:  Fela Martinez to reassess wife Maribel Euceda at  (Due: 11/4/23)
